# Patient Record
Sex: FEMALE | Race: WHITE | NOT HISPANIC OR LATINO | Employment: STUDENT | URBAN - METROPOLITAN AREA
[De-identification: names, ages, dates, MRNs, and addresses within clinical notes are randomized per-mention and may not be internally consistent; named-entity substitution may affect disease eponyms.]

---

## 2017-01-28 ENCOUNTER — OFFICE VISIT (OUTPATIENT)
Dept: URGENT CARE | Facility: CLINIC | Age: 32
End: 2017-01-28
Payer: COMMERCIAL

## 2017-01-28 VITALS
OXYGEN SATURATION: 99 % | RESPIRATION RATE: 18 BRPM | SYSTOLIC BLOOD PRESSURE: 100 MMHG | DIASTOLIC BLOOD PRESSURE: 68 MMHG | HEART RATE: 78 BPM | TEMPERATURE: 98.2 F

## 2017-01-28 DIAGNOSIS — R05.9 COUGH: ICD-10-CM

## 2017-01-28 DIAGNOSIS — J40 BRONCHITIS: ICD-10-CM

## 2017-01-28 PROCEDURE — 99214 OFFICE O/P EST MOD 30 MIN: CPT | Performed by: NURSE PRACTITIONER

## 2017-01-28 RX ORDER — ALBUTEROL SULFATE 90 UG/1
2 AEROSOL, METERED RESPIRATORY (INHALATION) EVERY 6 HOURS PRN
Qty: 8.5 G | Refills: 1 | Status: SHIPPED | OUTPATIENT
Start: 2017-01-28 | End: 2020-06-22 | Stop reason: SDUPTHER

## 2017-01-28 RX ORDER — AZITHROMYCIN 250 MG/1
TABLET, FILM COATED ORAL
Qty: 6 TAB | Refills: 0 | Status: SHIPPED | OUTPATIENT
Start: 2017-01-28 | End: 2018-11-27

## 2017-01-28 ASSESSMENT — ENCOUNTER SYMPTOMS
SORE THROAT: 1
GASTROINTESTINAL NEGATIVE: 1
SPUTUM PRODUCTION: 1
MYALGIAS: 1
COUGH: 1
WHEEZING: 1
CHILLS: 1
SHORTNESS OF BREATH: 1

## 2017-01-28 NOTE — MR AVS SNAPSHOT
Juanita Ramires   2017 10:45 AM   Office Visit   MRN: [id number]    Department:  [facility]   Dept Phone:  [phone]    Description:  Female : 1985   Provider:  Cathey J Hamman, A.P.N.           Reason for Visit     Cough Over a week dry cough , hoarseness and sorethroat      Allergies as of 2017     No Known Allergies      You were diagnosed with     Bronchitis   [122661]       Cough   [786.2.ICD-9-CM]         Vital Signs     Blood Pressure Pulse Temperature Respirations Oxygen Saturation Smoking Status    100/68 mmHg 78 36.8 °C (98.2 °F) 18 99% Never Smoker       Basic Information     Date Of Birth Sex Race Ethnicity Preferred Language    1985 Female White Non- English      Problem List              ICD-10-CM Priority Class Noted - Resolved    Encounter to establish care with new doctor Z71.89   2016 - Present    Encounter for initial prescription of contraceptive pills Z30.011   2016 - Present    Attention deficit hyperactivity disorder F90.9   2016 - Present    Acquired hypothyroidism E03.9   2016 - Present      Health Maintenance        Date Due Completion Dates    PAP SMEAR 2004 ---    IMM INFLUENZA (1) 2016 ---    IMM DTaP/Tdap/Td Vaccine (2 - Td) 2024            Current Immunizations     Tdap Vaccine 2014      Below and/or attached are the medications your provider expects you to take. Review all of your home medications and newly ordered medications with your provider and/or pharmacist. Follow medication instructions as directed by your provider and/or pharmacist. Please keep your medication list with you and share with your provider. Update the information when medications are discontinued, doses are changed, or new medications (including over-the-counter products) are added; and carry medication information at all times in the event of emergency situations     Allergies:  No Known Allergies          Medications   Valid as of: January 28, 2017 - 11:15 AM    Generic Name Brand Name Tablet Size Instructions for use    Albuterol Sulfate (Aero Soln) albuterol 108 (90 BASE) MCG/ACT Inhale 2 Puffs by mouth every 6 hours as needed for Shortness of Breath.        Albuterol Sulfate (Aero Soln) albuterol 108 (90 BASE) MCG/ACT Inhale 2 Puffs by mouth every 6 hours as needed for Shortness of Breath.        Azithromycin (Tab) ZITHROMAX 250 MG Take 2 pills by mouth on day one, take 1 pill by mouth on days 2-5        Desogestrel-Ethinyl Estradiol (Tab) KARIVA 0.15-0.02/0.01 MG (21/5) Take 1 Tab by mouth every day.        Levothyroxine Sodium (Tab) SYNTHROID 25 MCG Take 1 Tab by mouth every day.        Liothyronine Sodium (Tab) CYTOMEL 5 MCG Take 5 mcg by mouth every day.        Lisdexamfetamine Dimesylate (Cap) VYVANSE 30 MG Take 1 Cap by mouth every morning.        Lisdexamfetamine Dimesylate (Cap) VYVANSE 30 MG Take 1 Cap by mouth every morning.        Montelukast Sodium (Tab) SINGULAIR 10 MG Take 1 Tab by mouth every day.        ROPINIRole HCl (Tab) REQUIP 1 MG Take 1 Tab by mouth 3 times a day.        .                 Medicines prescribed today were sent to:     [facility][id number] [place], [place] - [address] [address]    Phone: [phone] Fax: [phone]    Open 24 Hours?: No      Medication refill instructions:       If your prescription bottle indicates you have medication refills left, it is not necessary to call your provider’s office. Please contact your pharmacy and they will refill your medication.    If your prescription bottle indicates you do not have any refills left, you may request refills at any time through one of the following ways: The online [employer] system (except Urgent Care), by calling your provider’s office, or by asking your pharmacy to contact your provider’s office with a refill request. Medication refills are processed only during regular business hours and  may not be available until the next business day. Your provider may request additional information or to have a follow-up visit with you prior to refilling your medication.   *Please Note: Medication refills are assigned a new Rx number when refilled electronically. Your pharmacy may indicate that no refills were authorized even though a new prescription for the same medication is available at the pharmacy. Please request the medicine by name with the pharmacy before contacting your provider for a refill.           CBLPath Access Code: [email or web address]  Expires: 1/29/2017  8:21 AM    CBLPath  A secure, online tool to manage your health information     [employer]’s CBLPath® is a secure, online tool that connects you to your personalized health information from the privacy of your home -- day or night - making it very easy for you to manage your healthcare. Once the activation process is completed, you can even access your medical information using the CBLPath richard, which is available for free in the Apple Richard store or Google Play store.     CBLPath provides the following levels of access (as shown below):   My Chart Features   [facility] Doctor [facility]  Specialists [facility]  Urgent  Care Non-[facility]  Primary Care  Doctor   Email your healthcare team securely and privately 24/7 X X X    Manage appointments: schedule your next appointment; view details of past/upcoming appointments X      Request prescription refills. X      View recent personal medical records, including lab and immunizations X X X X   View health record, including health history, allergies, medications X X X X   Read reports about your outpatient visits, procedures, consult and ER notes X X X X   See your discharge summary, which is a recap of your hospital and/or ER visit that includes your diagnosis, lab results, and care plan. X X       How to register for CBLPath:  1. Go to  [email or web address].  2. Click on the Sign Up Now box,  which takes you to the New Member Sign Up page. You will need to provide the following information:  a. Enter your [employer] Access Code exactly as it appears at the top of this page. (You will not need to use this code after you’ve completed the sign-up process. If you do not sign up before the expiration date, you must request a new code.)   b. Enter your date of birth.   c. Enter your home email address.   d. Click Submit, and follow the next screen’s instructions.  3. Create a [employer] ID. This will be your [employer] login ID and cannot be changed, so think of one that is secure and easy to remember.  4. Create a [employer] password. You can change your password at any time.  5. Enter your Password Reset Question and Answer. This can be used at a later time if you forget your password.   6. Enter your e-mail address. This allows you to receive e-mail notifications when new information is available in [employer].  7. Click Sign Up. You can now view your health information.    For assistance activating your [employer] account, call [phone]

## 2017-01-28 NOTE — PROGRESS NOTES
Subjective:      Juanita Ramires is a 31 y.o. female who presents with Cough    Past Medical History   Diagnosis Date   • Restless leg syndrome    • Hashimoto's disease    • Hyperthyroidism      Social History     Social History   • Marital Status:      Spouse Name: N/A   • Number of Children: N/A   • Years of Education: N/A     Occupational History   • Not on file.     Social History Main Topics   • Smoking status: Never Smoker    • Smokeless tobacco: Not on file   • Alcohol Use: 0.6 oz/week     1 Shots of liquor per week      Comment: 1 x per year   • Drug Use: No   • Sexual Activity: Yes     Other Topics Concern   • Not on file     Social History Narrative     No family history on file.          Cough  This is a new problem. The current episode started 1 to 4 weeks ago. The problem has been gradually worsening. The problem occurs every few minutes. The cough is productive of sputum. Associated symptoms include chills, ear congestion, ear pain, myalgias, nasal congestion, postnasal drip, a sore throat, shortness of breath and wheezing. Nothing aggravates the symptoms. She has tried OTC cough suppressant for the symptoms. The treatment provided mild relief.       Review of Systems   Constitutional: Positive for chills and malaise/fatigue.   HENT: Positive for congestion, ear pain, postnasal drip and sore throat.    Respiratory: Positive for cough, sputum production, shortness of breath and wheezing.    Gastrointestinal: Negative.    Genitourinary: Negative.    Musculoskeletal: Positive for myalgias.   Skin: Negative.        All other systems reviewed and are negative      Objective:     /68 mmHg  Pulse 78  Temp(Src) 36.8 °C (98.2 °F)  Resp 18  SpO2 99%     Physical Exam   Constitutional: She is oriented to person, place, and time. She appears well-developed and well-nourished.   HENT:   Right Ear: External ear normal.   Left Ear: External ear normal.   Mouth/Throat: No oropharyngeal exudate.   Eyes:  EOM are normal. Pupils are equal, round, and reactive to light.   Neck: Normal range of motion. Neck supple.   Cardiovascular: Normal rate and regular rhythm.    Pulmonary/Chest: She has wheezes.   Breath sounds harsh   Scattered    Musculoskeletal: Normal range of motion.   Neurological: She is alert and oriented to person, place, and time.   Skin: Skin is warm and dry.   Psychiatric: She has a normal mood and affect. Her behavior is normal.   Vitals reviewed.              Assessment/Plan:   Bronchitis  Cough   -zithromax   -albuterol   -declined RX for cough   -follow up if symtpoms persist or worsen   There are no diagnoses linked to this encounter.

## 2018-11-27 ENCOUNTER — HOSPITAL ENCOUNTER (OUTPATIENT)
Facility: MEDICAL CENTER | Age: 33
End: 2018-11-27
Attending: FAMILY MEDICINE
Payer: COMMERCIAL

## 2018-11-27 ENCOUNTER — OFFICE VISIT (OUTPATIENT)
Dept: URGENT CARE | Facility: CLINIC | Age: 33
End: 2018-11-27
Payer: COMMERCIAL

## 2018-11-27 VITALS
TEMPERATURE: 98.9 F | RESPIRATION RATE: 16 BRPM | DIASTOLIC BLOOD PRESSURE: 78 MMHG | WEIGHT: 160 LBS | BODY MASS INDEX: 22.9 KG/M2 | HEIGHT: 70 IN | SYSTOLIC BLOOD PRESSURE: 108 MMHG | HEART RATE: 60 BPM | OXYGEN SATURATION: 97 %

## 2018-11-27 DIAGNOSIS — R30.0 DYSURIA: ICD-10-CM

## 2018-11-27 LAB
APPEARANCE UR: CLEAR
BILIRUB UR STRIP-MCNC: ABNORMAL MG/DL
COLOR UR AUTO: ABNORMAL
GLUCOSE UR STRIP.AUTO-MCNC: ABNORMAL MG/DL
KETONES UR STRIP.AUTO-MCNC: ABNORMAL MG/DL
LEUKOCYTE ESTERASE UR QL STRIP.AUTO: ABNORMAL
NITRITE UR QL STRIP.AUTO: ABNORMAL
PH UR STRIP.AUTO: 7.5 [PH] (ref 5–8)
PROT UR QL STRIP: 30 MG/DL
RBC UR QL AUTO: ABNORMAL
SP GR UR STRIP.AUTO: 1.02
UROBILINOGEN UR STRIP-MCNC: 0.2 MG/DL

## 2018-11-27 PROCEDURE — 81002 URINALYSIS NONAUTO W/O SCOPE: CPT | Performed by: FAMILY MEDICINE

## 2018-11-27 PROCEDURE — 99000 SPECIMEN HANDLING OFFICE-LAB: CPT | Performed by: FAMILY MEDICINE

## 2018-11-27 PROCEDURE — 87086 URINE CULTURE/COLONY COUNT: CPT

## 2018-11-27 PROCEDURE — 87186 SC STD MICRODIL/AGAR DIL: CPT

## 2018-11-27 PROCEDURE — 87077 CULTURE AEROBIC IDENTIFY: CPT

## 2018-11-27 PROCEDURE — 99214 OFFICE O/P EST MOD 30 MIN: CPT | Performed by: FAMILY MEDICINE

## 2018-11-27 RX ORDER — PHENAZOPYRIDINE HYDROCHLORIDE 200 MG/1
200 TABLET, FILM COATED ORAL 3 TIMES DAILY
Qty: 6 TAB | Refills: 0 | Status: SHIPPED | OUTPATIENT
Start: 2018-11-27 | End: 2018-11-29

## 2018-11-27 RX ORDER — NITROFURANTOIN 25; 75 MG/1; MG/1
100 CAPSULE ORAL EVERY 12 HOURS
Qty: 10 CAP | Refills: 0 | Status: SHIPPED | OUTPATIENT
Start: 2018-11-27 | End: 2018-11-30

## 2018-11-27 ASSESSMENT — ENCOUNTER SYMPTOMS
DIZZINESS: 0
VOMITING: 0
NAUSEA: 0
CHILLS: 0
FOCAL WEAKNESS: 0
FEVER: 0

## 2018-11-28 NOTE — PROGRESS NOTES
Subjective:      Juanita Fong is a 33 y.o. female who presents with Dysuria      - This is a very pleasant, well and non-toxic appearing 33 y.o. female with complaints of urinary freq/burn and blood in urine x 3 days. No NVFC          ALLERGIES:  Patient has no known allergies.     PMH:  Past Medical History:   Diagnosis Date   • Hashimoto's disease    • Hyperthyroidism    • Restless leg syndrome         MEDS:    Current Outpatient Prescriptions:   •  nitrofurantoin monohydr macro (MACROBID) 100 MG Cap, Take 1 Cap by mouth every 12 hours for 5 days., Disp: 10 Cap, Rfl: 0  •  phenazopyridine (PYRIDIUM) 200 MG Tab, Take 1 Tab by mouth 3 times a day for 2 days., Disp: 6 Tab, Rfl: 0  •  albuterol 108 (90 BASE) MCG/ACT Aero Soln inhalation aerosol, Inhale 2 Puffs by mouth every 6 hours as needed for Shortness of Breath., Disp: 8.5 g, Rfl: 1  •  levothyroxine (SYNTHROID) 25 MCG Tab, Take 1 Tab by mouth every day., Disp: 90 Tab, Rfl: 3  •  lisdexamfetamine (VYVANSE) 30 MG capsule, Take 1 Cap by mouth every morning., Disp: 30 Cap, Rfl: 0  •  ropinirole (REQUIP) 1 MG Tab, Take 1 Tab by mouth 3 times a day., Disp: 90 Tab, Rfl: 3  •  liothyronine (CYTOMEL) 5 MCG Tab, Take 5 mcg by mouth every day., Disp: , Rfl:   •  desogestrel-ethinyl estradiol (KARIVA) 0.15-0.02/0.01 MG (21/5) per tablet, Take 1 Tab by mouth every day., Disp: 28 Tab, Rfl: 11  •  montelukast (SINGULAIR) 10 MG Tab, Take 1 Tab by mouth every day., Disp: 30 Tab, Rfl: 2    ** I have documented what I find to be significant in regards to past medical, social, family and surgical history  in my HPI or under PMH/PSH/FH review section, otherwise it is contributory **           HPI    Review of Systems   Constitutional: Negative for chills and fever.   Gastrointestinal: Negative for nausea and vomiting.   Genitourinary: Positive for dysuria, frequency and hematuria.   Neurological: Negative for dizziness and focal weakness.   All other systems reviewed and are  "negative.         Objective:     /78   Pulse 60   Temp 37.2 °C (98.9 °F) (Temporal)   Resp 16   Ht 1.778 m (5' 10\")   Wt 72.6 kg (160 lb)   SpO2 97%   BMI 22.96 kg/m²      Physical Exam   Constitutional: She appears well-developed. No distress.   HENT:   Head: Normocephalic and atraumatic.   Cardiovascular: Regular rhythm.    No murmur heard.  Pulmonary/Chest: Effort normal. No respiratory distress.   Abdominal: Soft. There is no tenderness.   Neurological: She is alert. She exhibits normal muscle tone.   Skin: Skin is warm and dry.   Psychiatric: She has a normal mood and affect. Judgment normal.   Nursing note and vitals reviewed.              Assessment/Plan:         1. Dysuria  POCT Urinalysis    Urine Culture    nitrofurantoin monohydr macro (MACROBID) 100 MG Cap    phenazopyridine (PYRIDIUM) 200 MG Tab             Dx & d/c instructions discussed w/ patient and/or family members.     ER precautions (worsening signs symptoms and when to go to ER) discussed.    Follow up w/ PCP in 2-3 days to make sure symptoms improving and no further intervention/treatment and/or work-up needed was advised, ER if feeling worse or not improving in 2 days.    Possible side effects (i.e. Rash, GI upset/constipation, sedation, elevation of BP or sugars) of any medications given discussed.     Patient left in stable condition            "

## 2018-11-30 ENCOUNTER — TELEPHONE (OUTPATIENT)
Dept: URGENT CARE | Facility: PHYSICIAN GROUP | Age: 33
End: 2018-11-30

## 2018-11-30 LAB
BACTERIA UR CULT: ABNORMAL
BACTERIA UR CULT: ABNORMAL
SIGNIFICANT IND 70042: ABNORMAL
SITE SITE: ABNORMAL
SOURCE SOURCE: ABNORMAL

## 2018-11-30 RX ORDER — FLUCONAZOLE 150 MG/1
150 TABLET ORAL DAILY
Qty: 1 TAB | Refills: 2 | Status: SHIPPED | OUTPATIENT
Start: 2018-11-30 | End: 2019-12-05

## 2018-11-30 RX ORDER — SULFAMETHOXAZOLE AND TRIMETHOPRIM 800; 160 MG/1; MG/1
1 TABLET ORAL EVERY 12 HOURS
Qty: 8 TAB | Refills: 0 | Status: SHIPPED | OUTPATIENT
Start: 2018-11-30 | End: 2018-12-04

## 2019-10-13 ENCOUNTER — HOSPITAL ENCOUNTER (OUTPATIENT)
Facility: MEDICAL CENTER | Age: 34
End: 2019-10-13
Attending: NURSE PRACTITIONER
Payer: COMMERCIAL

## 2019-10-13 ENCOUNTER — OFFICE VISIT (OUTPATIENT)
Dept: URGENT CARE | Facility: CLINIC | Age: 34
End: 2019-10-13
Payer: COMMERCIAL

## 2019-10-13 VITALS
RESPIRATION RATE: 16 BRPM | OXYGEN SATURATION: 99 % | HEART RATE: 58 BPM | HEIGHT: 70 IN | WEIGHT: 152 LBS | TEMPERATURE: 99 F | BODY MASS INDEX: 21.76 KG/M2 | DIASTOLIC BLOOD PRESSURE: 60 MMHG | SYSTOLIC BLOOD PRESSURE: 92 MMHG

## 2019-10-13 DIAGNOSIS — N30.90 CYSTITIS: ICD-10-CM

## 2019-10-13 LAB
APPEARANCE UR: ABNORMAL
BILIRUB UR STRIP-MCNC: ABNORMAL MG/DL
COLOR UR AUTO: ABNORMAL
GLUCOSE UR STRIP.AUTO-MCNC: ABNORMAL MG/DL
KETONES UR STRIP.AUTO-MCNC: ABNORMAL MG/DL
LEUKOCYTE ESTERASE UR QL STRIP.AUTO: ABNORMAL
NITRITE UR QL STRIP.AUTO: ABNORMAL
PH UR STRIP.AUTO: 8.5 [PH] (ref 5–8)
PROT UR QL STRIP: ABNORMAL MG/DL
RBC UR QL AUTO: ABNORMAL
SP GR UR STRIP.AUTO: 1.01
UROBILINOGEN UR STRIP-MCNC: 0.2 MG/DL

## 2019-10-13 PROCEDURE — 99000 SPECIMEN HANDLING OFFICE-LAB: CPT | Performed by: NURSE PRACTITIONER

## 2019-10-13 PROCEDURE — 99214 OFFICE O/P EST MOD 30 MIN: CPT | Performed by: NURSE PRACTITIONER

## 2019-10-13 PROCEDURE — 81002 URINALYSIS NONAUTO W/O SCOPE: CPT | Performed by: NURSE PRACTITIONER

## 2019-10-13 PROCEDURE — 87086 URINE CULTURE/COLONY COUNT: CPT

## 2019-10-13 PROCEDURE — 87077 CULTURE AEROBIC IDENTIFY: CPT

## 2019-10-13 PROCEDURE — 87186 SC STD MICRODIL/AGAR DIL: CPT

## 2019-10-13 RX ORDER — NITROFURANTOIN 25; 75 MG/1; MG/1
100 CAPSULE ORAL 2 TIMES DAILY
Qty: 10 CAP | Refills: 0 | Status: SHIPPED | OUTPATIENT
Start: 2019-10-13 | End: 2019-10-18

## 2019-10-13 RX ORDER — PHENAZOPYRIDINE HYDROCHLORIDE 200 MG/1
200 TABLET, FILM COATED ORAL 3 TIMES DAILY PRN
Qty: 10 TAB | Refills: 0 | Status: SHIPPED | OUTPATIENT
Start: 2019-10-13 | End: 2019-12-05

## 2019-10-13 RX ORDER — FLUCONAZOLE 150 MG/1
150 TABLET ORAL DAILY
Qty: 1 TAB | Refills: 0 | Status: SHIPPED | OUTPATIENT
Start: 2019-10-13 | End: 2019-10-14

## 2019-10-13 ASSESSMENT — ENCOUNTER SYMPTOMS
FLANK PAIN: 0
FEVER: 0
CHILLS: 0

## 2019-10-13 NOTE — PROGRESS NOTES
Subjective:      Juanita Fong is a 34 y.o. female who presents with Dysuria    Past Medical History:   Diagnosis Date   • Hashimoto's disease    • Hyperthyroidism    • Restless leg syndrome        Social History     Socioeconomic History   • Marital status:      Spouse name: Not on file   • Number of children: Not on file   • Years of education: Not on file   • Highest education level: Not on file   Occupational History   • Not on file   Social Needs   • Financial resource strain: Not on file   • Food insecurity:     Worry: Not on file     Inability: Not on file   • Transportation needs:     Medical: Not on file     Non-medical: Not on file   Tobacco Use   • Smoking status: Never Smoker   • Smokeless tobacco: Never Used   Substance and Sexual Activity   • Alcohol use: Yes     Alcohol/week: 0.6 oz     Types: 1 Shots of liquor per week     Comment: 1 x per year   • Drug use: No   • Sexual activity: Yes   Lifestyle   • Physical activity:     Days per week: Not on file     Minutes per session: Not on file   • Stress: Not on file   Relationships   • Social connections:     Talks on phone: Not on file     Gets together: Not on file     Attends Zoroastrianism service: Not on file     Active member of club or organization: Not on file     Attends meetings of clubs or organizations: Not on file     Relationship status: Not on file   • Intimate partner violence:     Fear of current or ex partner: Not on file     Emotionally abused: Not on file     Physically abused: Not on file     Forced sexual activity: Not on file   Other Topics Concern   • Not on file   Social History Narrative   • Not on file     History reviewed. No pertinent family history.    Allergies: Patient has no known allergies.    Patient is a 34-year-old female who presents today with complaint of dysuria, urgency, and frequency.  Symptoms started yesterday.  States today she started to note blood in her urine.        Dysuria    This is a new problem. The  "current episode started in the past 7 days. The problem occurs every urination. The problem has been unchanged. The quality of the pain is described as aching and burning. Associated symptoms include frequency, hematuria and urgency. Pertinent negatives include no chills or flank pain. She has tried nothing for the symptoms. The treatment provided no relief.       Review of Systems   Constitutional: Positive for malaise/fatigue. Negative for chills and fever.   Genitourinary: Positive for dysuria, frequency, hematuria and urgency. Negative for flank pain.   Skin: Negative.    All other systems reviewed and are negative.         Objective:     BP (!) 92/60   Pulse (!) 58   Temp 37.2 °C (99 °F) (Oral)   Resp 16   Ht 1.778 m (5' 10\")   Wt 68.9 kg (152 lb)   SpO2 99%   BMI 21.81 kg/m²      Physical Exam   Constitutional: She is oriented to person, place, and time. She appears well-developed and well-nourished.   Cardiovascular: Normal rate, regular rhythm and normal heart sounds.   Pulmonary/Chest: Effort normal and breath sounds normal.   Abdominal: Soft. She exhibits no distension. There is tenderness. There is no rebound and no guarding.   Positive suprapubic tenderness, no CVA tenderness   Musculoskeletal: Normal range of motion.   Neurological: She is alert and oriented to person, place, and time.   Skin: Skin is warm and dry.   Psychiatric: She has a normal mood and affect. Her behavior is normal. Judgment and thought content normal.   Vitals reviewed.              Assessment/Plan:     1. Cystitis    - nitrofurantoin monohyd macro (MACROBID) 100 MG Cap; Take 1 Cap by mouth 2 times a day for 5 days.  Dispense: 10 Cap; Refill: 0  - phenazopyridine (PYRIDIUM) 200 MG Tab; Take 1 Tab by mouth 3 times a day as needed.  Dispense: 10 Tab; Refill: 0  - fluconazole (DIFLUCAN) 150 MG tablet; Take 1 Tab by mouth every day for 1 day.  Dispense: 1 Tab; Refill: 0  - URINE CULTURE(NEW); Future  -push fluids  -ER " precautions for flank pain, fever >101, n/v, flu like symptoms.

## 2019-10-22 ENCOUNTER — OFFICE VISIT (OUTPATIENT)
Dept: URGENT CARE | Facility: CLINIC | Age: 34
End: 2019-10-22
Payer: COMMERCIAL

## 2019-10-22 ENCOUNTER — TELEPHONE (OUTPATIENT)
Dept: SCHEDULING | Facility: IMAGING CENTER | Age: 34
End: 2019-10-22

## 2019-10-22 ENCOUNTER — HOSPITAL ENCOUNTER (OUTPATIENT)
Facility: MEDICAL CENTER | Age: 34
End: 2019-10-22
Attending: PHYSICIAN ASSISTANT
Payer: COMMERCIAL

## 2019-10-22 VITALS
SYSTOLIC BLOOD PRESSURE: 98 MMHG | HEART RATE: 66 BPM | OXYGEN SATURATION: 100 % | WEIGHT: 152 LBS | BODY MASS INDEX: 21.81 KG/M2 | TEMPERATURE: 98.4 F | DIASTOLIC BLOOD PRESSURE: 62 MMHG | RESPIRATION RATE: 16 BRPM

## 2019-10-22 DIAGNOSIS — R30.0 DYSURIA: ICD-10-CM

## 2019-10-22 LAB
APPEARANCE UR: CLEAR
BILIRUB UR STRIP-MCNC: NORMAL MG/DL
CANDIDA DNA VAG QL PROBE+SIG AMP: NEGATIVE
COLOR UR AUTO: YELLOW
G VAGINALIS DNA VAG QL PROBE+SIG AMP: NEGATIVE
GLUCOSE UR STRIP.AUTO-MCNC: NORMAL MG/DL
INT CON NEG: NORMAL
INT CON POS: NORMAL
KETONES UR STRIP.AUTO-MCNC: NORMAL MG/DL
LEUKOCYTE ESTERASE UR QL STRIP.AUTO: NORMAL
NITRITE UR QL STRIP.AUTO: NORMAL
PH UR STRIP.AUTO: 7.5 [PH] (ref 5–8)
POC URINE PREGNANCY TEST: NORMAL
PROT UR QL STRIP: NORMAL MG/DL
RBC UR QL AUTO: NORMAL
SP GR UR STRIP.AUTO: 1.01
T VAGINALIS DNA VAG QL PROBE+SIG AMP: NEGATIVE
UROBILINOGEN UR STRIP-MCNC: 0.2 MG/DL

## 2019-10-22 PROCEDURE — 81025 URINE PREGNANCY TEST: CPT | Performed by: PHYSICIAN ASSISTANT

## 2019-10-22 PROCEDURE — 81002 URINALYSIS NONAUTO W/O SCOPE: CPT | Performed by: PHYSICIAN ASSISTANT

## 2019-10-22 PROCEDURE — 87660 TRICHOMONAS VAGIN DIR PROBE: CPT

## 2019-10-22 PROCEDURE — 87480 CANDIDA DNA DIR PROBE: CPT

## 2019-10-22 PROCEDURE — 87510 GARDNER VAG DNA DIR PROBE: CPT

## 2019-10-22 PROCEDURE — 99213 OFFICE O/P EST LOW 20 MIN: CPT | Performed by: PHYSICIAN ASSISTANT

## 2019-10-22 ASSESSMENT — ENCOUNTER SYMPTOMS
DIZZINESS: 0
SHORTNESS OF BREATH: 0
PALPITATIONS: 0
FEVER: 0
CHILLS: 0
ABDOMINAL PAIN: 1
FLANK PAIN: 0
BLURRED VISION: 0

## 2019-10-22 NOTE — PROGRESS NOTES
Subjective:      Juanita Montero is a 34 y.o. female who presents with Dysuria      HPI:  Patient was seen on 10/13/2019 for similar symptoms. At that time she was found to have a UTI and was placed on Macrobid, which the E. Coli was sensitive to according to the urine culture. She says she took the full course of abx but over the past few days symptoms have been returning again and it feels like it hurts to go to the bathroom. No fever/chills or N/V. She did note some mild white vaginal discharge earlier today.      Review of Systems   Constitutional: Negative for chills, fever and malaise/fatigue.   Eyes: Negative for blurred vision.   Respiratory: Negative for shortness of breath.    Cardiovascular: Negative for chest pain and palpitations.   Gastrointestinal: Positive for abdominal pain.   Genitourinary: Positive for dysuria, frequency and urgency. Negative for flank pain and hematuria.   Neurological: Negative for dizziness.          PMH:  has a past medical history of Hashimoto's disease, Hyperthyroidism, and Restless leg syndrome.  MEDS:   Current Outpatient Medications:   •  phenazopyridine (PYRIDIUM) 200 MG Tab, Take 1 Tab by mouth 3 times a day as needed., Disp: 10 Tab, Rfl: 0  •  fluconazole (DIFLUCAN) 150 MG tablet, Take 1 Tab by mouth every day. (Patient not taking: Reported on 10/13/2019), Disp: 1 Tab, Rfl: 2  •  albuterol 108 (90 BASE) MCG/ACT Aero Soln inhalation aerosol, Inhale 2 Puffs by mouth every 6 hours as needed for Shortness of Breath., Disp: 8.5 g, Rfl: 1  •  levothyroxine (SYNTHROID) 25 MCG Tab, Take 1 Tab by mouth every day., Disp: 90 Tab, Rfl: 3  •  lisdexamfetamine (VYVANSE) 30 MG capsule, Take 1 Cap by mouth every morning., Disp: 30 Cap, Rfl: 0  •  ropinirole (REQUIP) 1 MG Tab, Take 1 Tab by mouth 3 times a day. (Patient not taking: Reported on 10/13/2019), Disp: 90 Tab, Rfl: 3  •  liothyronine (CYTOMEL) 5 MCG Tab, Take 5 mcg by mouth every day., Disp: , Rfl:   •   desogestrel-ethinyl estradiol (KARIVA) 0.15-0.02/0.01 MG (21/5) per tablet, Take 1 Tab by mouth every day. (Patient not taking: Reported on 10/13/2019), Disp: 28 Tab, Rfl: 11  •  montelukast (SINGULAIR) 10 MG Tab, Take 1 Tab by mouth every day. (Patient not taking: Reported on 10/13/2019), Disp: 30 Tab, Rfl: 2  ALLERGIES: No Known Allergies  SURGHX: History reviewed. No pertinent surgical history.  SOCHX:  reports that she has never smoked. She has never used smokeless tobacco. She reports that she drinks about 0.6 oz of alcohol per week. She reports that she does not use drugs.  FH: Family history was reviewed, no pertinent findings to report         Objective:     BP (!) 98/62   Pulse 66   Temp 36.9 °C (98.4 °F) (Temporal)   Resp 16   Wt 68.9 kg (152 lb)   LMP  (LMP Unknown) Comment: IUD  SpO2 100%   BMI 21.81 kg/m²      Physical Exam   Constitutional: She is oriented to person, place, and time. She appears well-developed and well-nourished.   HENT:   Head: Normocephalic and atraumatic.   Right Ear: External ear normal.   Left Ear: External ear normal.   Eyes: Pupils are equal, round, and reactive to light. Conjunctivae are normal.   Cardiovascular: Normal rate and regular rhythm.   No murmur heard.  Pulmonary/Chest: Effort normal and breath sounds normal.   Abdominal: Soft. Normal appearance. There is no tenderness. There is no CVA tenderness.   Neurological: She is alert and oriented to person, place, and time.   Skin: Skin is warm and dry. Capillary refill takes less than 2 seconds.   Psychiatric: She has a normal mood and affect. Her behavior is normal. Judgment normal.         POCT Urinalysis - Negative for infection    POCT Pregnancy - Negative     Assessment/Plan:     1. Dysuria  - VAGINAL PATHOGENS DNA PANEL; Future  - POCT Urinalysis  - POCT Pregnancy  *UA is negative for infection. Will rule out other causes of her symptoms (yeast, BV) and if those are negative we can consider a short course of  Bactrim based on previous culture results          Differential Diagnosis, natural history, and supportive care discussed. Return to the Urgent Care or follow up with your PCP if symptoms fail to resolve, or for any new or worsening symptoms. Emergency room precautions discussed. Patient and/or family appears understanding of information.

## 2019-12-05 ENCOUNTER — OFFICE VISIT (OUTPATIENT)
Dept: MEDICAL GROUP | Facility: LAB | Age: 34
End: 2019-12-05
Payer: COMMERCIAL

## 2019-12-05 VITALS
RESPIRATION RATE: 16 BRPM | BODY MASS INDEX: 22.65 KG/M2 | HEIGHT: 70 IN | DIASTOLIC BLOOD PRESSURE: 58 MMHG | TEMPERATURE: 98.7 F | WEIGHT: 158.2 LBS | HEART RATE: 60 BPM | OXYGEN SATURATION: 95 % | SYSTOLIC BLOOD PRESSURE: 98 MMHG

## 2019-12-05 DIAGNOSIS — F33.9 RECURRENT MAJOR DEPRESSIVE DISORDER, REMISSION STATUS UNSPECIFIED (HCC): ICD-10-CM

## 2019-12-05 DIAGNOSIS — F41.9 ANXIETY: ICD-10-CM

## 2019-12-05 DIAGNOSIS — Z00.00 WELL ADULT EXAM: ICD-10-CM

## 2019-12-05 DIAGNOSIS — E03.9 ACQUIRED HYPOTHYROIDISM: ICD-10-CM

## 2019-12-05 PROCEDURE — 99214 OFFICE O/P EST MOD 30 MIN: CPT | Performed by: NURSE PRACTITIONER

## 2019-12-05 RX ORDER — LEVOTHYROXINE SODIUM 0.03 MG/1
25 TABLET ORAL
COMMUNITY
End: 2020-02-10 | Stop reason: SDUPTHER

## 2019-12-05 RX ORDER — LIOTHYRONINE SODIUM 5 UG/1
5 TABLET ORAL DAILY
Qty: 90 TAB | Refills: 3 | Status: SHIPPED | OUTPATIENT
Start: 2019-12-05 | End: 2020-02-12 | Stop reason: SDUPTHER

## 2019-12-05 RX ORDER — LEVOTHYROXINE SODIUM 0.2 MG/1
200 TABLET ORAL
COMMUNITY
End: 2020-02-12 | Stop reason: SDUPTHER

## 2019-12-05 RX ORDER — SERTRALINE HYDROCHLORIDE 25 MG/1
25 TABLET, FILM COATED ORAL DAILY
Qty: 30 TAB | Refills: 11 | Status: SHIPPED
Start: 2019-12-05 | End: 2020-06-22

## 2019-12-05 ASSESSMENT — PATIENT HEALTH QUESTIONNAIRE - PHQ9: CLINICAL INTERPRETATION OF PHQ2 SCORE: 0

## 2019-12-05 NOTE — PROGRESS NOTES
CC:Diagnoses of Well adult exam, Acquired hypothyroidism, Recurrent major depressive disorder, remission status unspecified (HCC), and Anxiety were pertinent to this visit.    HISTORY OF PRESENT ILLNESS: Juanita Montero is a 34 y.o. female  patient who is here to establish care.  She is a previous patient of [place] and is here today and to discuss the following problems:    Depression  Anxiety  New to me, chronic problem for which patient has been on and off Zoloft since she was a teenager.  She does believe that she mostly suffers from depression but has some anxiety mixed in.  She was recently started on 50 mg of Zoloft however she has been alternating between 25 and 50 and feels better at 25. Current symptoms include none. Patient denies hopelessness, impaired memory, recurrent thoughts of death, suicidal thoughts without plan, suicidal thoughts with specific plan and suicidal attempt. She denies current suicidal and homicidal plan or intent. Family history significant for nothing,. Possible organic causes contributing are: endocrine/metabolic. Risk factors: previous episode of depression Previous treatment includes individual therapy and cognitive therapy. She complains of the following side effects from the treatment: On higher doses of Zoloft she reports that she had increased suicidal thoughts however on lower doses she denies any symptoms.  She does feel her symptoms tend to be worse seasonally and situationally.    Depression Screening    Little interest or pleasure in doing things?  0 - not at all  Feeling down, depressed , or hopeless? 0 - not at all  Patient Health Questionnaire Score: 0    If depressive symptoms identified deferred to follow up visit unless specifically addressed in assesment and plan.      Interpretation of PHQ-9 Total Score   Score Severity   1-4 Minimal Depression   5-9 Mild Depression   10-14 Moderate Depression   15-19 Moderately Severe Depression   20-27 Severe  Depression        Hypothyroidism  New to me, chronic.  Patient has been on 225 mcg Synthroid daily as well as 5 g of Cytomel.  She has been followed by endocrinology in the past however had no records today and we will request those records.  She does state that she has Hashimoto's and has had elevated TPO antibodies.  Reports that she feels well and takes her medication on an empty stomach every morning.  ROS is NEGATIVE for: cold or heat intolerance, anxiety/depression, chest pain/pressure, palpitations, hair thickening/coarsening/falling out/thinning, skin changes, diarrhea/constipation, unexpected weight change.    Health Maintenance: Completed    Patient Active Problem List    Diagnosis Date Noted   • Anxiety 12/05/2019   • Acquired hypothyroidism 08/29/2016   • Encounter for initial prescription of contraceptive pills 07/13/2016   • Attention deficit hyperactivity disorder 07/13/2016        Allergies:Patient has no known allergies.    Current Outpatient Medications   Medication Sig Dispense Refill   • levothyroxine (SYNTHROID) 200 MCG Tab Take 200 mcg by mouth Every morning on an empty stomach.     • levothyroxine (SYNTHROID) 25 MCG Tab Take 25 mcg by mouth Every morning on an empty stomach.     • sertraline (ZOLOFT) 25 MG tablet Take 1 Tab by mouth every day. 30 Tab 11   • liothyronine (CYTOMEL) 5 MCG Tab Take 1 Tab by mouth every day. 90 Tab 3   • albuterol 108 (90 BASE) MCG/ACT Aero Soln inhalation aerosol Inhale 2 Puffs by mouth every 6 hours as needed for Shortness of Breath. 8.5 g 1     No current facility-administered medications for this visit.        Social History     Tobacco Use   • Smoking status: Never Smoker   • Smokeless tobacco: Never Used   Substance Use Topics   • Alcohol use: Yes     Alcohol/week: 0.6 oz     Types: 1 Shots of liquor per week     Comment: 1 x per year   • Drug use: No     Social History     Patient does not qualify to have social determinant information on file (likely too  "young).   Social History Narrative   • Not on file       Family History   Problem Relation Age of Onset   • Osteoporosis Mother    • No Known Problems Father        ROS:     Constitutional:  Negative for fever, chills, unexpected weight change, and fatigue/generalized weakness.  HEENT:  Negative for headaches, vision changes, hearing changes, ear pain, ear discharge, rhinorrhea, sinus congestion, sore throat, and neck pain.    Respiratory: Negative for cough, sputum production, chest congestion, dyspnea, wheezing, and crackles.    Cardiovascular:Negative for chest pain, palpitations, orthopnea, and bilateral lower extremity edema.   Gastrointestinal:Negative for heartburn, nausea, vomiting, abdominal pain, hematochezia, melena, diarrhea, constipation, and greasy/foul-smelling stools.   Genitourinary: Negative for dysuria, polyuria, hematuria, pyuria, urinary urgency, and urinary incontinence.   Musculoskeletal:Negative for myalgias, back pain, and joint pain.   Skin: Negative for rash, itching, cyanotic skin color change.   Neurological: Negative for dizziness, tingling, tremors, focal sensory deficit, focal weakness and headaches.   Endo/Heme/Allergies: Does not bruise/bleed easily.   Psychiatric/Behavioral: Negative for depression, suicidal/homicidal ideation and memory loss.        EXAM:   BP (!) 98/58 (BP Location: Left arm, Patient Position: Sitting, BP Cuff Size: Adult)   Pulse 60   Temp 37.1 °C (98.7 °F) (Temporal)   Resp 16   Ht 1.575 m (5' 2\")   Wt 71.8 kg (158 lb 3.2 oz)   SpO2 95%  Body mass index is 28.94 kg/m².    Constitutional: Well-developed and well-nourished. Not diaphoretic. No distress.   Skin: Skin is warm and dry. No rash noted.  Head: Atraumatic without lesions.  Eyes: Conjunctivae and extraocular motions are normal. Pupils are equal, round, and reactive to light. No scleral icterus.   Ears:  External ears unremarkable. Tympanic membranes clear and intact.  Nose: Nares patent. Mucosa " without edema or erythema. No discharge. No facial tenderness.  Mouth/Throat: Tongue normal. Oropharynx is clear and moist. Posterior pharynx without erythema or exudates.  Neck: Supple, trachea midline. No thyromegaly present. No cervical or supraclavicular lymphadenopathy.  Cardiovascular: Regular rate and rhythm.   Chest: Effort normal. Clear to auscultation throughout. No adventitious sounds.   Abdomen: Soft, non tender, and without distention. Active bowel sounds in all four quadrants. No rebound, guarding, masses or hepatosplenomegaly.  Extremities: No cyanosis, clubbing, erythema, nor edema.   Neurological: Alert and oriented x 3. Sensation intact.   Psychiatric:  Behavior, mood, and affect are appropriate.       ASSESSMENT/PLAN:    1. Well adult exam  Due for Pap. Had positive HPV on last one year ago.   - CBC WITH DIFFERENTIAL; Future  - Comp Metabolic Panel; Future  - VITAMIN D,25 HYDROXY; Future  - Lipid Profile; Future    2. Acquired hypothyroidism  New to me, chronic. Stable, well-controlled, taking medication as directed.     - TSH; Future  - FREE THYROXINE; Future  - liothyronine (CYTOMEL) 5 MCG Tab; Take 1 Tab by mouth every day.  Dispense: 90 Tab; Refill: 3    3. Recurrent major depressive disorder, remission status unspecified (HCC)  4. Anxiety  New to me, chronic patient is feeling well on Zoloft 25 mg.  Will continue. Denies any suicidal or homicidal ideation. Emphasized importance of healthy diet and exercise. Discussed that should the patient have any symptoms they should call suicide prevention hotline or report to the emergency room immediately.  - sertraline (ZOLOFT) 25 MG tablet; Take 1 Tab by mouth every day.  Dispense: 30 Tab; Refill: 11        Return in about 4 weeks (around 1/2/2020) for Pap.       Please note that this dictation was created using voice recognition software. I have made every reasonable attempt to correct obvious errors, but I expect that there are errors of grammar and  possibly content that I did not discover before finalizing the note.

## 2020-01-01 ENCOUNTER — HOSPITAL ENCOUNTER (OUTPATIENT)
Facility: MEDICAL CENTER | Age: 35
End: 2020-01-01
Attending: NURSE PRACTITIONER
Payer: COMMERCIAL

## 2020-01-01 ENCOUNTER — OFFICE VISIT (OUTPATIENT)
Dept: URGENT CARE | Facility: MEDICAL CENTER | Age: 35
End: 2020-01-01
Payer: COMMERCIAL

## 2020-01-01 VITALS
BODY MASS INDEX: 23.24 KG/M2 | OXYGEN SATURATION: 98 % | TEMPERATURE: 98.8 F | WEIGHT: 162 LBS | DIASTOLIC BLOOD PRESSURE: 68 MMHG | HEART RATE: 67 BPM | RESPIRATION RATE: 20 BRPM | SYSTOLIC BLOOD PRESSURE: 116 MMHG

## 2020-01-01 DIAGNOSIS — N76.0 ACUTE VAGINITIS: ICD-10-CM

## 2020-01-01 LAB
CANDIDA DNA VAG QL PROBE+SIG AMP: NEGATIVE
G VAGINALIS DNA VAG QL PROBE+SIG AMP: NEGATIVE
T VAGINALIS DNA VAG QL PROBE+SIG AMP: NEGATIVE

## 2020-01-01 PROCEDURE — 87510 GARDNER VAG DNA DIR PROBE: CPT

## 2020-01-01 PROCEDURE — 87480 CANDIDA DNA DIR PROBE: CPT

## 2020-01-01 PROCEDURE — 87591 N.GONORRHOEAE DNA AMP PROB: CPT

## 2020-01-01 PROCEDURE — 99214 OFFICE O/P EST MOD 30 MIN: CPT | Performed by: NURSE PRACTITIONER

## 2020-01-01 PROCEDURE — 87491 CHLMYD TRACH DNA AMP PROBE: CPT

## 2020-01-01 PROCEDURE — 87660 TRICHOMONAS VAGIN DIR PROBE: CPT

## 2020-01-01 ASSESSMENT — ENCOUNTER SYMPTOMS
FEVER: 0
VAGINITIS: 1
CHILLS: 0
GASTROINTESTINAL NEGATIVE: 1
RESPIRATORY NEGATIVE: 1
VOMITING: 0
ABDOMINAL PAIN: 0
NAUSEA: 0
CONSTITUTIONAL NEGATIVE: 1

## 2020-01-01 NOTE — PROGRESS NOTES
Subjective:     Juanita Montero is a 34 y.o. female who presents for UTI (bacterial infection x2 week)       Vaginitis   This is a new problem. Episode onset: 2 weeks ago. The problem has been waxing and waning. Pertinent negatives include no abdominal pain, chills, dysuria, fever, frequency, nausea, urgency or vomiting.     Patient reports vaginal odor and discharge.  Reports history of yeast infections and took a Diflucan while she was traveling.  Symptoms not improve so she went to a pharmacy and had an OTC swab which came back positive for bacterial vaginosis.  She reports that the pharmacy been gave her 5 metronidazole tablets which she was told to take all at once.  Symptoms did not go away.  Denies urinary symptoms.  Currently on her period.    PMH:  has a past medical history of Hashimoto's disease, Hyperthyroidism, and Restless leg syndrome.    MEDS:   Current Outpatient Medications:   •  levothyroxine (SYNTHROID) 200 MCG Tab, Take 200 mcg by mouth Every morning on an empty stomach., Disp: , Rfl:   •  levothyroxine (SYNTHROID) 25 MCG Tab, Take 25 mcg by mouth Every morning on an empty stomach., Disp: , Rfl:   •  sertraline (ZOLOFT) 25 MG tablet, Take 1 Tab by mouth every day., Disp: 30 Tab, Rfl: 11  •  liothyronine (CYTOMEL) 5 MCG Tab, Take 1 Tab by mouth every day., Disp: 90 Tab, Rfl: 3  •  albuterol 108 (90 BASE) MCG/ACT Aero Soln inhalation aerosol, Inhale 2 Puffs by mouth every 6 hours as needed for Shortness of Breath., Disp: 8.5 g, Rfl: 1    ALLERGIES:   Allergies   Allergen Reactions   • Eggs Anaphylaxis   • Shellfish Allergy Anaphylaxis   • Wheat Bran Anaphylaxis     SURGHX: History reviewed. No pertinent surgical history.    SOCHX:  reports that she has never smoked. She has never used smokeless tobacco. She reports current alcohol use of about 0.6 oz of alcohol per week. She reports that she does not use drugs.     FH: Reviewed with patient, not pertinent to this visit.    Review of  Systems   Constitutional: Negative.  Negative for chills, fever and malaise/fatigue.   Respiratory: Negative.    Gastrointestinal: Negative.  Negative for abdominal pain, nausea and vomiting.   Genitourinary: Negative for dysuria, frequency and urgency.        Vaginal odor, discharge   All other systems reviewed and are negative.    Objective:     /68   Pulse 67   Temp 37.1 °C (98.8 °F) (Temporal)   Resp 20   Wt 73.5 kg (162 lb)   LMP 12/02/2019 (Approximate)   SpO2 98%   BMI 23.24 kg/m²     Physical Exam  Vitals signs reviewed.   Constitutional:       General: She is not in acute distress.     Appearance: She is well-developed. She is not toxic-appearing or diaphoretic.   HENT:      Head: Normocephalic.      Right Ear: External ear normal.      Left Ear: External ear normal.      Nose: Nose normal.   Eyes:      Extraocular Movements: Extraocular movements intact.      Conjunctiva/sclera: Conjunctivae normal.   Neck:      Musculoskeletal: Normal range of motion.   Cardiovascular:      Rate and Rhythm: Normal rate.   Pulmonary:      Effort: Pulmonary effort is normal. No respiratory distress.   Abdominal:      General: There is no distension.      Palpations: Abdomen is soft.      Tenderness: There is no tenderness.   Musculoskeletal: Normal range of motion.         General: No deformity.   Skin:     General: Skin is warm and dry.      Coloration: Skin is not pale.   Neurological:      Mental Status: She is alert and oriented to person, place, and time.      Sensory: No sensory deficit.      Coordination: Coordination normal.   Psychiatric:         Behavior: Behavior normal. Behavior is cooperative.          Assessment/Plan:     1. Acute vaginitis  - VAGINAL PATHOGENS DNA PANEL; Future  - Chlamydia/GC PCR Urine Or Swab; Future    Vaginal pathogens and chlamydia/gonorrhea self swabs pending.    Discussed close monitoring and supportive measures including increasing fluids and rest as well as OTC symptom  management per 's instructions.    Vital signs stable, afebrile, asymptomatic, no acute distress.    Warning signs reviewed. Strict return/ER precautions advised.    Patient advised to: Return for 1) Symptoms don't improve or worsen, or go to ER, 2) Follow up with primary care in 7-10 days.    Differential diagnosis, natural history, supportive care, and indications for immediate follow-up discussed. All questions answered. Patient agrees with the plan of care.

## 2020-01-02 LAB
C TRACH DNA SPEC QL NAA+PROBE: NEGATIVE
N GONORRHOEA DNA SPEC QL NAA+PROBE: NEGATIVE
SPECIMEN SOURCE: NORMAL

## 2020-01-04 ENCOUNTER — PATIENT MESSAGE (OUTPATIENT)
Dept: URGENT CARE | Facility: PHYSICIAN GROUP | Age: 35
End: 2020-01-04

## 2020-01-04 DIAGNOSIS — Z86.19 HISTORY OF CANDIDAL VULVOVAGINITIS: ICD-10-CM

## 2020-01-04 DIAGNOSIS — N76.0 ACUTE VAGINITIS: ICD-10-CM

## 2020-01-04 RX ORDER — METRONIDAZOLE 500 MG/1
500 TABLET ORAL 2 TIMES DAILY
Qty: 14 TAB | Refills: 0 | Status: SHIPPED
Start: 2020-01-04 | End: 2020-01-09

## 2020-01-04 RX ORDER — FLUCONAZOLE 150 MG/1
150 TABLET ORAL
Qty: 2 TAB | Refills: 0 | Status: SHIPPED | OUTPATIENT
Start: 2020-01-04 | End: 2020-01-24 | Stop reason: SDUPTHER

## 2020-01-09 ENCOUNTER — OFFICE VISIT (OUTPATIENT)
Dept: MEDICAL GROUP | Facility: LAB | Age: 35
End: 2020-01-09
Payer: COMMERCIAL

## 2020-01-09 VITALS
HEIGHT: 70 IN | HEART RATE: 60 BPM | SYSTOLIC BLOOD PRESSURE: 100 MMHG | OXYGEN SATURATION: 96 % | TEMPERATURE: 98.8 F | BODY MASS INDEX: 23.19 KG/M2 | DIASTOLIC BLOOD PRESSURE: 64 MMHG | WEIGHT: 162 LBS

## 2020-01-09 DIAGNOSIS — N89.8 VAGINAL ODOR: ICD-10-CM

## 2020-01-09 DIAGNOSIS — T19.2XXA RETAINED TAMPON, INITIAL ENCOUNTER: ICD-10-CM
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
## 2020-01-09 PROCEDURE — 99214 OFFICE O/P EST MOD 30 MIN: CPT | Performed by: NURSE PRACTITIONER

## 2020-01-09 RX ORDER — CLINDAMYCIN HYDROCHLORIDE 300 MG/1
300 CAPSULE ORAL 3 TIMES DAILY
Qty: 21 CAP | Refills: 0 | Status: SHIPPED | OUTPATIENT
Start: 2020-01-09 | End: 2020-01-16

## 2020-01-09 RX ORDER — AMPICILLIN 500 MG/1
500 CAPSULE ORAL 4 TIMES DAILY
Qty: 40 CAP | Refills: 0 | Status: SHIPPED
Start: 2020-01-09 | End: 2020-06-22

## 2020-01-09 ASSESSMENT — PATIENT HEALTH QUESTIONNAIRE - PHQ9: CLINICAL INTERPRETATION OF PHQ2 SCORE: 0

## 2020-01-09 NOTE — PROGRESS NOTES
"CC:Diagnoses of Retained tampon, initial encounter and Vaginal odor were pertinent to this visit.    HISTORY OF PRESENT ILLNESS: Juanita Montero is a 34 y.o. female established patient who presents today to discuss the following problems:    Vaginal odor  This is a new problem.  Patient was seen in urgent care on 1/1/2019 where she self swabbed and did a full STD panel for new onset of vaginal odor and suspected BV versus candidiasis.  The swab did come back negative and her STD panel was negative but patient was started on Flagyl due to clinical symptoms.  She presented here today for her preventative Pap exam and upon insertion of the speculum it was noted that she had a retained tampon in place.  Patient believes this tampon has been in there for over 6 weeks.  She does report that she has not been feeling well but thought this was related to the Flagyl she was started on.  She had had a long trip to [place] recently over the holidays which is when symptoms originally started.  She did have very \"fishy\" smell originally but then she stated that the smell turned more towards an odor of \"bad breath\".  She stated that her  even noticed it.  It had not occurred to her that she may have forgotten to remove a tampon.      Health Maintenance: Completed    Patient Active Problem List    Diagnosis Date Noted   • Anxiety 12/05/2019   • Acquired hypothyroidism 08/29/2016   • Encounter for initial prescription of contraceptive pills 07/13/2016   • Attention deficit hyperactivity disorder 07/13/2016        Allergies:Eggs; Shellfish allergy; and Wheat bran    Current Outpatient Medications   Medication Sig Dispense Refill   • ampicillin (PRINCIPEN) 500 MG Cap Take 1 Cap by mouth 4 times a day. 40 Cap 0   • clindamycin (CLEOCIN) 300 MG Cap Take 1 Cap by mouth 3 times a day for 7 days. 21 Cap 0   • fluconazole (DIFLUCAN) 150 MG tablet Take 1 Tab by mouth every 72 hours. 2 Tab 0   • levothyroxine (SYNTHROID) 200 " "MCG Tab Take 200 mcg by mouth Every morning on an empty stomach.     • levothyroxine (SYNTHROID) 25 MCG Tab Take 25 mcg by mouth Every morning on an empty stomach.     • sertraline (ZOLOFT) 25 MG tablet Take 1 Tab by mouth every day. 30 Tab 11   • liothyronine (CYTOMEL) 5 MCG Tab Take 1 Tab by mouth every day. 90 Tab 3   • albuterol 108 (90 BASE) MCG/ACT Aero Soln inhalation aerosol Inhale 2 Puffs by mouth every 6 hours as needed for Shortness of Breath. 8.5 g 1     No current facility-administered medications for this visit.        Social History     Tobacco Use   • Smoking status: Never Smoker   • Smokeless tobacco: Never Used   Substance Use Topics   • Alcohol use: Yes     Alcohol/week: 0.6 oz     Types: 1 Shots of liquor per week     Comment: 1 x per year   • Drug use: No     Social History     Patient does not qualify to have social determinant information on file (likely too young).   Social History Narrative   • Not on file       Family History   Problem Relation Age of Onset   • Osteoporosis Mother    • No Known Problems Father        ROS:     No fevers or weight loss  No headaches or sore throat  No chest pain or shortness of breath  No bowel changes  No lower extremity edema  No suicidal ideation or panic attack        EXAM:   /64 (BP Location: Left arm, Patient Position: Sitting, BP Cuff Size: Adult)   Pulse 60   Temp 37.1 °C (98.8 °F) (Temporal)   Ht 1.778 m (5' 10\")   Wt 73.5 kg (162 lb)   SpO2 96%  Body mass index is 23.24 kg/m².    Constitutional: Well-developed and well-nourished. Not diaphoretic. No distress.   Skin: Skin is warm and dry. No rash noted.  Head: Atraumatic without lesions.  Neck: Supple, trachea midline. No thyromegaly present. No cervical or supraclavicular lymphadenopathy.  Cardiovascular: Regular rate and rhythm.   Chest: Effort normal. Clear to auscultation throughout. No adventitious sounds.   Abdomen: Soft, non tender, and without distention. Active bowel sounds in " all four quadrants. No rebound, guarding, masses or hepatosplenomegaly.  Pelvic Exam -  Normal external genitalia with no lesions.  Retained tampon in place.  IUD strings visualized.  Tampon removed with ring forceps.    Neurological: Alert and oriented x 3. Sensation intact.   Psychiatric:  Behavior, mood, and affect are appropriate.       ASSESSMENT/PLAN:    1. Retained tampon, initial encounter  2. Vaginal odor  New problem.  Tampon removed with ring forceps without problem.  Start on empiric abx.  Return for pap in 4 weeks.   - ampicillin (PRINCIPEN) 500 MG Cap; Take 1 Cap by mouth 4 times a day.  Dispense: 40 Cap; Refill: 0  - clindamycin (CLEOCIN) 300 MG Cap; Take 1 Cap by mouth 3 times a day for 7 days.  Dispense: 21 Cap; Refill: 0    Return in about 4 weeks (around 2/6/2020) for Pap.       Please note that this dictation was created using voice recognition software. I have made every reasonable attempt to correct obvious errors, but I expect that there are errors of grammar and possibly content that I did not discover before finalizing the note.

## 2020-01-24 DIAGNOSIS — Z86.19 HISTORY OF CANDIDAL VULVOVAGINITIS: ICD-10-CM

## 2020-01-24 RX ORDER — FLUCONAZOLE 150 MG/1
150 TABLET ORAL
Qty: 2 TAB | Refills: 0 | Status: SHIPPED | OUTPATIENT
Start: 2020-01-24 | End: 2020-03-21

## 2020-02-07 ENCOUNTER — HOSPITAL ENCOUNTER (OUTPATIENT)
Dept: LAB | Facility: MEDICAL CENTER | Age: 35
End: 2020-02-07
Attending: ALLERGY & IMMUNOLOGY
Payer: COMMERCIAL

## 2020-02-07 ENCOUNTER — HOSPITAL ENCOUNTER (OUTPATIENT)
Dept: LAB | Facility: MEDICAL CENTER | Age: 35
End: 2020-02-07
Attending: NURSE PRACTITIONER
Payer: COMMERCIAL

## 2020-02-07 DIAGNOSIS — E03.9 ACQUIRED HYPOTHYROIDISM: ICD-10-CM

## 2020-02-07 DIAGNOSIS — Z00.00 WELL ADULT EXAM: ICD-10-CM

## 2020-02-07 LAB
25(OH)D3 SERPL-MCNC: 37 NG/ML (ref 30–100)
ALBUMIN SERPL BCP-MCNC: 4 G/DL (ref 3.2–4.9)
ALBUMIN/GLOB SERPL: 1.5 G/DL
ALP SERPL-CCNC: 53 U/L (ref 30–99)
ALT SERPL-CCNC: 34 U/L (ref 2–50)
ANION GAP SERPL CALC-SCNC: 5 MMOL/L (ref 0–11.9)
AST SERPL-CCNC: 30 U/L (ref 12–45)
BASOPHILS # BLD AUTO: 0.6 % (ref 0–1.8)
BASOPHILS # BLD: 0.03 K/UL (ref 0–0.12)
BILIRUB SERPL-MCNC: 0.4 MG/DL (ref 0.1–1.5)
BUN SERPL-MCNC: 25 MG/DL (ref 8–22)
CALCIUM SERPL-MCNC: 9.1 MG/DL (ref 8.5–10.5)
CHLORIDE SERPL-SCNC: 104 MMOL/L (ref 96–112)
CHOLEST SERPL-MCNC: 149 MG/DL (ref 100–199)
CO2 SERPL-SCNC: 29 MMOL/L (ref 20–33)
CREAT SERPL-MCNC: 0.84 MG/DL (ref 0.5–1.4)
EOSINOPHIL # BLD AUTO: 0.09 K/UL (ref 0–0.51)
EOSINOPHIL NFR BLD: 1.9 % (ref 0–6.9)
ERYTHROCYTE [DISTWIDTH] IN BLOOD BY AUTOMATED COUNT: 44.1 FL (ref 35.9–50)
FASTING STATUS PATIENT QL REPORTED: NORMAL
GLOBULIN SER CALC-MCNC: 2.7 G/DL (ref 1.9–3.5)
GLUCOSE SERPL-MCNC: 82 MG/DL (ref 65–99)
HCT VFR BLD AUTO: 42.8 % (ref 37–47)
HDLC SERPL-MCNC: 61 MG/DL
HGB BLD-MCNC: 14 G/DL (ref 12–16)
IMM GRANULOCYTES # BLD AUTO: 0.01 K/UL (ref 0–0.11)
IMM GRANULOCYTES NFR BLD AUTO: 0.2 % (ref 0–0.9)
LDLC SERPL CALC-MCNC: 75 MG/DL
LYMPHOCYTES # BLD AUTO: 1.96 K/UL (ref 1–4.8)
LYMPHOCYTES NFR BLD: 40.9 % (ref 22–41)
MCH RBC QN AUTO: 31.4 PG (ref 27–33)
MCHC RBC AUTO-ENTMCNC: 32.7 G/DL (ref 33.6–35)
MCV RBC AUTO: 96 FL (ref 81.4–97.8)
MONOCYTES # BLD AUTO: 0.29 K/UL (ref 0–0.85)
MONOCYTES NFR BLD AUTO: 6.1 % (ref 0–13.4)
NEUTROPHILS # BLD AUTO: 2.41 K/UL (ref 2–7.15)
NEUTROPHILS NFR BLD: 50.3 % (ref 44–72)
NRBC # BLD AUTO: 0 K/UL
NRBC BLD-RTO: 0 /100 WBC
PLATELET # BLD AUTO: 278 K/UL (ref 164–446)
PMV BLD AUTO: 10.7 FL (ref 9–12.9)
POTASSIUM SERPL-SCNC: 4 MMOL/L (ref 3.6–5.5)
PROT SERPL-MCNC: 6.7 G/DL (ref 6–8.2)
RBC # BLD AUTO: 4.46 M/UL (ref 4.2–5.4)
SODIUM SERPL-SCNC: 138 MMOL/L (ref 135–145)
T4 FREE SERPL-MCNC: 1.14 NG/DL (ref 0.53–1.43)
TRIGL SERPL-MCNC: 64 MG/DL (ref 0–149)
TSH SERPL DL<=0.005 MIU/L-ACNC: 2.61 UIU/ML (ref 0.38–5.33)
WBC # BLD AUTO: 4.8 K/UL (ref 4.8–10.8)

## 2020-02-07 PROCEDURE — 82306 VITAMIN D 25 HYDROXY: CPT

## 2020-02-07 PROCEDURE — 86001 ALLERGEN SPECIFIC IGG: CPT

## 2020-02-07 PROCEDURE — 86003 ALLG SPEC IGE CRUDE XTRC EA: CPT | Mod: 91

## 2020-02-07 PROCEDURE — 80061 LIPID PANEL: CPT

## 2020-02-07 PROCEDURE — 80053 COMPREHEN METABOLIC PANEL: CPT

## 2020-02-07 PROCEDURE — 36415 COLL VENOUS BLD VENIPUNCTURE: CPT

## 2020-02-07 PROCEDURE — 84443 ASSAY THYROID STIM HORMONE: CPT

## 2020-02-07 PROCEDURE — 84439 ASSAY OF FREE THYROXINE: CPT

## 2020-02-07 PROCEDURE — 85025 COMPLETE CBC W/AUTO DIFF WBC: CPT

## 2020-02-07 PROCEDURE — 86008 ALLG SPEC IGE RECOMB EA: CPT

## 2020-02-09 LAB — BARLEY IGG-MCNC: 9.62 MCG/ML

## 2020-02-10 ENCOUNTER — TELEPHONE (OUTPATIENT)
Dept: MEDICAL GROUP | Facility: LAB | Age: 35
End: 2020-02-10

## 2020-02-10 LAB
ALMOND IGE QN: <0.1 KU/L
CHOCOLATE IGE QN: <0.1 KU/L
CINNAMON IGE QN: <0.1 KU/L
COCONUT IGE QN: <0.1 KU/L
COFFEE IGE QN: <0.1 KU/L
CRAB IGE QN: <0.1 KU/L
DEPRECATED MISC ALLERGEN IGE RAST QL: NORMAL
EGG WHITE IGE QN: <0.1 KU/L
OAT IGE QN: <0.1 KU/L
OYSTER IGE QN: <0.1 KU/L
SHRIMP IGE QN: <0.1 KU/L
STRAWBERRY IGE QN: <0.1 KU/L
SUNFLOWER SEED IGE QN: <0.1 KU/L
TEST NAME 95000: NORMAL
TUNA IGE QN: <0.1 KU/L
WHEAT IGE QN: 0.18 KU/L

## 2020-02-11 RX ORDER — LEVOTHYROXINE SODIUM 0.03 MG/1
25 TABLET ORAL
Qty: 90 TAB | Refills: 3 | Status: SHIPPED | OUTPATIENT
Start: 2020-02-11 | End: 2020-02-12 | Stop reason: SDUPTHER

## 2020-02-11 NOTE — TELEPHONE ENCOUNTER
Received request via: Patient    Was the patient seen in the last year in this department? Yes LOV 1/09/20    Does the patient have an active prescription (recently filled or refills available) for medication(s) requested? yes

## 2020-02-11 NOTE — TELEPHONE ENCOUNTER
1. Caller Name:Juanita                      Call Back Number: [phone] (home)       How would the patient prefer to be contacted with a response: Phone call OK to leave a detailed message    Pt called me asking to get her Synthroid filled since she had run out of it this morning. 2/10/20

## 2020-02-12 DIAGNOSIS — E03.9 ACQUIRED HYPOTHYROIDISM: ICD-10-CM

## 2020-02-12 RX ORDER — LIOTHYRONINE SODIUM 5 UG/1
5 TABLET ORAL DAILY
Qty: 90 TAB | Refills: 3 | Status: SHIPPED | OUTPATIENT
Start: 2020-02-12 | End: 2021-03-12 | Stop reason: SDUPTHER

## 2020-02-12 RX ORDER — LEVOTHYROXINE SODIUM 0.2 MG/1
200 TABLET ORAL
Qty: 90 TAB | Refills: 3 | Status: SHIPPED | OUTPATIENT
Start: 2020-02-12 | End: 2021-01-25

## 2020-02-12 RX ORDER — LEVOTHYROXINE SODIUM 0.03 MG/1
25 TABLET ORAL
Qty: 90 TAB | Refills: 3 | Status: SHIPPED | OUTPATIENT
Start: 2020-02-12 | End: 2021-03-01 | Stop reason: SDUPTHER

## 2020-03-21 ENCOUNTER — HOSPITAL ENCOUNTER (OUTPATIENT)
Facility: MEDICAL CENTER | Age: 35
End: 2020-03-21
Attending: PHYSICIAN ASSISTANT
Payer: COMMERCIAL

## 2020-03-21 ENCOUNTER — OFFICE VISIT (OUTPATIENT)
Dept: URGENT CARE | Facility: CLINIC | Age: 35
End: 2020-03-21
Payer: COMMERCIAL

## 2020-03-21 VITALS
BODY MASS INDEX: 22.96 KG/M2 | OXYGEN SATURATION: 97 % | RESPIRATION RATE: 16 BRPM | WEIGHT: 155 LBS | SYSTOLIC BLOOD PRESSURE: 100 MMHG | TEMPERATURE: 99 F | HEART RATE: 67 BPM | HEIGHT: 69 IN | DIASTOLIC BLOOD PRESSURE: 60 MMHG

## 2020-03-21 DIAGNOSIS — B96.89 BACTERIAL VAGINOSIS: ICD-10-CM

## 2020-03-21 DIAGNOSIS — N89.8 VAGINAL DISCHARGE: ICD-10-CM

## 2020-03-21 DIAGNOSIS — N76.0 BACTERIAL VAGINOSIS: ICD-10-CM

## 2020-03-21 DIAGNOSIS — T36.95XA ANTIBIOTIC-INDUCED YEAST INFECTION: ICD-10-CM

## 2020-03-21 DIAGNOSIS — B37.9 ANTIBIOTIC-INDUCED YEAST INFECTION: ICD-10-CM

## 2020-03-21 LAB
APPEARANCE UR: CLEAR
BILIRUB UR STRIP-MCNC: NORMAL MG/DL
COLOR UR AUTO: YELLOW
GLUCOSE UR STRIP.AUTO-MCNC: NORMAL MG/DL
INT CON NEG: NORMAL
INT CON POS: NORMAL
KETONES UR STRIP.AUTO-MCNC: NORMAL MG/DL
LEUKOCYTE ESTERASE UR QL STRIP.AUTO: NORMAL
NITRITE UR QL STRIP.AUTO: NORMAL
PH UR STRIP.AUTO: 6 [PH] (ref 5–8)
POC URINE PREGNANCY TEST: NORMAL
PROT UR QL STRIP: 30 MG/DL
RBC UR QL AUTO: NORMAL
SP GR UR STRIP.AUTO: 1.02
UROBILINOGEN UR STRIP-MCNC: 0.2 MG/DL

## 2020-03-21 PROCEDURE — 99214 OFFICE O/P EST MOD 30 MIN: CPT | Performed by: PHYSICIAN ASSISTANT

## 2020-03-21 PROCEDURE — 87510 GARDNER VAG DNA DIR PROBE: CPT

## 2020-03-21 PROCEDURE — 81002 URINALYSIS NONAUTO W/O SCOPE: CPT | Performed by: PHYSICIAN ASSISTANT

## 2020-03-21 PROCEDURE — 87480 CANDIDA DNA DIR PROBE: CPT

## 2020-03-21 PROCEDURE — 81025 URINE PREGNANCY TEST: CPT | Performed by: PHYSICIAN ASSISTANT

## 2020-03-21 PROCEDURE — 87660 TRICHOMONAS VAGIN DIR PROBE: CPT

## 2020-03-21 RX ORDER — FLUCONAZOLE 150 MG/1
150 TABLET ORAL DAILY
Qty: 1 TAB | Refills: 0 | Status: SHIPPED | OUTPATIENT
Start: 2020-03-21 | End: 2020-03-22

## 2020-03-21 RX ORDER — METRONIDAZOLE 500 MG/1
500 TABLET ORAL 2 TIMES DAILY
Qty: 14 TAB | Refills: 0 | Status: SHIPPED | OUTPATIENT
Start: 2020-03-21 | End: 2020-03-28

## 2020-03-21 ASSESSMENT — FIBROSIS 4 INDEX: FIB4 SCORE: 0.67

## 2020-03-21 ASSESSMENT — ENCOUNTER SYMPTOMS
COUGH: 0
EYE DISCHARGE: 0
VOMITING: 0
SHORTNESS OF BREATH: 0
NAUSEA: 0
EYE REDNESS: 0
SORE THROAT: 0
VAGINITIS: 1
HEADACHES: 0
FEVER: 0
ABDOMINAL PAIN: 0

## 2020-03-21 NOTE — PROGRESS NOTES
Subjective:      Juanita Montero is a 34 y.o. female who presents with Vaginal Discharge (pain with sex, oder )        Vaginitis   The patient's primary symptoms include a genital odor and vaginal discharge. This is a new problem. Episode onset: x 1 week ago. The problem occurs constantly. The problem has been unchanged. Associated symptoms include painful intercourse. Pertinent negatives include no abdominal pain, dysuria, fever, headaches, hematuria, joint pain, nausea, rash, sore throat or vomiting. The vaginal discharge was malodorous. There has been no bleeding. She has tried nothing for the symptoms.     The patient presents to clinic c/o vaginal discharge x 1 week. The patient describes her vaginal discharge clear and foul smelling. The patient also reports associated vaginal irritation and painful intercourse. The patient denies abnormal vaginal bleeding. The patient states she was recently treated for bacterial vaginosis on 2 separate occasions because she was having an abnormal discharge with a foul odor. The patient states her symptoms were actually due to a retained tampon. The patient states she saw her gynecologist who removed the tampon. The patient does not believe her symptoms are due to a retained tampon at this time. The patient hasn't taken anything for her current symptoms.     PMH:  has a past medical history of Hashimoto's disease, Hyperthyroidism, and Restless leg syndrome.  MEDS:   Current Outpatient Medications:   •  liothyronine (CYTOMEL) 5 MCG Tab, Take 1 Tab by mouth every day., Disp: 90 Tab, Rfl: 3  •  levothyroxine (SYNTHROID) 25 MCG Tab, Take 1 Tab by mouth Every morning on an empty stomach., Disp: 90 Tab, Rfl: 3  •  levothyroxine (SYNTHROID) 200 MCG Tab, Take 1 Tab by mouth Every morning on an empty stomach., Disp: 90 Tab, Rfl: 3  •  sertraline (ZOLOFT) 25 MG tablet, Take 1 Tab by mouth every day., Disp: 30 Tab, Rfl: 11  •  albuterol 108 (90 BASE) MCG/ACT Aero Soln  "inhalation aerosol, Inhale 2 Puffs by mouth every 6 hours as needed for Shortness of Breath., Disp: 8.5 g, Rfl: 1  •  fluconazole (DIFLUCAN) 150 MG tablet, Take 1 Tab by mouth every 72 hours. (Patient not taking: Reported on 3/21/2020), Disp: 2 Tab, Rfl: 0  •  ampicillin (PRINCIPEN) 500 MG Cap, Take 1 Cap by mouth 4 times a day. (Patient not taking: Reported on 3/21/2020), Disp: 40 Cap, Rfl: 0  ALLERGIES:   Allergies   Allergen Reactions   • Eggs Anaphylaxis   • Shellfish Allergy Anaphylaxis   • Wheat Bran Anaphylaxis     SURGHX: No past surgical history on file.  SOCHX:  reports that she has never smoked. She has never used smokeless tobacco. She reports current alcohol use of about 0.6 oz of alcohol per week. She reports that she does not use drugs.  FH: Family history was reviewed, no pertinent findings to report    Review of Systems   Constitutional: Negative for fever.   HENT: Negative for congestion, ear pain and sore throat.    Eyes: Negative for discharge and redness.   Respiratory: Negative for cough and shortness of breath.    Cardiovascular: Negative for chest pain and leg swelling.   Gastrointestinal: Negative for abdominal pain, nausea and vomiting.   Genitourinary: Positive for vaginal discharge. Negative for dysuria and hematuria.   Musculoskeletal: Negative for joint pain.   Skin: Negative for rash.   Neurological: Negative for headaches.          Objective:     /60   Pulse 67   Temp 37.2 °C (99 °F) (Temporal)   Resp 16   Ht 1.753 m (5' 9\")   Wt 70.3 kg (155 lb)   LMP 03/07/2020 (Exact Date)   SpO2 97%   BMI 22.89 kg/m²      Physical Exam  Constitutional:       General: She is not in acute distress.     Appearance: Normal appearance. She is well-developed. She is not ill-appearing.   HENT:      Head: Normocephalic and atraumatic.      Right Ear: External ear normal.      Left Ear: External ear normal.      Nose: Nose normal.   Eyes:      Conjunctiva/sclera: Conjunctivae normal.   Neck: "      Musculoskeletal: Normal range of motion and neck supple.   Cardiovascular:      Rate and Rhythm: Normal rate and regular rhythm.      Heart sounds: Normal heart sounds.   Pulmonary:      Effort: Pulmonary effort is normal.      Breath sounds: Normal breath sounds.   Abdominal:      Palpations: Abdomen is soft.      Tenderness: There is no abdominal tenderness.   Genitourinary:     Exam position: Supine.      Labia:         Right: No tenderness.         Left: No tenderness.       Vagina: No foreign body. Vaginal discharge present.      Cervix: Normal.   Musculoskeletal: Normal range of motion.   Skin:     General: Skin is warm and dry.   Neurological:      Mental Status: She is alert and oriented to person, place, and time.            Progress:  Results for orders placed or performed in visit on 03/21/20   POCT Urinalysis   Result Value Ref Range    POC Color YELLOW Negative    POC Appearance CLEAR Negative    POC Leukocyte Esterase NEG Negative    POC Nitrites NEG Negative    POC Urobiligen 0.2 Negative (0.2) mg/dL    POC Protein 30 Negative mg/dL    POC Urine PH 6.0 5.0 - 8.0    POC Blood NEG Negative    POC Specific Gravity 1.025 <1.005 - >1.030    POC Ketones NEG Negative mg/dL    POC Bilirubin NEG Negative mg/dL    POC Glucose NEG Negative mg/dL   POCT Pregnancy   Result Value Ref Range    POC Urine Pregnancy Test NEG Negative    Internal Control Positive Valid     Internal Control Negative Valid        Vaginal Pathogens - pending      Assessment/Plan:     1. Vaginal discharge  - POCT Urinalysis  - POCT Pregnancy  - VAGINAL PATHOGENS DNA PANEL; Future    2. Bacterial vaginosis  - metroNIDAZOLE (FLAGYL) 500 MG Tab; Take 1 Tab by mouth 2 Times a Day for 7 days.  Dispense: 14 Tab; Refill: 0    3. Antibiotic-induced yeast infection  - fluconazole (DIFLUCAN) 150 MG tablet; Take 1 Tab by mouth every day for 1 dose.  Dispense: 1 Tab; Refill: 0    The patient's presenting symptoms and physical exam are consistent  with vaginal discharge.  Based on the patient's presenting symptoms and physical exam findings, it is most likely that the patient's symptoms are due to bacterial vaginosis. The patient reports a history of same.  Will test the patient for the vaginal pathogens to further evaluate her current symptoms.  Will also prescribe the patient metronidazole for a presumed bacterial vaginosis infection.  The patient is currently not concerned about sexually transmitted diseases and states she recently had a full STD panel done, which was negative.  The patient is requesting a single dose of Diflucan at this time, as she is prone to antibiotic-induced yeast infections.  Will also prescribe the patient Diflucan.  Recommend the patient follow-up with her OB/GYN.  Discussed return precautions with the patient, and she verbalized understanding.    Differential diagnoses, supportive care, and indications for immediate follow-up discussed with patient.   Instructed to return to clinic or nearest emergency department for any change in condition, further concerns, or worsening of symptoms.    OTC Tylenol or Motrin for fever/discomfort.  Drink plenty of fluids  Follow-up with PCP  Return to clinic or go to the ED if symptoms worsen or fail to improve, or if the patient should develop worsening/increasing vaginal discharge, abnormal vaginal bleeding, pelvic pain, urinary symptoms, hematuria, flank pain, abdominal pain, nausea/vomiting, fever/chills, and/or any concerning symptoms.    Discussed plan with the patient, and she agrees to the above.     Please note that this dictation was created using voice recognition software. I have made every reasonable attempt to correct obvious errors, but I expect that there may be errors of grammar and possibly content that I did not discover before finalizing the note.

## 2020-03-22 DIAGNOSIS — N89.8 VAGINAL DISCHARGE: ICD-10-CM

## 2020-03-22 LAB
AMBIGUOUS DTTM AMBI4: NORMAL
SIGNIFICANT IND 70042: NORMAL
SITE SITE: NORMAL
SOURCE SOURCE: NORMAL

## 2020-03-24 LAB — TEST NAME 95000: ABNORMAL

## 2020-03-25 ENCOUNTER — TELEPHONE (OUTPATIENT)
Dept: URGENT CARE | Facility: CLINIC | Age: 35
End: 2020-03-25

## 2020-03-25 DIAGNOSIS — B37.31 VAGINAL YEAST INFECTION: ICD-10-CM

## 2020-03-25 RX ORDER — FLUCONAZOLE 150 MG/1
TABLET ORAL
Qty: 2 TAB | Refills: 0 | Status: SHIPPED | OUTPATIENT
Start: 2020-03-25 | End: 2020-04-01

## 2020-03-25 NOTE — TELEPHONE ENCOUNTER
Communicated with the patient via [employer].  Informed the patient her vaginal pathogen swab was positive for yeast.  Will prescribe the patient Diflucan for her current yeast infection.  Advised the patient she may want to continue the prescribed metronidazole given her recent history.  Recommend the patient return to clinic for any worsening or concerning symptoms.

## 2020-03-31 ENCOUNTER — HOSPITAL ENCOUNTER (EMERGENCY)
Facility: MEDICAL CENTER | Age: 35
End: 2020-04-01
Attending: EMERGENCY MEDICINE
Payer: COMMERCIAL

## 2020-03-31 DIAGNOSIS — N30.00 CYSTITIS, ACUTE: ICD-10-CM

## 2020-03-31 PROCEDURE — 99284 EMERGENCY DEPT VISIT MOD MDM: CPT

## 2020-03-31 PROCEDURE — 81001 URINALYSIS AUTO W/SCOPE: CPT

## 2020-03-31 PROCEDURE — 81025 URINE PREGNANCY TEST: CPT

## 2020-03-31 ASSESSMENT — FIBROSIS 4 INDEX: FIB4 SCORE: 0.67

## 2020-04-01 VITALS
SYSTOLIC BLOOD PRESSURE: 114 MMHG | BODY MASS INDEX: 24.24 KG/M2 | OXYGEN SATURATION: 94 % | HEIGHT: 70 IN | TEMPERATURE: 98.5 F | DIASTOLIC BLOOD PRESSURE: 73 MMHG | RESPIRATION RATE: 18 BRPM | WEIGHT: 169.31 LBS | HEART RATE: 50 BPM

## 2020-04-01 LAB
APPEARANCE UR: ABNORMAL
BACTERIA #/AREA URNS HPF: ABNORMAL /HPF
BILIRUB UR QL STRIP.AUTO: NEGATIVE
COLOR UR: ABNORMAL
EPI CELLS #/AREA URNS HPF: ABNORMAL /HPF
GLUCOSE UR STRIP.AUTO-MCNC: NEGATIVE MG/DL
HCG UR QL: NEGATIVE
KETONES UR STRIP.AUTO-MCNC: NEGATIVE MG/DL
LEUKOCYTE ESTERASE UR QL STRIP.AUTO: ABNORMAL
MICRO URNS: ABNORMAL
NITRITE UR QL STRIP.AUTO: NEGATIVE
PH UR STRIP.AUTO: 7.5 [PH] (ref 5–8)
PROT UR QL STRIP: 30 MG/DL
RBC # URNS HPF: ABNORMAL /HPF
RBC UR QL AUTO: ABNORMAL
SP GR UR STRIP.AUTO: 1.01
WBC #/AREA URNS HPF: ABNORMAL /HPF

## 2020-04-01 PROCEDURE — A9270 NON-COVERED ITEM OR SERVICE: HCPCS | Performed by: EMERGENCY MEDICINE

## 2020-04-01 PROCEDURE — 700102 HCHG RX REV CODE 250 W/ 637 OVERRIDE(OP): Performed by: EMERGENCY MEDICINE

## 2020-04-01 PROCEDURE — 99284 EMERGENCY DEPT VISIT MOD MDM: CPT

## 2020-04-01 RX ORDER — CEPHALEXIN 500 MG/1
500 CAPSULE ORAL 3 TIMES DAILY
Qty: 15 CAP | Refills: 0 | Status: SHIPPED | OUTPATIENT
Start: 2020-04-01 | End: 2020-04-06

## 2020-04-01 RX ORDER — PHENAZOPYRIDINE HYDROCHLORIDE 200 MG/1
200 TABLET, FILM COATED ORAL 3 TIMES DAILY PRN
Qty: 6 TAB | Refills: 0 | Status: SHIPPED | OUTPATIENT
Start: 2020-04-01 | End: 2020-06-22

## 2020-04-01 RX ORDER — FLUCONAZOLE 150 MG/1
150 TABLET ORAL
Qty: 2 TAB | Refills: 0 | Status: SHIPPED | OUTPATIENT
Start: 2020-04-01 | End: 2020-06-22

## 2020-04-01 RX ORDER — CEPHALEXIN 250 MG/1
500 CAPSULE ORAL ONCE
Status: COMPLETED | OUTPATIENT
Start: 2020-04-01 | End: 2020-04-01

## 2020-04-01 RX ORDER — PHENAZOPYRIDINE HYDROCHLORIDE 200 MG/1
100 TABLET, FILM COATED ORAL ONCE
Status: DISCONTINUED | OUTPATIENT
Start: 2020-04-01 | End: 2020-04-01 | Stop reason: CLARIF

## 2020-04-01 RX ORDER — PHENAZOPYRIDINE HYDROCHLORIDE 200 MG/1
200 TABLET, FILM COATED ORAL ONCE
Status: COMPLETED | OUTPATIENT
Start: 2020-04-01 | End: 2020-04-01

## 2020-04-01 RX ADMIN — PHENAZOPYRIDINE 200 MG: 200 TABLET ORAL at 00:38

## 2020-04-01 RX ADMIN — CEPHALEXIN 500 MG: 250 CAPSULE ORAL at 00:33

## 2020-04-01 NOTE — ED PROVIDER NOTES
"ED Provider Note    ED Provider Note    Scribed for Delfina Lozano MD by Delfina Lozano M.D.. 3/31/2020, 11:51 PM.    Primary care provider: Pcp Pt States None  Means of arrival: Private  History obtained from: Patient  History limited by: None    CHIEF COMPLAINT  Dysuria    HPI  Juanita Montero is a 34 y.o. female who presents to the Emergency Department for evaluation of urinary urgency and dysuria.  This started today at about 8 PM.  She also noted some associated hematuria.  Symptoms are very similar to when she has had a \"bladder infection\" in the past.  She does note some pain radiating from the suprapubic region of the abdomen toward the right flank but no left flank pain.  No fever, no nausea, no vomiting but she does endorse some chills.  No trauma, no personal history of kidney stones, she is not anticoagulated.  Patient does not feel she is pregnant noting she uses an IUD, she has no vaginal discharge, she took a negative pregnancy test about 1 week ago.  She notes an allergy to ibuprofen in general stays away from all over-the-counter analgesics, she has tried some cranberry tablets at home this evening with no improvement as of yet.  Pain is worse when she is attempting to urinate, no clear alleviating factors.    REVIEW OF SYSTEMS  Pertinent positives include dysuria, hematuria, urinary urgency and hesitancy. Pertinent negatives include no fever, no vomiting.      PAST MEDICAL HISTORY   has a past medical history of Hashimoto's disease, Hyperthyroidism, and Restless leg syndrome.    SURGICAL HISTORY  patient denies any surgical history    SOCIAL HISTORY  Social History     Tobacco Use   • Smoking status: Never Smoker   • Smokeless tobacco: Never Used   Substance Use Topics   • Alcohol use: Yes     Alcohol/week: 0.6 oz     Types: 1 Shots of liquor per week     Comment: 1 x per year   • Drug use: No      Social History     Substance and Sexual Activity   Drug Use No       FAMILY " "HISTORY  Family History   Problem Relation Age of Onset   • Osteoporosis Mother    • No Known Problems Father    History of kidney stones and sister    CURRENT MEDICATIONS  Home Medications    **Home medications have not yet been reviewed for this encounter**         ALLERGIES  Allergies   Allergen Reactions   • Eggs Anaphylaxis   • Shellfish Allergy Anaphylaxis   • Wheat Bran Anaphylaxis   • Ibuprofen Anaphylaxis       PHYSICAL EXAM  VITAL SIGNS: /73   Pulse (!) 50   Temp 36.9 °C (98.5 °F) (Temporal)   Resp 18   Ht 1.778 m (5' 10\")   Wt 76.8 kg (169 lb 5 oz)   LMP 03/07/2020 (Exact Date)   SpO2 94%   BMI 24.29 kg/m²     General: Alert, no acute distress  Skin: Warm, dry, normal for ethnicity  Head: Normocephalic, atraumatic  Neck: Trachea midline, no tenderness  Eye: PERRL, normal conjunctiva  ENMT: Oral mucosa moist, no pharyngeal erythema or exudate  Cardiovascular: Normal peripheral perfusion  Respiratory: Respirations are non-labored, breath sounds are equal  Gastrointestinal: Soft, mild suprapubic discomfort with palpation but no significant tenderness throughout the abdomen, non distended.  No CVA tenderness.  Musculoskeletal: No swelling, no deformity  Neurological: Alert and oriented to person, place, time, and situation  Lymphatics: No lymphadenopathy  Psychiatric: Cooperative, appropriate mood & affect      DIAGNOSTIC STUDIES/PROCEDURES    LABS  Results for orders placed or performed during the hospital encounter of 03/31/20   URINALYSIS,CULTURE IF INDICATED   Result Value Ref Range    Color Pink     Character Hazy (A)     Specific Gravity 1.010 <1.035    Ph 7.5 5.0 - 8.0    Glucose Negative Negative mg/dL    Ketones Negative Negative mg/dL    Protein 30 (A) Negative mg/dL    Bilirubin Negative Negative    Nitrite Negative Negative    Leukocyte Esterase Small (A) Negative    Occult Blood Large (A) Negative    Micro Urine Req Microscopic    BETA-HCG QUALITATIVE URINE   Result Value Ref Range " "   Beta-Hcg Urine Negative Negative   URINE MICROSCOPIC (W/UA)   Result Value Ref Range    WBC 10-20 (A) /hpf    RBC 20-50 (A) /hpf    Bacteria Rare (A) None /hpf    Epithelial Cells Rare Few /hpf     All labs reviewed by me.      COURSE & MEDICAL DECISION MAKING  Pertinent Labs & Imaging studies reviewed. (See chart for details)    11:51 PM - Patient seen and examined at bedside. Patient will be treated with Pyridium, cephalexin. Ordered urinalysis, urine hCG and urine culture to evaluate her symptoms. The differential diagnoses include but are not limited to: Cystitis    0038: Re-assessed, remains in NAD.    Patient Vitals for the past 24 hrs:   BP Temp Temp src Pulse Resp SpO2 Height Weight   04/01/20 0039 114/73 36.9 °C (98.5 °F) Temporal (!) 50 18 94 % -- --   03/31/20 2330 118/83 37.1 °C (98.8 °F) Temporal (!) 50 18 97 % -- --   03/31/20 2329 -- -- -- -- -- -- 1.778 m (5' 10\") 76.8 kg (169 lb 5 oz)         Decision Making:  This is a 34 y.o. year old female who presents with suprapubic pain with dysuria and urinary urgency and hesitancy and hematuria.  She has no CVA tenderness and no fever as such I doubt obstructive uropathy or pyelonephritis.  Urine is obviously infected, culture is sent and pending.  Antibiotics will be initiated.  No evidence of septicemia and no indication for inpatient management.    The patient will return for new or worsening symptoms and is stable at the time of discharge.      DISPOSITION:  Patient will be discharged home in stable condition.    FOLLOW UP:  Sinai Jolley M.D.  [address]  [address]  [facility] [id number] [phone]    Schedule an appointment as soon as possible for a visit         OUTPATIENT MEDICATIONS:  Discharge Medication List as of 4/1/2020 12:47 AM      START taking these medications    Details   cephALEXin (KEFLEX) 500 MG Cap Take 1 Cap by mouth 3 times a day for 5 days., Disp-15 Cap, R-0, Print Rx Paper      phenazopyridine (PYRIDIUM) 200 MG Tab Take 1 " Tab by mouth 3 times a day as needed for Mild Pain., Disp-6 Tab, R-0, Print Rx Paper               FINAL IMPRESSION  1. Cystitis, acute          Delfina NAQVI M.D. (Scribe), am scribing for, and in the presence of, Delfina Lozano MD.    Electronically signed by: Delfina Lozano M.D. (Scribe), 3/31/2020    IDelfina MD personally performed the services described in this documentation, as scribed by Delfina Lozano M.D. in my presence, and it is both accurate and complete    The note accurately reflects work and decisions made by me.  Delfina Lozano M.D.  4/1/2020  2:32 AM

## 2020-06-11 ENCOUNTER — HOSPITAL ENCOUNTER (OUTPATIENT)
Facility: MEDICAL CENTER | Age: 35
End: 2020-06-11
Attending: PHYSICIAN ASSISTANT
Payer: COMMERCIAL

## 2020-06-11 ENCOUNTER — OFFICE VISIT (OUTPATIENT)
Dept: URGENT CARE | Facility: CLINIC | Age: 35
End: 2020-06-11
Payer: COMMERCIAL

## 2020-06-11 VITALS
HEART RATE: 64 BPM | BODY MASS INDEX: 22.19 KG/M2 | HEIGHT: 70 IN | OXYGEN SATURATION: 97 % | SYSTOLIC BLOOD PRESSURE: 120 MMHG | DIASTOLIC BLOOD PRESSURE: 74 MMHG | WEIGHT: 155 LBS | RESPIRATION RATE: 16 BRPM | TEMPERATURE: 98.6 F

## 2020-06-11 DIAGNOSIS — N89.8 VAGINAL DISCHARGE: ICD-10-CM

## 2020-06-11 PROCEDURE — 87480 CANDIDA DNA DIR PROBE: CPT

## 2020-06-11 PROCEDURE — 87510 GARDNER VAG DNA DIR PROBE: CPT

## 2020-06-11 PROCEDURE — 87660 TRICHOMONAS VAGIN DIR PROBE: CPT

## 2020-06-11 PROCEDURE — 99214 OFFICE O/P EST MOD 30 MIN: CPT | Performed by: PHYSICIAN ASSISTANT

## 2020-06-11 RX ORDER — FLUCONAZOLE 150 MG/1
150 TABLET ORAL ONCE
Qty: 1 TAB | Refills: 1 | Status: SHIPPED | OUTPATIENT
Start: 2020-06-11 | End: 2020-06-11

## 2020-06-11 ASSESSMENT — ENCOUNTER SYMPTOMS
DIZZINESS: 0
COUGH: 0
VOMITING: 0
CHILLS: 0
FLANK PAIN: 0
FEVER: 0
SORE THROAT: 0
NAUSEA: 0
BACK PAIN: 0
ABDOMINAL PAIN: 0
VAGINITIS: 1
HEADACHES: 0

## 2020-06-11 ASSESSMENT — FIBROSIS 4 INDEX: FIB4 SCORE: 0.68

## 2020-06-11 NOTE — PROGRESS NOTES
Subjective:   Juanita Montero is a 35 y.o. female who presents for Vaginitis (white discharge x2 days)      Vaginitis   The patient's primary symptoms include pelvic pain (Discomfort with intercourse.) and vaginal discharge. The patient's pertinent negatives include no genital itching, genital lesions, genital odor, genital rash, missed menses or vaginal bleeding. This is a new problem. The current episode started yesterday. The problem has been unchanged. The pain is mild. She is not pregnant. Associated symptoms include painful intercourse. Pertinent negatives include no abdominal pain, back pain, chills, discolored urine, dysuria, fever, flank pain, frequency, headaches, hematuria, nausea, rash, sore throat, urgency or vomiting. The vaginal discharge was milky. There has been no bleeding. She has not been passing clots. She has not been passing tissue. The symptoms are aggravated by intercourse. She has tried nothing for the symptoms. She is sexually active (Monogamous relationship.). No, her partner does not have an STD. Her menstrual history has been regular. Her past medical history is significant for vaginosis (Recurrent episodes of bacterial vaginosis and candidiasis.).       Review of Systems   Constitutional: Negative for chills and fever.   HENT: Negative for sore throat.    Respiratory: Negative for cough.    Cardiovascular: Negative for chest pain.   Gastrointestinal: Negative for abdominal pain, nausea and vomiting.   Genitourinary: Positive for pelvic pain (Discomfort with intercourse.) and vaginal discharge. Negative for dysuria, flank pain, frequency, hematuria, missed menses and urgency.   Musculoskeletal: Negative for back pain.   Skin: Negative for rash.   Neurological: Negative for dizziness and headaches.       Medications:    • albuterol Aers  • ampicillin Caps  • fluconazole  • levothyroxine Tabs  • liothyronine Tabs  • phenazopyridine Tabs  • sertraline    Allergies: Eggs;  "Shellfish allergy; Wheat bran; and Ibuprofen    Problem List: Juanita Montero has Encounter for initial prescription of contraceptive pills; Attention deficit hyperactivity disorder; Acquired hypothyroidism; and Anxiety on their problem list.    Surgical History:  No past surgical history on file.    Past Social Hx: Juanita Montero  reports that she has never smoked. She has never used smokeless tobacco. She reports current alcohol use of about 0.6 oz of alcohol per week. She reports that she does not use drugs.     Past Family Hx:  Juanita Montero family history includes No Known Problems in her father; Osteoporosis in her mother.     Problem list, medications, and allergies reviewed by myself today in Epic.     Objective:     /74   Pulse 64   Temp 37 °C (98.6 °F)   Resp 16   Ht 1.778 m (5' 10\")   Wt 70.3 kg (155 lb)   SpO2 97%   BMI 22.24 kg/m²     Physical Exam  Constitutional:       General: She is not in acute distress.     Appearance: Normal appearance. She is not ill-appearing, toxic-appearing or diaphoretic.   HENT:      Head: Normocephalic and atraumatic.      Nose: Nose normal. No congestion or rhinorrhea.   Eyes:      Conjunctiva/sclera: Conjunctivae normal.   Neck:      Musculoskeletal: Normal range of motion. No muscular tenderness.   Cardiovascular:      Rate and Rhythm: Normal rate and regular rhythm.      Pulses: Normal pulses.      Heart sounds: Normal heart sounds.   Pulmonary:      Effort: Pulmonary effort is normal.      Breath sounds: Normal breath sounds. No wheezing.   Abdominal:      General: There is no distension.      Palpations: Abdomen is soft.      Tenderness: There is no abdominal tenderness. There is no right CVA tenderness, left CVA tenderness or guarding.   Lymphadenopathy:      Cervical: No cervical adenopathy.   Skin:     General: Skin is warm and dry.      Capillary Refill: Capillary refill takes less than 2 seconds.   Neurological:      " Mental Status: She is alert.   Psychiatric:         Mood and Affect: Mood normal.         Thought Content: Thought content normal.       Vaginal pathogens: Pending    Assessment/Plan:     Diagnosis and associated orders:     1. Vaginal discharge  VAGINAL PATHOGENS DNA PANEL    fluconazole (DIFLUCAN) 150 MG tablet      Comments/MDM:       Differential diagnoses and treatment options discussed with the patient at length today.  The patient's past medical history significant for recurrent bouts of bacterial vaginosis and vaginal yeast infections.  She states this feels very similar to her last vaginal yeast infection.  At this time she would like to do a self vaginal swab to test for vaginal yeast and bacterial vaginosis.  I will call the patient in 2 days with results and adjust medications as needed.  Empiric prescription for vaginal yeast infection was provided today.  The patient may take this if symptoms persist.  The patient denied having a GC chlamydia swab done today.  I will call the patient and change medications if necessary once culture results are available.  List of red flag symptoms provided for the patient and need for immediate follow-up.  The patient agreed to this plan of care.             Differential diagnosis, natural history, supportive care, and indications for immediate follow-up discussed.    Advised the patient to follow-up with the primary care physician for recheck, reevaluation, and consideration of further management.    Please note that this dictation was created using voice recognition software. I have made reasonable attempt to correct obvious errors, but I expect that there are errors of grammar and possibly content that I did not discover before finalizing the note.    This note was electronically signed by SAMANTHA Dobson PA-C

## 2020-06-11 NOTE — PATIENT INSTRUCTIONS
Bacterial Vaginosis  Bacterial vaginosis is a vaginal infection that occurs when the normal balance of bacteria in the vagina is disrupted. It results from an overgrowth of certain bacteria. This is the most common vaginal infection among women ages 15-44.  Because bacterial vaginosis increases your risk for STIs (sexually transmitted infections), getting treated can help reduce your risk for chlamydia, gonorrhea, herpes, and HIV (human immunodeficiency virus). Treatment is also important for preventing complications in pregnant women, because this condition can cause an early (premature) delivery.  What are the causes?  This condition is caused by an increase in harmful bacteria that are normally present in small amounts in the vagina. However, the reason that the condition develops is not fully understood.  What increases the risk?  The following factors may make you more likely to develop this condition:  · Having a new sexual partner or multiple sexual partners.  · Having unprotected sex.  · Douching.  · Having an intrauterine device (IUD).  · Smoking.  · Drug and alcohol abuse.  · Taking certain antibiotic medicines.  · Being pregnant.  You cannot get bacterial vaginosis from toilet seats, bedding, swimming pools, or contact with objects around you.  What are the signs or symptoms?  Symptoms of this condition include:  · Grey or white vaginal discharge. The discharge can also be watery or foamy.  · A fish-like odor with discharge, especially after sexual intercourse or during menstruation.  · Itching in and around the vagina.  · Burning or pain with urination.  Some women with bacterial vaginosis have no signs or symptoms.  How is this diagnosed?  This condition is diagnosed based on:  · Your medical history.  · A physical exam of the vagina.  · Testing a sample of vaginal fluid under a microscope to look for a large amount of bad bacteria or abnormal cells. Your health care provider may use a cotton swab or a  small wooden spatula to collect the sample.  How is this treated?  This condition is treated with antibiotics. These may be given as a pill, a vaginal cream, or a medicine that is put into the vagina (suppository). If the condition comes back after treatment, a second round of antibiotics may be needed.  Follow these instructions at home:  Medicines  · Take over-the-counter and prescription medicines only as told by your health care provider.  · Take or use your antibiotic as told by your health care provider. Do not stop taking or using the antibiotic even if you start to feel better.  General instructions  · If you have a female sexual partner, tell her that you have a vaginal infection. She should see her health care provider and be treated if she has symptoms. If you have a male sexual partner, he does not need treatment.  · During treatment:  ¨ Avoid sexual activity until you finish treatment.  ¨ Do not douche.  ¨ Avoid alcohol as directed by your health care provider.  ¨ Avoid breastfeeding as directed by your health care provider.  · Drink enough water and fluids to keep your urine clear or pale yellow.  · Keep the area around your vagina and rectum clean.  ¨ Wash the area daily with warm water.  ¨ Wipe yourself from front to back after using the toilet.  · Keep all follow-up visits as told by your health care provider. This is important.  How is this prevented?  · Do not douche.  · Wash the outside of your vagina with warm water only.  · Use protection when having sex. This includes latex condoms and dental dams.  · Limit how many sexual partners you have. To help prevent bacterial vaginosis, it is best to have sex with just one partner (monogamous).  · Make sure you and your sexual partner are tested for STIs.  · Wear cotton or cotton-lined underwear.  · Avoid wearing tight pants and pantyhose, especially during summer.  · Limit the amount of alcohol that you drink.  · Do not use any products that contain  nicotine or tobacco, such as cigarettes and e-cigarettes. If you need help quitting, ask your health care provider.  · Do not use illegal drugs.  Where to find more information:  · Centers for Disease Control and Prevention: www.cdc.gov/std  · American Sexual Health Association (MEHRDAD): www.ashastd.org  · U.S. Department of Health and Human Services, Office on Women's Health: www.womenshealth.gov/ or https://www.womenshealth.gov/a-z-topics/bacterial-vaginosis  Contact a health care provider if:  · Your symptoms do not improve, even after treatment.  · You have more discharge or pain when urinating.  · You have a fever.  · You have pain in your abdomen.  · You have pain during sex.  · You have vaginal bleeding between periods.  Summary  · Bacterial vaginosis is a vaginal infection that occurs when the normal balance of bacteria in the vagina is disrupted.  · Because bacterial vaginosis increases your risk for STIs (sexually transmitted infections), getting treated can help reduce your risk for chlamydia, gonorrhea, herpes, and HIV (human immunodeficiency virus). Treatment is also important for preventing complications in pregnant women, because the condition can cause an early (premature) delivery.  · This condition is treated with antibiotic medicines. These may be given as a pill, a vaginal cream, or a medicine that is put into the vagina (suppository).  This information is not intended to replace advice given to you by your health care provider. Make sure you discuss any questions you have with your health care provider.  Document Released: 12/18/2006 Document Revised: 09/02/2017 Document Reviewed: 09/02/2017  [employer] Interactive Patient Education © 2017 [employer]  Vaginal Yeast infection, Adult  Vaginal yeast infection is a condition that causes soreness, swelling, and redness (inflammation) of the vagina. It also causes vaginal discharge. This is a common condition. Some women get this infection  frequently.  What are the causes?  This condition is caused by a change in the normal balance of the yeast (candida) and bacteria that live in the vagina. This change causes an overgrowth of yeast, which causes the inflammation.  What increases the risk?  This condition is more likely to develop in:  · Women who take antibiotic medicines.  · Women who have diabetes.  · Women who take birth control pills.  · Women who are pregnant.  · Women who douche often.  · Women who have a weak defense (immune) system.  · Women who have been taking steroid medicines for a long time.  · Women who frequently wear tight clothing.  What are the signs or symptoms?  Symptoms of this condition include:  · White, thick vaginal discharge.  · Swelling, itching, redness, and irritation of the vagina. The lips of the vagina (vulva) may be affected as well.  · Pain or a burning feeling while urinating.  · Pain during sex.  How is this diagnosed?  This condition is diagnosed with a medical history and physical exam. This will include a pelvic exam. Your health care provider will examine a sample of your vaginal discharge under a microscope. Your health care provider may send this sample for testing to confirm the diagnosis.  How is this treated?  This condition is treated with medicine. Medicines may be over-the-counter or prescription. You may be told to use one or more of the following:  · Medicine that is taken orally.  · Medicine that is applied as a cream.  · Medicine that is inserted directly into the vagina (suppository).  Follow these instructions at home:  · Take or apply over-the-counter and prescription medicines only as told by your health care provider.  · Do not have sex until your health care provider has approved. Tell your sex partner that you have a yeast infection. That person should go to his or her health care provider if he or she develops symptoms.  · Do not wear tight clothes, such as pantyhose or tight pants.  · Avoid  using tampons until your health care provider approves.  · Eat more yogurt. This may help to keep your yeast infection from returning.  · Try taking a sitz bath to help with discomfort. This is a warm water bath that is taken while you are sitting down. The water should only come up to your hips and should cover your buttocks. Do this 3-4 times per day or as told by your health care provider.  · Do not douche.  · Wear breathable, cotton underwear.  · If you have diabetes, keep your blood sugar levels under control.  Contact a health care provider if:  · You have a fever.  · Your symptoms go away and then return.  · Your symptoms do not get better with treatment.  · Your symptoms get worse.  · You have new symptoms.  · You develop blisters in or around your vagina.  · You have blood coming from your vagina and it is not your menstrual period.  · You develop pain in your abdomen.  This information is not intended to replace advice given to you by your health care provider. Make sure you discuss any questions you have with your health care provider.  Document Released: 09/27/2006 Document Revised: 05/31/2017 Document Reviewed: 06/20/2016  [employer] Interactive Patient Education © 2017 [employer]

## 2020-06-12 ENCOUNTER — TELEPHONE (OUTPATIENT)
Dept: URGENT CARE | Facility: CLINIC | Age: 35
End: 2020-06-12

## 2020-06-12 LAB
CANDIDA DNA VAG QL PROBE+SIG AMP: POSITIVE
G VAGINALIS DNA VAG QL PROBE+SIG AMP: NEGATIVE
T VAGINALIS DNA VAG QL PROBE+SIG AMP: NEGATIVE

## 2020-06-12 NOTE — TELEPHONE ENCOUNTER
I called the patient today and left a message on her voicemail.  I informed her of her test results.  Prescription was previously sent to the pharmacy.  She may begin to take this prescription please call with any questions or concerns.     Thank you,   AJ

## 2020-06-15 DIAGNOSIS — B37.31 CANDIDA VAGINITIS: ICD-10-CM

## 2020-06-15 RX ORDER — FLUCONAZOLE 150 MG/1
150 TABLET ORAL DAILY
Qty: 2 TAB | Refills: 0 | Status: SHIPPED | OUTPATIENT
Start: 2020-06-15 | End: 2020-06-22

## 2020-06-15 NOTE — PROGRESS NOTES
Rx for Diflucan sent to pharmacy. Left msg on pts voicemail. Please call with any questions or concerns.     Thank you,  AJ

## 2020-06-22 ENCOUNTER — TELEMEDICINE (OUTPATIENT)
Dept: MEDICAL GROUP | Facility: IMAGING CENTER | Age: 35
End: 2020-06-22
Payer: COMMERCIAL

## 2020-06-22 VITALS — HEART RATE: 84 BPM | HEIGHT: 70 IN | WEIGHT: 155 LBS | BODY MASS INDEX: 22.19 KG/M2

## 2020-06-22 DIAGNOSIS — J45.20 MILD INTERMITTENT ASTHMA WITHOUT COMPLICATION: ICD-10-CM

## 2020-06-22 DIAGNOSIS — J40 BRONCHITIS: ICD-10-CM

## 2020-06-22 DIAGNOSIS — E03.9 ACQUIRED HYPOTHYROIDISM: ICD-10-CM

## 2020-06-22 DIAGNOSIS — F90.9 ATTENTION DEFICIT HYPERACTIVITY DISORDER (ADHD), UNSPECIFIED ADHD TYPE: ICD-10-CM

## 2020-06-22 DIAGNOSIS — R05.9 COUGH: ICD-10-CM

## 2020-06-22 PROCEDURE — 99213 OFFICE O/P EST LOW 20 MIN: CPT | Performed by: FAMILY MEDICINE

## 2020-06-22 RX ORDER — ALBUTEROL SULFATE 90 UG/1
2 AEROSOL, METERED RESPIRATORY (INHALATION) EVERY 6 HOURS PRN
Qty: 8.5 G | Refills: 1 | Status: SHIPPED | OUTPATIENT
Start: 2020-06-22 | End: 2022-08-17 | Stop reason: SDUPTHER

## 2020-06-22 RX ORDER — LISDEXAMFETAMINE DIMESYLATE 30 MG/1
40 CAPSULE ORAL EVERY MORNING
COMMUNITY
Start: 2020-06-17 | End: 2021-05-27

## 2020-06-22 SDOH — HEALTH STABILITY: MENTAL HEALTH: HOW OFTEN DO YOU HAVE A DRINK CONTAINING ALCOHOL?: MONTHLY OR LESS

## 2020-06-22 ASSESSMENT — FIBROSIS 4 INDEX: FIB4 SCORE: 0.68

## 2020-06-22 ASSESSMENT — PAIN SCALES - GENERAL: PAINLEVEL: NO PAIN

## 2020-06-22 NOTE — ASSESSMENT & PLAN NOTE
On vyvance. She can continue with psychiatry. Or provide our office with the official adhd testing records then I can prescribe.

## 2020-06-22 NOTE — PROGRESS NOTES
Telemedicine Visit: New Patient     This encounter was conducted via Zoom .   Verbal consent was obtained. Patient's identity was verified. Patient aware this will be   billed the same as an in person evaluation.    Subjective:     Chief Complaint   Patient presents with   • Establish Care     Juanita Montero is a 35 y.o. female with history of Hashimoto's thyroiditis, ADHD, anxiety, seasonal depression, recurrent UTIs, mild intermtitent asthma on virtual visit to discuss establish care.     RLS in chart and clarifying at this visit: 2013 with leg pain went to the er once. It persistented for some time then resolved. She does not take anything for it. She noticed it was associated with certain foods she was eating and does not have issues with it anymore. She did have nerve pain aslo associated with her divorse feels that might be a stress response.     Aug 2019 did have annual labs though no annual exam. Tested positive for hpv 2018. Has not had repeat pap since.     Mild asthma since 14 yo. Rarely uses inhaler. Usually expires before she finishes. Usually allergy and exercise induced or bronchitis she gets sob, though no wheeze with cough.     She just started vyvance. Has a more recent evaluation by psychiatry. Did not like the non stimulate treatment as she experienced side effects from the medicaiton. She just started vyvance had trailed off for a few years.     Mother with osteoporosis. Sister with ra and osteoporosis at 40yo. patient does not drink heavily, never smoked, weight lifts nearly daily. Eats a very healthy diet. She has taken oral steroids once in her life she experienced severe allergic reaction and was hospitalized. That was about one week of treatment.     ROS  See HPI  Constitutional: Negative for fever, chills and malaise/fatigue.   HENT: Negative for congestion, itchy watery eyes  Eyes: Negative for pain or sudden changes in vision  Respiratory: Negative for cough and shortness of  breath.    Cardiovascular: Negative for leg swelling or chest pain  Gastrointestinal: Negative for nausea, vomiting, abdominal pain and diarrhea.   Genitourinary: Negative for dysuria and hematuria.   Skin: Negative for rash or concerning moles   Neurological: Negative for dizziness, focal weakness   Psychiatric/Behavioral: Negative for depression.  The patient is not nervous/anxious.    Musculoskeletal: no weakness or joint stiffness    Allergies   Allergen Reactions   • Eggs Anaphylaxis   • Shellfish Allergy Anaphylaxis   • Wheat Bran Anaphylaxis   • Ibuprofen Anaphylaxis   • Tree Nuts Food Allergy Shortness of Breath     Current medicines (including changes today)  Current Outpatient Medications   Medication Sig Dispense Refill   • VYVANSE 30 MG capsule      • MAGNESIUM PO Take  by mouth.     • DIGESTIVE ENZYMES PO Take  by mouth.     • Probiotic Product (PROBIOTIC PO) Take  by mouth.     • liothyronine (CYTOMEL) 5 MCG Tab Take 1 Tab by mouth every day. 90 Tab 3   • levothyroxine (SYNTHROID) 25 MCG Tab Take 1 Tab by mouth Every morning on an empty stomach. 90 Tab 3   • levothyroxine (SYNTHROID) 200 MCG Tab Take 1 Tab by mouth Every morning on an empty stomach. 90 Tab 3   • albuterol 108 (90 BASE) MCG/ACT Aero Soln inhalation aerosol Inhale 2 Puffs by mouth every 6 hours as needed for Shortness of Breath. 8.5 g 1     No current facility-administered medications for this visit.      She  has a past medical history of ADHD, Allergy, Anxiety, Depression, Hashimoto's disease, Hyperthyroidism, and Restless leg syndrome.  She  has a past surgical history that includes mammoplasty augmentation (2011).    Family History   Problem Relation Age of Onset   • Osteoporosis Mother    • No Known Problems Father      Family Status   Relation Name Status   • Mo  Alive   • Fa  Alive     Patient Active Problem List    Diagnosis Date Noted   • Anxiety 12/05/2019   • Acquired hypothyroidism 08/29/2016   • Encounter for initial  "prescription of contraceptive pills 07/13/2016   • Attention deficit hyperactivity disorder 07/13/2016        Objective:   Vitals obtained by patient:  Pulse 84   Ht 1.778 m (5' 10\")   Wt 70.3 kg (155 lb)   LMP 04/04/2020 (Exact Date)   BMI 22.24 kg/m²     Physical Exam:  Constitutional: Alert, no distress, well-groomed.  Skin: No rashes in visible areas.  Eye: Round. Conjunctiva clear, lids normal. No icterus.   ENMT: Lips pink without lesions, good dentition, moist mucous membranes. Phonation normal.  Neck: No masses, no thyromegaly. Moves freely without pain.  CV: Pulse as reported by patient  Respiratory: Unlabored respiratory effort, no cough or audible wheeze  Psych: Alert and oriented x3, normal affect and mood.   Neuro: symmetric face. Alert and oriented. Follows commands. No aphasia or dysarthria.    Labs:  Hospital Outpatient Visit on 06/11/2020   Component Date Value Ref Range Status   • Candida species DNA Probe 06/11/2020 POSITIVE* Negative Final   • Trichamonas vaginalis DNA Probe 06/11/2020 Negative  Negative Final   • Gardnerella vaginalis DNA Probe 06/11/2020 Negative  Negative Final    Comment: Note:  Effective 02-11-14 the Direct Probe Assay for Bacterial Vaginal Pathogens  will be performed at [facility].  A positive result for Candida, Gardnerella and/or Trichomonas means nucleic  acid for Candida species (C. albicans, C. glabrata, C. kefyr, C. krusei,  C. parapsilosis, C. tropicalis), G. vaginalis and/or T. vaginalis,  respectively, is present in the sample and indicates the patient has  candidiasis, bacterial vaginosis, and/or trichomoniasis when consistent with  clinical signs and symptoms. Simultaneous infections by more than one  organism are common.         Imaging:   No results found.    Assessment and Plan:   The following treatment plan was discussed:   Problem List Items Addressed This Visit     Attention deficit hyperactivity disorder     On vyvance. She can " continue with psychiatry. Or provide our office with the official adhd testing records then I can prescribe.          Acquired hypothyroidism     Stable with uptodate labs         Mild intermittent asthma without complication    Relevant Medications    albuterol 108 (90 Base) MCG/ACT Aero Soln inhalation aerosol      Other Visit Diagnoses     Bronchitis        Cough            Follow-up: Return for annual.        Portions of this note may be dictated using [employer] voice recognition software.  Variances in spelling and vocabulary are possible and unintentional.  Not all areas may be caught/corrected.  Please notify me if any discrepancies are noted or if the meaning of any statement is not correct/clear.

## 2020-07-17 ENCOUNTER — OFFICE VISIT (OUTPATIENT)
Dept: URGENT CARE | Facility: CLINIC | Age: 35
End: 2020-07-17
Payer: COMMERCIAL

## 2020-07-17 VITALS
SYSTOLIC BLOOD PRESSURE: 98 MMHG | RESPIRATION RATE: 16 BRPM | BODY MASS INDEX: 21.47 KG/M2 | DIASTOLIC BLOOD PRESSURE: 62 MMHG | TEMPERATURE: 98.4 F | OXYGEN SATURATION: 98 % | HEART RATE: 65 BPM | HEIGHT: 70 IN | WEIGHT: 150 LBS

## 2020-07-17 DIAGNOSIS — B37.31 VAGINAL CANDIDA: ICD-10-CM

## 2020-07-17 LAB
APPEARANCE UR: CLEAR
BILIRUB UR STRIP-MCNC: ABNORMAL MG/DL
COLOR UR AUTO: ABNORMAL
GLUCOSE UR STRIP.AUTO-MCNC: ABNORMAL MG/DL
KETONES UR STRIP.AUTO-MCNC: ABNORMAL MG/DL
LEUKOCYTE ESTERASE UR QL STRIP.AUTO: ABNORMAL
NITRITE UR QL STRIP.AUTO: ABNORMAL
PH UR STRIP.AUTO: 8.5 [PH] (ref 5–8)
PROT UR QL STRIP: ABNORMAL MG/DL
RBC UR QL AUTO: ABNORMAL
SP GR UR STRIP.AUTO: 1.02
UROBILINOGEN UR STRIP-MCNC: 0.2 MG/DL

## 2020-07-17 PROCEDURE — 81002 URINALYSIS NONAUTO W/O SCOPE: CPT | Performed by: NURSE PRACTITIONER

## 2020-07-17 PROCEDURE — 99214 OFFICE O/P EST MOD 30 MIN: CPT | Performed by: NURSE PRACTITIONER

## 2020-07-17 RX ORDER — FLUCONAZOLE 150 MG/1
150 TABLET ORAL DAILY
Qty: 2 TAB | Refills: 0 | Status: SHIPPED
Start: 2020-07-17 | End: 2020-07-24

## 2020-07-17 RX ORDER — ATOMOXETINE 25 MG/1
CAPSULE ORAL
COMMUNITY
Start: 2020-05-25 | End: 2020-08-19

## 2020-07-17 ASSESSMENT — FIBROSIS 4 INDEX: FIB4 SCORE: 0.68

## 2020-07-17 NOTE — PROGRESS NOTES
Chief Complaint   Patient presents with   • Vaginal Pain     x1 week, Monostat has helped, but symptoms have not resolved. Painful intercourse, irritiation.        HISTORY OF PRESENT ILLNESS: Patient is a 35 y.o. female who presents to urgent care today with complaints of one week of vaginal irritation. Denies abnormal discharge, urinary symptoms, or bleeding. She has a history of yeast infections, this feels exactly similar. She was tested and treated for one over one month ago, improved with two courses of Diflucan. Then the patient went camping for one week and believes this along with a poor diet resulted in a return of symptoms. She has tried OTC Monistat this week without improvement. She has an appointment with her OBGYN next week for follow up.     Patient Active Problem List    Diagnosis Date Noted   • Mild intermittent asthma without complication 06/22/2020   • Anxiety 12/05/2019   • Acquired hypothyroidism 08/29/2016   • Encounter for initial prescription of contraceptive pills 07/13/2016   • Attention deficit hyperactivity disorder 07/13/2016       Allergies:Eggs; Shellfish allergy; Wheat bran; Ibuprofen; and Tree nuts food allergy    Current Outpatient Medications Ordered in Epic   Medication Sig Dispense Refill   • fluconazole (DIFLUCAN) 150 MG tablet Take 1 Tab by mouth every day. May repeat dose 72 hours after first dose if still symptomatic. 2 Tab 0   • VYVANSE 30 MG capsule      • albuterol 108 (90 Base) MCG/ACT Aero Soln inhalation aerosol Inhale 2 Puffs by mouth every 6 hours as needed for Shortness of Breath. 8.5 g 1   • MAGNESIUM PO Take  by mouth.     • DIGESTIVE ENZYMES PO Take  by mouth.     • Probiotic Product (PROBIOTIC PO) Take  by mouth.     • liothyronine (CYTOMEL) 5 MCG Tab Take 1 Tab by mouth every day. 90 Tab 3   • levothyroxine (SYNTHROID) 25 MCG Tab Take 1 Tab by mouth Every morning on an empty stomach. 90 Tab 3   • levothyroxine (SYNTHROID) 200 MCG Tab Take 1 Tab by mouth Every  "morning on an empty stomach. 90 Tab 3   • atomoxetine (STRATTERA) 25 MG capsule        No current Epic-ordered facility-administered medications on file.        Past Medical History:   Diagnosis Date   • ADHD    • Allergy    • Anxiety    • Depression    • Hashimoto's disease    • Hyperthyroidism    • Restless leg syndrome        Social History     Tobacco Use   • Smoking status: Never Smoker   • Smokeless tobacco: Never Used   Substance Use Topics   • Alcohol use: Yes     Frequency: Monthly or less   • Drug use: Yes     Types: Marijuana, Oral     Comment:  a few times a month       Family Status   Relation Name Status   • Mo  Alive   • Fa  Alive     Family History   Problem Relation Age of Onset   • Osteoporosis Mother    • No Known Problems Father        ROS:  Review of Systems   Constitutional: Negative for fever, chills, weight loss, malaise, and fatigue.   HENT: Negative for ear pain, nosebleeds, congestion, sore throat and neck pain.    Eyes: Negative for vision changes.   Neuro: Negative for headache, sensory changes, weakness, seizure, LOC.   Cardiovascular: Negative for chest pain, palpitations, orthopnea and leg swelling.   Respiratory: Negative for cough, sputum production, shortness of breath and wheezing.   Gastrointestinal: Negative for abdominal pain, nausea, vomiting or diarrhea.   Genitourinary: Negative for dysuria, urgency and frequency.  GYN: Positive for vaginal pain and irritation.   Musculoskeletal: Negative for falls, neck pain, back pain, joint pain, myalgias.   Skin: Negative for rash, diaphoresis.     Exam:  BP (!) 98/62   Pulse 65   Temp 36.9 °C (98.4 °F) (Temporal)   Resp 16   Ht 1.778 m (5' 10\")   Wt 68 kg (150 lb)   SpO2 98%   General: well-nourished, well-developed female in NAD  Head: normocephalic, atraumatic  Eyes: PERRLA, no conjunctival injection, acuity grossly intact, lids normal.  Ears: normal shape and symmetry, no tenderness, no discharge. External canals are without " any significant edema or erythema. Gross auditory acuity is intact.  Nose: symmetrical without tenderness, no discharge.  Mouth/Throat: reasonable hygiene, no erythema, exudates or tonsillar enlargement.  Neck: no masses, range of motion within normal limits, no tracheal deviation. No obvious thyroid enlargement.   Lymph: no cervical adenopathy. No supraclavicular adenopathy.   Neuro: alert and oriented. Cranial nerves 1-12 grossly intact. No sensory deficit.   Cardiovascular: regular rate and rhythm. No edema.  Pulmonary: no distress. Chest is symmetrical with respiration, no wheezes, crackles, or rhonchi.   Abdomen: soft, non-tender, no guarding, no hepatosplenomegaly.  GYN: exam deferred   Musculoskeletal: no clubbing, appropriate muscle tone, gait is stable.  Skin: warm, dry, intact, no clubbing, no cyanosis, no rashes.   Psych: appropriate mood, affect, judgement.         Assessment/Plan:  1. Vaginal candida  fluconazole (DIFLUCAN) 150 MG tablet       Previous clinic visit encounter reviewed and considered in medical decision making today, pathogens positive for candida. Will treat with addition course of Diflucan. Probiotic use encouraged, increase fluid intake. Follow up with OBGYN as planned.   Supportive care, differential diagnoses, and indications for immediate follow-up discussed with patient.   Pathogenesis of diagnosis discussed including typical length and natural progression.   Instructed to return to clinic or nearest emergency department for any change in condition, further concerns, or worsening of symptoms.  Patient states understanding of the plan of care and discharge instructions.          Please note that this dictation was created using voice recognition software. I have made every reasonable attempt to correct obvious errors, but I expect that there are errors of grammar and possibly content that I did not discover before finalizing the note.      NOE Jacinto.

## 2020-07-22 ENCOUNTER — HOSPITAL ENCOUNTER (OUTPATIENT)
Dept: LAB | Facility: MEDICAL CENTER | Age: 35
End: 2020-07-22
Attending: FAMILY MEDICINE
Payer: COMMERCIAL

## 2020-07-22 ENCOUNTER — OFFICE VISIT (OUTPATIENT)
Dept: MEDICAL GROUP | Facility: IMAGING CENTER | Age: 35
End: 2020-07-22
Payer: COMMERCIAL

## 2020-07-22 VITALS
HEIGHT: 70 IN | DIASTOLIC BLOOD PRESSURE: 56 MMHG | TEMPERATURE: 98.3 F | RESPIRATION RATE: 15 BRPM | OXYGEN SATURATION: 98 % | WEIGHT: 155.6 LBS | BODY MASS INDEX: 22.28 KG/M2 | SYSTOLIC BLOOD PRESSURE: 98 MMHG | HEART RATE: 74 BPM

## 2020-07-22 DIAGNOSIS — Z12.4 CERVICAL CANCER SCREENING: ICD-10-CM

## 2020-07-22 DIAGNOSIS — N89.8 VAGINAL DISCHARGE: ICD-10-CM

## 2020-07-22 PROCEDURE — 87510 GARDNER VAG DNA DIR PROBE: CPT

## 2020-07-22 PROCEDURE — 87624 HPV HI-RISK TYP POOLED RSLT: CPT

## 2020-07-22 PROCEDURE — 88175 CYTOPATH C/V AUTO FLUID REDO: CPT

## 2020-07-22 PROCEDURE — 87480 CANDIDA DNA DIR PROBE: CPT

## 2020-07-22 PROCEDURE — 99395 PREV VISIT EST AGE 18-39: CPT | Performed by: FAMILY MEDICINE

## 2020-07-22 PROCEDURE — 87660 TRICHOMONAS VAGIN DIR PROBE: CPT

## 2020-07-22 SDOH — ECONOMIC STABILITY: HOUSING INSECURITY: IN THE LAST 12 MONTHS, HOW MANY PLACES HAVE YOU LIVED?: 1

## 2020-07-22 SDOH — HEALTH STABILITY: PHYSICAL HEALTH: ON AVERAGE, HOW MANY DAYS PER WEEK DO YOU ENGAGE IN MODERATE TO STRENUOUS EXERCISE (LIKE A BRISK WALK)?: 7 DAYS

## 2020-07-22 SDOH — HEALTH STABILITY: MENTAL HEALTH: HOW OFTEN DO YOU HAVE 6 OR MORE DRINKS ON ONE OCCASION?: NEVER

## 2020-07-22 SDOH — ECONOMIC STABILITY: HOUSING INSECURITY
IN THE LAST 12 MONTHS, WAS THERE A TIME WHEN YOU DID NOT HAVE A STEADY PLACE TO SLEEP OR SLEPT IN A SHELTER (INCLUDING NOW)?: NO

## 2020-07-22 SDOH — HEALTH STABILITY: MENTAL HEALTH: HOW MANY STANDARD DRINKS CONTAINING ALCOHOL DO YOU HAVE ON A TYPICAL DAY?: 1 OR 2

## 2020-07-22 SDOH — ECONOMIC STABILITY: INCOME INSECURITY: IN THE LAST 12 MONTHS, WAS THERE A TIME WHEN YOU WERE NOT ABLE TO PAY THE MORTGAGE OR RENT ON TIME?: NO

## 2020-07-22 SDOH — HEALTH STABILITY: MENTAL HEALTH: HOW OFTEN DO YOU HAVE A DRINK CONTAINING ALCOHOL?: 2-4 TIMES A MONTH

## 2020-07-22 SDOH — ECONOMIC STABILITY: FOOD INSECURITY: WITHIN THE PAST 12 MONTHS, THE FOOD YOU BOUGHT JUST DIDN'T LAST AND YOU DIDN'T HAVE MONEY TO GET MORE.: NEVER TRUE

## 2020-07-22 SDOH — ECONOMIC STABILITY: INCOME INSECURITY: HOW HARD IS IT FOR YOU TO PAY FOR THE VERY BASICS LIKE FOOD, HOUSING, MEDICAL CARE, AND HEATING?: NOT HARD AT ALL

## 2020-07-22 SDOH — ECONOMIC STABILITY: FOOD INSECURITY: WITHIN THE PAST 12 MONTHS, YOU WORRIED THAT YOUR FOOD WOULD RUN OUT BEFORE YOU GOT MONEY TO BUY MORE.: NEVER TRUE

## 2020-07-22 SDOH — SOCIAL STABILITY: SOCIAL NETWORK: IN A TYPICAL WEEK, HOW MANY TIMES DO YOU TALK ON THE PHONE WITH FAMILY, FRIENDS, OR NEIGHBORS?: THREE TIMES A WEEK

## 2020-07-22 SDOH — SOCIAL STABILITY: SOCIAL NETWORK: ARE YOU MARRIED, WIDOWED, DIVORCED, SEPARATED, NEVER MARRIED, OR LIVING WITH A PARTNER?: MARRIED

## 2020-07-22 SDOH — HEALTH STABILITY: PHYSICAL HEALTH: ON AVERAGE, HOW MANY MINUTES DO YOU ENGAGE IN EXERCISE AT THIS LEVEL?: 50 MIN

## 2020-07-22 SDOH — HEALTH STABILITY: PHYSICAL HEALTH: ON AVERAGE, HOW MANY MINUTES DO YOU ENGAGE IN EXERCISE AT THIS LEVEL?: 50 MINUTES

## 2020-07-22 SDOH — HEALTH STABILITY: MENTAL HEALTH
STRESS IS WHEN SOMEONE FEELS TENSE, NERVOUS, ANXIOUS, OR CAN'T SLEEP AT NIGHT BECAUSE THEIR MIND IS TROUBLED. HOW STRESSED ARE YOU?: ONLY A LITTLE

## 2020-07-22 SDOH — SOCIAL STABILITY: SOCIAL NETWORK
DO YOU BELONG TO ANY CLUBS OR ORGANIZATIONS SUCH AS CHURCH GROUPS UNIONS, FRATERNAL OR ATHLETIC GROUPS, OR SCHOOL GROUPS?: NO

## 2020-07-22 SDOH — ECONOMIC STABILITY: TRANSPORTATION INSECURITY
IN THE PAST 12 MONTHS, HAS THE LACK OF TRANSPORTATION KEPT YOU FROM MEDICAL APPOINTMENTS OR FROM GETTING MEDICATIONS?: NO

## 2020-07-22 SDOH — SOCIAL STABILITY: SOCIAL NETWORK: HOW OFTEN DO YOU GET TOGETHER WITH FRIENDS OR RELATIVES?: ONCE A WEEK

## 2020-07-22 SDOH — ECONOMIC STABILITY: TRANSPORTATION INSECURITY
IN THE PAST 12 MONTHS, HAS LACK OF TRANSPORTATION KEPT YOU FROM MEETINGS, WORK, OR FROM GETTING THINGS NEEDED FOR DAILY LIVING?: NO

## 2020-07-22 SDOH — SOCIAL STABILITY: SOCIAL NETWORK: HOW OFTEN DO YOU ATTENT MEETINGS OF THE CLUB OR ORGANIZATION YOU BELONG TO?: NEVER

## 2020-07-22 SDOH — SOCIAL STABILITY: SOCIAL NETWORK: HOW OFTEN DO YOU ATTEND CHURCH OR RELIGIOUS SERVICES?: NEVER

## 2020-07-22 SDOH — ECONOMIC STABILITY: TRANSPORTATION INSECURITY
IN THE PAST 12 MONTHS, HAS LACK OF RELIABLE TRANSPORTATION KEPT YOU FROM MEDICAL APPOINTMENTS, MEETINGS, WORK OR FROM GETTING THINGS NEEDED FOR DAILY LIVING?: NO

## 2020-07-22 ASSESSMENT — SOCIAL DETERMINANTS OF HEALTH (SDOH)
HOW OFTEN DO YOU ATTEND CHURCH OR RELIGIOUS SERVICES?: NEVER
HOW OFTEN DO YOU HAVE SIX OR MORE DRINKS ON ONE OCCASION: NEVER
HOW MANY DRINKS CONTAINING ALCOHOL DO YOU HAVE ON A TYPICAL DAY WHEN YOU ARE DRINKING: 1 OR 2
WITHIN THE PAST 12 MONTHS, YOU WORRIED THAT YOUR FOOD WOULD RUN OUT BEFORE YOU GOT THE MONEY TO BUY MORE: NEVER TRUE
IN A TYPICAL WEEK, HOW MANY TIMES DO YOU TALK ON THE PHONE WITH FAMILY, FRIENDS, OR NEIGHBORS?: THREE TIMES A WEEK
DO YOU BELONG TO ANY CLUBS OR ORGANIZATIONS SUCH AS CHURCH GROUPS UNIONS, FRATERNAL OR ATHLETIC GROUPS, OR SCHOOL GROUPS?: NO
HOW OFTEN DO YOU GET TOGETHER WITH FRIENDS OR RELATIVES?: ONCE A WEEK
HOW OFTEN DO YOU HAVE A DRINK CONTAINING ALCOHOL: 2-4 TIMES A MONTH
HOW HARD IS IT FOR YOU TO PAY FOR THE VERY BASICS LIKE FOOD, HOUSING, MEDICAL CARE, AND HEATING?: NOT HARD AT ALL
HOW OFTEN DO YOU ATTENT MEETINGS OF THE CLUB OR ORGANIZATION YOU BELONG TO?: NEVER
WITHIN THE PAST 12 MONTHS, THE FOOD YOU BOUGHT JUST DIDN'T LAST AND YOU DIDN'T HAVE MONEY TO GET MORE: NEVER TRUE

## 2020-07-22 ASSESSMENT — PAIN SCALES - GENERAL: PAINLEVEL: NO PAIN

## 2020-07-22 ASSESSMENT — FIBROSIS 4 INDEX: FIB4 SCORE: 0.68

## 2020-07-22 NOTE — PROGRESS NOTES
Subjective:     CC:   Chief Complaint   Patient presents with   • Gynecologic Exam     7/2018   • Vaginitis     2nd yeast infection in 2 months. on 2nd pill of diflucan       HPI:   Juanita Montero is a 35 y.o. female who presents for annual exam. She is feeling well and denies any complaints. June 11th vaginal pathogens candida.      Ob-Gyn/ History:    Patient has GYN provider: no  Last Pap Smear:  Patient reports she is due now  Gyn Surgery:  none.  Current Contraceptive Method: has iud last one mid hunter then had a spot a few days ago periods like this since iud placement  No vaginal discharge  Interested in STI screening less than 23yo or at risk behavior: no  Safe in relationship.     Health Maintenance  Diet: healthy  Exercise: active  Substance Abuse: none  Seat belts, bike helmet, gun safety discussed.  Sun protection used.    Cancer screening  Colorectal Cancer Screening: no family history  Lung Cancer Screening: never smoked  Cervical Cancer Screening: will do today  Breast Cancer Screening: two great aunts with breast cancer    Infectious disease screening/Immunizations  --STI Screening: None  --Practices safe sex.  --HIV Screening: Has been tested and negative in the past  --Immunizations:    Will need to discuss pneumococcal with patient separately.    She  has a past medical history of ADHD, Allergy, Anxiety, Depression, Hashimoto's disease, Hyperthyroidism, and Restless leg syndrome.  She  has a past surgical history that includes mammoplasty augmentation (2011).    Family History   Problem Relation Age of Onset   • Osteoporosis Mother    • No Known Problems Father        Social History     Socioeconomic History   • Marital status:      Spouse name: Not on file   • Number of children: Not on file   • Years of education: Not on file   • Highest education level: Some college, no degree   Occupational History   • Not on file   Social Needs   • Financial resource strain: Not hard at all    • Food insecurity     Worry: Never true     Inability: Never true   • Transportation needs     Medical: No     Non-medical: No   Tobacco Use   • Smoking status: Never Smoker   • Smokeless tobacco: Never Used   Substance and Sexual Activity   • Alcohol use: Yes     Frequency: 2-4 times a month     Drinks per session: 1 or 2     Binge frequency: Never   • Drug use: Yes     Types: Marijuana, Oral     Comment:  a few times a month   • Sexual activity: Yes   Lifestyle   • Physical activity     Days per week: 7 days     Minutes per session: 50 min   • Stress: Only a little   Relationships   • Social connections     Talks on phone: Three times a week     Gets together: Once a week     Attends Buddhism service: Never     Active member of club or organization: No     Attends meetings of clubs or organizations: Never     Relationship status:    • Intimate partner violence     Fear of current or ex partner: Not on file     Emotionally abused: Not on file     Physically abused: Not on file     Forced sexual activity: Not on file   Other Topics Concern   • Not on file   Social History Narrative   • Not on file       Patient Active Problem List    Diagnosis Date Noted   • Mild intermittent asthma without complication 06/22/2020   • Anxiety 12/05/2019   • Acquired hypothyroidism 08/29/2016   • Encounter for initial prescription of contraceptive pills 07/13/2016   • Attention deficit hyperactivity disorder 07/13/2016         Current Outpatient Medications   Medication Sig Dispense Refill   • fluconazole (DIFLUCAN) 150 MG tablet Take 1 Tab by mouth every day. May repeat dose 72 hours after first dose if still symptomatic. 2 Tab 0   • VYVANSE 30 MG capsule      • albuterol 108 (90 Base) MCG/ACT Aero Soln inhalation aerosol Inhale 2 Puffs by mouth every 6 hours as needed for Shortness of Breath. 8.5 g 1   • MAGNESIUM PO Take  by mouth.     • DIGESTIVE ENZYMES PO Take  by mouth.     • Probiotic Product (PROBIOTIC PO) Take  by  "mouth.     • liothyronine (CYTOMEL) 5 MCG Tab Take 1 Tab by mouth every day. 90 Tab 3   • levothyroxine (SYNTHROID) 25 MCG Tab Take 1 Tab by mouth Every morning on an empty stomach. 90 Tab 3   • levothyroxine (SYNTHROID) 200 MCG Tab Take 1 Tab by mouth Every morning on an empty stomach. 90 Tab 3   • atomoxetine (STRATTERA) 25 MG capsule        No current facility-administered medications for this visit.      Allergies   Allergen Reactions   • Eggs Anaphylaxis   • Shellfish Allergy Anaphylaxis   • Wheat Bran Anaphylaxis   • Ibuprofen Anaphylaxis   • Tree Nuts Food Allergy Shortness of Breath       Review of Systems  Constitutional: Negative for fever, chills, unexplained weight loss, night sweats  HENT: Negative for congestion.    Eyes: Negative for pain or sudden vision changes   Respiratory: Negative for cough and shortness of breath.    Cardiovascular: Negative for leg swelling or chest pain  Gastrointestinal: Negative for nausea, vomiting, abdominal pain and diarrhea.   Genitourinary: Negative for dysuria and hematuria.   Skin: Negative for rash or concerning moles   Neurological: Negative for dizziness, focal weakness and headaches.   Psychiatric/Behavioral: Negative for depression.  The patient is not nervous/anxious.      Objective:     BP (!) 98/56 (BP Location: Right arm, Patient Position: Sitting, BP Cuff Size: Adult)   Pulse 74   Temp 36.8 °C (98.3 °F) (Temporal)   Resp 15   Ht 1.778 m (5' 10\")   Wt 70.6 kg (155 lb 9.6 oz)   LMP 06/15/2020   SpO2 98%   BMI 22.33 kg/m²   Body mass index is 22.33 kg/m².  Wt Readings from Last 4 Encounters:   07/22/20 70.6 kg (155 lb 9.6 oz)   07/17/20 68 kg (150 lb)   06/22/20 70.3 kg (155 lb)   06/11/20 70.3 kg (155 lb)       Physical Exam:  Constitutional: Well-developed and well-nourished. Not diaphoretic. No distress.   Skin: Skin is warm and dry. No rash noted.  Head: Atraumatic without lesions.  Eyes: Conjunctivae and extraocular motions are normal. Pupils are " equal, round, and reactive to light and accomodation. No scleral icterus.   Ears:  External ears unremarkable b/l. Tympanic membranes clear and intact b/l  Nose: Nares patent. Septum midline. Turbinates without erythema nor edema. No discharge.   Mouth/Throat: Dentition is good. Tongue normal. Oropharynx is clear with out lesions and moist. Posterior pharynx without erythema or exudates.  Neck: Supple, trachea midline. Normal range of motion. No thyromegaly present. No lymphadenopathy--cervical or supraclavicular.  Cardiovascular: Regular rate and rhythm, S1 and S2 without murmur, rubs, or gallops.  Lungs: Normal inspiratory effort, CTA bilaterally, no wheezes/rhonchi/rales  Breast: Patient politely defers  Abdomen: Soft, non tender, and without distention. Active normal bowel sounds. No rebound, guarding, masses or HSM.  :Perineum and external genitalia normal without rash. Vagina with white and odorless discharge. Cervix without visible lesions or discharge. pelvic exam without adnexal masses or cervical motion tenderness.  Extremities: No cyanosis, clubbing, erythema, nor edema. Distal pulses intact and symmetric.   Musculoskeletal: All major joints AROM full in all directions without pain.  Neurological: Alert and oriented x 3.  No cranial nerve deficit.   Psychiatric:  Behavior, mood, and affect are appropriate.    A chaperone was offered to the patient during today's exam. Chaperone name: Elva Conn was present.    Assessment and Plan:   Cbc and cmp normal 2/2020 tsh normal then too as well as vit d  No problems updated.  Problem List Items Addressed This Visit     None      Visit Diagnoses     Cervical cancer screening        Vaginal discharge          Her labs were already done with another specialist.  Reviewed and all look normal.    Labs per orders fasting no coffee or espresso   Immunizations per orders  Patient counseled about skin care, diet, supplements, prenatal vitamins, safe sex and  exercise.    Follow-up: PRN concerns and for annual screenings once per year    -Smoking cessation discussed if smoking and encouraged to come to office if quit and tempted or restarts smoking if pertinent to this patient. We discourage use of electronic smoking devises.   -Discussed healthy drinking habits if over 7 for females, 14 for males per week or more than 4 in one day.  -Discussed healthy eating habits, exercise, being physically active, healthy bmi below 25  -patient to provide ages of family members when they were diagnosed with cancer , especially breast and ovarian, pancreatic cancers.  -Reviewed pap history in female patients, if they have a gynecologist they are encouraged to follow up with that doctor for annual pelvic and breast exams. If they prefer to see me for women's health, I will perform pap smear with hpv dna testing, pelvic, and breast exam, these and male genital exams are always done in the presence of a medical assistant and with verbal permission from the patient.   -Colonoscopy history reviewed with those over 46yo or those with early family h/o colon cancer. If patient is due we provide them with various colon cancer screen modalities and relevant information to help the patient decide which is best for them.   -Mammograms recommended yearly for women over 39yo. Risks and benefits are reviewed and discussed with the patient and mammogram script provided.   -PSA discussed with males with family history of prostate cancer or those concerned. The decision to order this test is made with the patient.   -If patient prescribed medicines then told to review package insert for any warnings, side effects, contra-indications and medication vs medication reactions.   -STD testing added to lab work due to patients age per guideline recommendations  -Patients screened for anxiety and depression. If positive screening patients are offered behavioral health services, medications, and tools to  improve mood.   -to improve bone health take calcium and vitamin D, perform weight-bearing exercise, in addition we can discuss additional medications if needed including bisphosphonates, parathyroid hormone, and raloxifene.  Esophageal irritation can occur with bisphosphonate therapy this can be reduced by not laying down for 30 min after taking and taking with a full glass of water  -if wearing nail polish on toes or hands asked to rto if there are any dark brown or black areas under the nails  If lab tests ordered, then patient instructed to go to lab/location/plan  If imaging tests ordered, then patient instructed to go to radiology/location/plan  If medicines ordered, then patient instructed to go to pharmacy/location/plan  Health maintenance I reviewed both men and women's health maintenance  Leading causes of death are motor vehicle accidents, cardiovascular disease, malignant tumors, and HIV.   Breast and ovarian cancer mutation screening was suggested if there was an increased risk for the patient based on Tangirnaq scoring.   -A general visit to see the eye doctor every one to two years was thought appropriate. Dentist ever 6-12 months.  -Immunization suggestions: Tetanus shot every 10 years, Influenza immunization pneumococcal- anyone with chronic illnesses

## 2020-07-24 DIAGNOSIS — N76.0 ACUTE VAGINITIS: ICD-10-CM

## 2020-07-24 RX ORDER — FLUCONAZOLE 150 MG/1
150 TABLET ORAL ONCE
Qty: 2 TAB | Refills: 0 | Status: SHIPPED | OUTPATIENT
Start: 2020-07-24 | End: 2020-07-24

## 2020-07-26 LAB
CYTOLOGY REG CYTOL: ABNORMAL
HPV HR 12 DNA CVX QL NAA+PROBE: POSITIVE
HPV16 DNA SPEC QL NAA+PROBE: NEGATIVE
HPV18 DNA SPEC QL NAA+PROBE: NEGATIVE
SPECIMEN SOURCE: ABNORMAL

## 2020-08-19 ENCOUNTER — OFFICE VISIT (OUTPATIENT)
Dept: MEDICAL GROUP | Facility: IMAGING CENTER | Age: 35
End: 2020-08-19
Payer: COMMERCIAL

## 2020-08-19 ENCOUNTER — HOSPITAL ENCOUNTER (OUTPATIENT)
Facility: MEDICAL CENTER | Age: 35
End: 2020-08-19
Attending: FAMILY MEDICINE
Payer: COMMERCIAL

## 2020-08-19 VITALS
SYSTOLIC BLOOD PRESSURE: 104 MMHG | WEIGHT: 156.2 LBS | DIASTOLIC BLOOD PRESSURE: 70 MMHG | HEIGHT: 70 IN | OXYGEN SATURATION: 94 % | HEART RATE: 62 BPM | BODY MASS INDEX: 22.36 KG/M2 | RESPIRATION RATE: 14 BRPM | TEMPERATURE: 99 F

## 2020-08-19 DIAGNOSIS — B96.89 BV (BACTERIAL VAGINOSIS): ICD-10-CM

## 2020-08-19 DIAGNOSIS — N76.0 BV (BACTERIAL VAGINOSIS): ICD-10-CM

## 2020-08-19 PROCEDURE — 87510 GARDNER VAG DNA DIR PROBE: CPT

## 2020-08-19 PROCEDURE — 87660 TRICHOMONAS VAGIN DIR PROBE: CPT

## 2020-08-19 PROCEDURE — 87480 CANDIDA DNA DIR PROBE: CPT

## 2020-08-19 PROCEDURE — 99213 OFFICE O/P EST LOW 20 MIN: CPT | Performed by: FAMILY MEDICINE

## 2020-08-19 RX ORDER — METRONIDAZOLE 7.5 MG/G
1 GEL VAGINAL
Qty: 5 EACH | Refills: 0 | Status: SHIPPED | OUTPATIENT
Start: 2020-08-19 | End: 2020-08-24

## 2020-08-19 ASSESSMENT — ENCOUNTER SYMPTOMS
FEVER: 0
WHEEZING: 0
SHORTNESS OF BREATH: 0
PALPITATIONS: 0
NAUSEA: 0
MYALGIAS: 0
HEADACHES: 0
DIZZINESS: 0
DIARRHEA: 0
VOMITING: 0
EYE PAIN: 0
COUGH: 0
ABDOMINAL PAIN: 0
DOUBLE VISION: 0
CHILLS: 0

## 2020-08-19 ASSESSMENT — FIBROSIS 4 INDEX: FIB4 SCORE: 0.68

## 2020-08-19 NOTE — PROGRESS NOTES
Chief Complaint   Patient presents with   • Vaginal Discharge     smell, noticed last night      HPI:  35-year-old female with recent history of recurrent yeast infections that do respond to a few doses of Diflucan.  Also now having fishy odor especially after intercourse for 2 days now.  Patient reports that she has a history of having a tampon lodged near her cervix for 6 weeks or more for this reason she would like a pelvic exam today.  She feels this may be a cause of her recurrent infections versus her partner.  No itching or pelvic pain.  He also has an IUD and does wonder if this is the cause of her recurrent infections.   Allergies   Allergen Reactions   • Eggs Anaphylaxis   • Shellfish Allergy Anaphylaxis   • Wheat Bran Anaphylaxis   • Ibuprofen Anaphylaxis   • Tree Nuts Food Allergy Shortness of Breath     Current Outpatient Medications   Medication Sig Dispense Refill   • atomoxetine (STRATTERA) 25 MG capsule      • VYVANSE 30 MG capsule      • albuterol 108 (90 Base) MCG/ACT Aero Soln inhalation aerosol Inhale 2 Puffs by mouth every 6 hours as needed for Shortness of Breath. 8.5 g 1   • MAGNESIUM PO Take  by mouth.     • DIGESTIVE ENZYMES PO Take  by mouth.     • Probiotic Product (PROBIOTIC PO) Take  by mouth.     • liothyronine (CYTOMEL) 5 MCG Tab Take 1 Tab by mouth every day. 90 Tab 3   • levothyroxine (SYNTHROID) 25 MCG Tab Take 1 Tab by mouth Every morning on an empty stomach. 90 Tab 3   • levothyroxine (SYNTHROID) 200 MCG Tab Take 1 Tab by mouth Every morning on an empty stomach. 90 Tab 3     No current facility-administered medications for this visit.      Social History     Tobacco Use   • Smoking status: Never Smoker   • Smokeless tobacco: Never Used   Substance Use Topics   • Alcohol use: Yes     Frequency: 2-4 times a month     Drinks per session: 1 or 2     Binge frequency: Never   • Drug use: Yes     Types: Marijuana, Oral     Comment:  a few times a month     Family History   Problem  "Relation Age of Onset   • Osteoporosis Mother    • No Known Problems Father        Ht 1.778 m (5' 10\")   Wt 70.9 kg (156 lb 3.2 oz)  Body mass index is 22.41 kg/m².  Review of Systems   Constitutional: Negative for chills, fever and malaise/fatigue.   HENT: Negative for hearing loss and tinnitus.    Eyes: Negative for double vision and pain.   Respiratory: Negative for cough, shortness of breath and wheezing.    Cardiovascular: Negative for chest pain, palpitations and leg swelling.   Gastrointestinal: Negative for abdominal pain, diarrhea, nausea and vomiting.   Genitourinary: Negative for dysuria and frequency.   Musculoskeletal: Negative for joint pain and myalgias.   Skin: Negative for itching and rash.   Neurological: Negative for dizziness and headaches.     Physical Exam   Constitutional: She is well-developed, well-nourished, and in no distress. No distress.   HENT:   Head: Normocephalic and atraumatic.   Eyes: Pupils are equal, round, and reactive to light. Conjunctivae and EOM are normal. Right eye exhibits no discharge. Left eye exhibits no discharge. No scleral icterus.   Neck: No thyromegaly present.   Pulmonary/Chest: Effort normal. No respiratory distress.   Abdominal: She exhibits no distension.   Genitourinary:    Genitourinary Comments: External vagina and rectum within normal limits vaginal mucosa within normal limits cervix within normal limits no foreign objects very little discharge given that she had a tampon in all day     Musculoskeletal:         General: No edema.   Neurological: She is alert.   Skin: Skin is warm and dry. She is not diaphoretic.   Psychiatric: Affect and judgment normal.       All labs (last 1 month):   Hospital Outpatient Visit on 07/22/2020   Component Date Value Ref Range Status   • Cytology Reg 07/22/2020 See Path Report   Final    Comment: A separate pathology report will follow after the Cytology  specimen has been received by the laboratory and the results  are " "signed out by a pathologist.  Please see \"Pathology GYN Specimen\" order for these results.     • Source 07/22/2020 Endo/Cervical   Final   • HPV Genotype 16 07/22/2020 Negative  Negative Final   • HPV Genotype 18 07/22/2020 Negative  Negative Final   • HPV Other High Risk Genotypes 07/22/2020 POSITIVE* Negative Final    Comment: This test detects the high-risk HPV types 16, 18, 31, 33,  35, 39, 45, 51, 52, 56, 58, 59, 66, and 68. It is intended  for use in women 21 years and older with ASC-US cervical  cytology results and in women 30 years and older with  positive high-risk HPV results. Sensitivity may be affected  by specimen collection methods, stage of infection, and the  presence of interfering substances. Results should be  interpreted in conjunction with other available laboratory  and clinical data.     • Candida species DNA Probe 07/22/2020 Negative  Negative Final   • Trichamonas vaginalis DNA Probe 07/22/2020 Negative  Negative Final   • Gardnerella vaginalis DNA Probe 07/22/2020 Negative  Negative Final    Comment: Note:  Effective 02-11-14 the Direct Probe Assay for Bacterial Vaginal Pathogens  will be performed at [facility].  A positive result for Candida, Gardnerella and/or Trichomonas means nucleic  acid for Candida species (C. albicans, C. glabrata, C. kefyr, C. krusei,  C. parapsilosis, C. tropicalis), G. vaginalis and/or T. vaginalis,  respectively, is present in the sample and indicates the patient has  candidiasis, bacterial vaginosis, and/or trichomoniasis when consistent with  clinical signs and symptoms. Simultaneous infections by more than one  organism are common.         Lipids:   Lab Results   Component Value Date/Time    CHOLSTRLTOT 149 02/07/2020 06:12 AM    TRIGLYCERIDE 64 02/07/2020 06:12 AM    HDL 61 02/07/2020 06:12 AM    LDL 75 02/07/2020 06:12 AM       Imaging: No results found.    A/P  Discussed increased risk of candidal infection with IUD though no " determination whether or not IUD increases risk of BV  Return if symptoms worsen or fail to improve.    Problem List Items Addressed This Visit     None      Visit Diagnoses     BV (bacterial vaginosis)        Relevant Medications    metroNIDAZOLE (METROGEL-VAGINAL) 0.75 % Gel    Other Relevant Orders    VAGINAL PATHOGENS DNA PANEL (Completed)          Portions of this note may be dictated using Dragon NaturallySpeaking voice recognition software.  Variances in spelling and vocabulary are possible and unintentional.  Not all areas may be caught/corrected.  Please notify me if any discrepancies are noted or if the meaning of any statement is not correct/clear.

## 2020-08-25 DIAGNOSIS — Z30.431 IUD CHECK UP: ICD-10-CM

## 2020-11-10 ENCOUNTER — APPOINTMENT (OUTPATIENT)
Dept: RADIOLOGY | Facility: MEDICAL CENTER | Age: 35
End: 2020-11-10
Attending: PSYCHIATRY & NEUROLOGY
Payer: COMMERCIAL

## 2020-11-10 ENCOUNTER — HOSPITAL ENCOUNTER (EMERGENCY)
Facility: MEDICAL CENTER | Age: 35
End: 2020-11-10
Attending: EMERGENCY MEDICINE
Payer: COMMERCIAL

## 2020-11-10 VITALS
SYSTOLIC BLOOD PRESSURE: 108 MMHG | HEART RATE: 56 BPM | OXYGEN SATURATION: 97 % | RESPIRATION RATE: 15 BRPM | TEMPERATURE: 98 F | HEIGHT: 70 IN | DIASTOLIC BLOOD PRESSURE: 71 MMHG | BODY MASS INDEX: 22.31 KG/M2 | WEIGHT: 155.87 LBS

## 2020-11-10 DIAGNOSIS — Z86.59 HISTORY OF ANXIETY: ICD-10-CM

## 2020-11-10 DIAGNOSIS — R55 SYNCOPE AND COLLAPSE: ICD-10-CM

## 2020-11-10 DIAGNOSIS — E86.0 DEHYDRATION: ICD-10-CM

## 2020-11-10 DIAGNOSIS — E03.9 ACQUIRED HYPOTHYROIDISM: ICD-10-CM

## 2020-11-10 DIAGNOSIS — F90.9 ATTENTION DEFICIT HYPERACTIVITY DISORDER (ADHD), UNSPECIFIED ADHD TYPE: ICD-10-CM

## 2020-11-10 DIAGNOSIS — S06.0X1A CONCUSSION WITH LOSS OF CONSCIOUSNESS OF 30 MINUTES OR LESS, INITIAL ENCOUNTER: ICD-10-CM

## 2020-11-10 LAB
ALBUMIN SERPL BCP-MCNC: 3.8 G/DL (ref 3.2–4.9)
ALBUMIN/GLOB SERPL: 1.3 G/DL
ALP SERPL-CCNC: 77 U/L (ref 30–99)
ALT SERPL-CCNC: 25 U/L (ref 2–50)
ANION GAP SERPL CALC-SCNC: 10 MMOL/L (ref 7–16)
AST SERPL-CCNC: 32 U/L (ref 12–45)
BASOPHILS # BLD AUTO: 0.7 % (ref 0–1.8)
BASOPHILS # BLD: 0.06 K/UL (ref 0–0.12)
BILIRUB SERPL-MCNC: 0.3 MG/DL (ref 0.1–1.5)
BUN SERPL-MCNC: 25 MG/DL (ref 8–22)
CALCIUM SERPL-MCNC: 8.9 MG/DL (ref 8.4–10.2)
CHLORIDE SERPL-SCNC: 105 MMOL/L (ref 96–112)
CO2 SERPL-SCNC: 25 MMOL/L (ref 20–33)
CREAT SERPL-MCNC: 1.04 MG/DL (ref 0.5–1.4)
EOSINOPHIL # BLD AUTO: 0.11 K/UL (ref 0–0.51)
EOSINOPHIL NFR BLD: 1.2 % (ref 0–6.9)
ERYTHROCYTE [DISTWIDTH] IN BLOOD BY AUTOMATED COUNT: 40.4 FL (ref 35.9–50)
GLOBULIN SER CALC-MCNC: 3 G/DL (ref 1.9–3.5)
GLUCOSE SERPL-MCNC: 76 MG/DL (ref 65–99)
HCG SERPL QL: NEGATIVE
HCT VFR BLD AUTO: 43.4 % (ref 37–47)
HGB BLD-MCNC: 14.7 G/DL (ref 12–16)
IMM GRANULOCYTES # BLD AUTO: 0.01 K/UL (ref 0–0.11)
IMM GRANULOCYTES NFR BLD AUTO: 0.1 % (ref 0–0.9)
LYMPHOCYTES # BLD AUTO: 2.61 K/UL (ref 1–4.8)
LYMPHOCYTES NFR BLD: 29.1 % (ref 22–41)
MAGNESIUM SERPL-MCNC: 2.2 MG/DL (ref 1.5–2.5)
MCH RBC QN AUTO: 30.8 PG (ref 27–33)
MCHC RBC AUTO-ENTMCNC: 33.9 G/DL (ref 33.6–35)
MCV RBC AUTO: 91 FL (ref 81.4–97.8)
MONOCYTES # BLD AUTO: 0.49 K/UL (ref 0–0.85)
MONOCYTES NFR BLD AUTO: 5.5 % (ref 0–13.4)
NEUTROPHILS # BLD AUTO: 5.69 K/UL (ref 2–7.15)
NEUTROPHILS NFR BLD: 63.4 % (ref 44–72)
NRBC # BLD AUTO: 0 K/UL
NRBC BLD-RTO: 0 /100 WBC
PLATELET # BLD AUTO: 273 K/UL (ref 164–446)
PMV BLD AUTO: 10 FL (ref 9–12.9)
POTASSIUM SERPL-SCNC: 4.2 MMOL/L (ref 3.6–5.5)
PROT SERPL-MCNC: 6.8 G/DL (ref 6–8.2)
RBC # BLD AUTO: 4.77 M/UL (ref 4.2–5.4)
SODIUM SERPL-SCNC: 140 MMOL/L (ref 135–145)
TSH SERPL DL<=0.005 MIU/L-ACNC: 3.17 UIU/ML (ref 0.38–5.33)
VIT B12 SERPL-MCNC: 601 PG/ML (ref 211–911)
WBC # BLD AUTO: 9 K/UL (ref 4.8–10.8)

## 2020-11-10 PROCEDURE — 70450 CT HEAD/BRAIN W/O DYE: CPT

## 2020-11-10 PROCEDURE — 36415 COLL VENOUS BLD VENIPUNCTURE: CPT

## 2020-11-10 PROCEDURE — 99284 EMERGENCY DEPT VISIT MOD MDM: CPT | Performed by: PSYCHIATRY & NEUROLOGY

## 2020-11-10 PROCEDURE — 84425 ASSAY OF VITAMIN B-1: CPT

## 2020-11-10 PROCEDURE — 99285 EMERGENCY DEPT VISIT HI MDM: CPT

## 2020-11-10 PROCEDURE — 700105 HCHG RX REV CODE 258: Performed by: EMERGENCY MEDICINE

## 2020-11-10 PROCEDURE — 82607 VITAMIN B-12: CPT

## 2020-11-10 PROCEDURE — 93005 ELECTROCARDIOGRAM TRACING: CPT | Performed by: PSYCHIATRY & NEUROLOGY

## 2020-11-10 PROCEDURE — 84443 ASSAY THYROID STIM HORMONE: CPT

## 2020-11-10 PROCEDURE — 70486 CT MAXILLOFACIAL W/O DYE: CPT

## 2020-11-10 PROCEDURE — 96365 THER/PROPH/DIAG IV INF INIT: CPT

## 2020-11-10 PROCEDURE — 85025 COMPLETE CBC W/AUTO DIFF WBC: CPT

## 2020-11-10 PROCEDURE — 84703 CHORIONIC GONADOTROPIN ASSAY: CPT

## 2020-11-10 PROCEDURE — 72125 CT NECK SPINE W/O DYE: CPT

## 2020-11-10 PROCEDURE — 80053 COMPREHEN METABOLIC PANEL: CPT

## 2020-11-10 PROCEDURE — 700111 HCHG RX REV CODE 636 W/ 250 OVERRIDE (IP): Performed by: PSYCHIATRY & NEUROLOGY

## 2020-11-10 PROCEDURE — 83735 ASSAY OF MAGNESIUM: CPT

## 2020-11-10 RX ORDER — MAGNESIUM SULFATE HEPTAHYDRATE 40 MG/ML
2 INJECTION, SOLUTION INTRAVENOUS ONCE
Status: DISCONTINUED | OUTPATIENT
Start: 2020-11-10 | End: 2020-11-10

## 2020-11-10 RX ORDER — SODIUM CHLORIDE, SODIUM LACTATE, POTASSIUM CHLORIDE, CALCIUM CHLORIDE 600; 310; 30; 20 MG/100ML; MG/100ML; MG/100ML; MG/100ML
1000 INJECTION, SOLUTION INTRAVENOUS ONCE
Status: COMPLETED | OUTPATIENT
Start: 2020-11-10 | End: 2020-11-10

## 2020-11-10 RX ORDER — LANCETS 30 GAUGE
EACH MISCELLANEOUS
Qty: 100 EACH | Refills: 0 | Status: SHIPPED | OUTPATIENT
Start: 2020-11-10

## 2020-11-10 RX ORDER — METOCLOPRAMIDE HYDROCHLORIDE 5 MG/ML
10 INJECTION INTRAMUSCULAR; INTRAVENOUS ONCE
Status: DISCONTINUED | OUTPATIENT
Start: 2020-11-10 | End: 2020-11-10

## 2020-11-10 RX ORDER — DIPHENHYDRAMINE HYDROCHLORIDE 50 MG/ML
25 INJECTION INTRAMUSCULAR; INTRAVENOUS ONCE
Status: DISCONTINUED | OUTPATIENT
Start: 2020-11-10 | End: 2020-11-10

## 2020-11-10 RX ORDER — GLUCOSAMINE HCL/CHONDROITIN SU 500-400 MG
CAPSULE ORAL
Qty: 100 EACH | Refills: 0 | Status: SHIPPED | OUTPATIENT
Start: 2020-11-10

## 2020-11-10 RX ORDER — MAGNESIUM SULFATE HEPTAHYDRATE 40 MG/ML
2 INJECTION, SOLUTION INTRAVENOUS ONCE
Status: COMPLETED | OUTPATIENT
Start: 2020-11-10 | End: 2020-11-10

## 2020-11-10 RX ADMIN — SODIUM CHLORIDE, POTASSIUM CHLORIDE, SODIUM LACTATE AND CALCIUM CHLORIDE 1000 ML: 600; 310; 30; 20 INJECTION, SOLUTION INTRAVENOUS at 16:18

## 2020-11-10 RX ADMIN — MAGNESIUM SULFATE 2 G: 2 INJECTION INTRAVENOUS at 17:19

## 2020-11-10 ASSESSMENT — FIBROSIS 4 INDEX: FIB4 SCORE: 0.68

## 2020-11-10 NOTE — CONSULTS
"Neurology Initial Consult H&P  Neurohospitalist Service, [facility]    Referring Physician: Darinel Faye M.D.    Chief Complaint   Patient presents with   • Syncope     \"stood up and passed out\" around 1200, felt BS was low, stood up to go get food from kitchen, had syncopal event       HPI: Juanita Montero is a 35 y.o. woman w/ a hx of Hasimoto's thyroiditis, well controlled on medication, presenting for whom neurology has been consulted for syncope and subsequent head trauma.  Earlier this AM, patient was at home, when she felt dehydrated, \"sweaty,\" followed by a feeling of \"lightheadedness.\"  This was followed by an episode of LOC.  The patient, upon regaining consciousness, was back to baseline within seconds to minutes; without post ictal state of confusion, agitation, or disorientation.  The patient notes that over the past several months she has experienced similar symptoms of lightheadedness noted with either changes in position, and/or changes in positioning; today' episode was provoked by a change from lying on the floor to standing.  Patient adds that she had had recurrent episodes of feeling lightheaded, without LOC, for the past 6 weeks, with increase in frequency over the past month, this being the third event in that interval.  BP in ED is soft to  systolic.  Secondary to the fall, the patient did incur head trauma, with pain localized to the point of impact: right frontal forehead.  Patient notes that since the event (earlier this AM), over the course of hours the pain has progressed in terms of quality, described as throbbing with an associated sensation of tightness that wraps around the head in a band-like fashion.  The patient adds a \"twitching like\" sensation affecting her right eye over the past two hours.  The patient adds her jaw is painful, sharp pain, while eating since onset from the event.  Denies changes in vision, no changes in speech, no " vertigo, no weakness, nor numbness.  No loss of incontinence, nor tongue biting.    Review of systems: In addition to what is detailed in the HPI above, all other systems reviewed and are negative.    Past Medical History:    has a past medical history of ADHD, Allergy, Anxiety, Depression, Hashimoto's disease, Hyperthyroidism, and Restless leg syndrome.    FHx:  family history includes No Known Problems in her father; Osteoporosis in her mother.    SHx:   reports that she has never smoked. She has never used smokeless tobacco. She reports current alcohol use. She reports current drug use. Drugs: Marijuana and Oral.    Allergies:  Allergies   Allergen Reactions   • Eggs Anaphylaxis   • Shellfish Allergy Anaphylaxis   • Wheat Bran Anaphylaxis   • Ibuprofen Anaphylaxis   • Tree Nuts Food Allergy Shortness of Breath       Medications:    Current Facility-Administered Medications:   •  diphenhydrAMINE (BENADRYL) injection 25 mg, 25 mg, Intravenous, Once, Can Senior M.D.  •  metoclopramide (REGLAN) injection 10 mg, 10 mg, Intravenous, Once, Can Senior M.D.    Current Outpatient Medications:   •  VYVANSE 30 MG capsule, , Disp: , Rfl:   •  albuterol 108 (90 Base) MCG/ACT Aero Soln inhalation aerosol, Inhale 2 Puffs by mouth every 6 hours as needed for Shortness of Breath., Disp: 8.5 g, Rfl: 1  •  MAGNESIUM PO, Take  by mouth., Disp: , Rfl:   •  DIGESTIVE ENZYMES PO, Take  by mouth., Disp: , Rfl:   •  Probiotic Product (PROBIOTIC PO), Take  by mouth., Disp: , Rfl:   •  liothyronine (CYTOMEL) 5 MCG Tab, Take 1 Tab by mouth every day., Disp: 90 Tab, Rfl: 3  •  levothyroxine (SYNTHROID) 25 MCG Tab, Take 1 Tab by mouth Every morning on an empty stomach., Disp: 90 Tab, Rfl: 3  •  levothyroxine (SYNTHROID) 200 MCG Tab, Take 1 Tab by mouth Every morning on an empty stomach., Disp: 90 Tab, Rfl: 3    Physical Examination:     Vitals:    11/10/20 1534 11/10/20 1535 11/10/20 1536   BP:   125/76   Pulse:   60   Resp:   15  "  Temp:  37.3 °C (99.2 °F) 36.7 °C (98 °F)   TempSrc:  Temporal Temporal   SpO2:   99%   Weight: 70.7 kg (155 lb 13.8 oz)     Height: 1.778 m (5' 10\")         General: Patient is awake and in no acute distress  Eyes: examination of optic disks not indicated at this time given acuity of consult  CV: RRR    NEUROLOGICAL EXAM:     Mental status: Awake, alert and fully oriented, follows commands  Speech and language: speech is not dysarthric. The patient is able to name and repeat.  Cranial nerve exam: Pupils are equal, round and reactive to light bilaterally. Visual fields are full. Extraocular muscles are intact. Sensation in the face is intact to light touch. Face is symmetric. Hearing to finger rub equal. Palate elevates symmetrically. Shoulder shrug is full. Tongue is midline.  Motor exam: Strength is 5/5 in all extremities both distally and proximally. Tone is normal. No abnormal movements were seen on exam.  Sensory exam: No sensory deficits identified   Deep tendon reflexes:  1+ and symmetric. Toes down-going bilaterally.  Coordination: no ataxia   Gait: normal    Objective Data:    Labs:  No results found for: PROTHROMBTM, INR   Lab Results   Component Value Date/Time    WBC 4.8 02/07/2020 06:12 AM    RBC 4.46 02/07/2020 06:12 AM    HEMOGLOBIN 14.0 02/07/2020 06:12 AM    HEMATOCRIT 42.8 02/07/2020 06:12 AM    MCV 96.0 02/07/2020 06:12 AM    MCH 31.4 02/07/2020 06:12 AM    MCHC 32.7 (L) 02/07/2020 06:12 AM    MPV 10.7 02/07/2020 06:12 AM    NEUTSPOLYS 50.30 02/07/2020 06:12 AM    LYMPHOCYTES 40.90 02/07/2020 06:12 AM    MONOCYTES 6.10 02/07/2020 06:12 AM    EOSINOPHILS 1.90 02/07/2020 06:12 AM    BASOPHILS 0.60 02/07/2020 06:12 AM      Lab Results   Component Value Date/Time    SODIUM 138 02/07/2020 06:12 AM    POTASSIUM 4.0 02/07/2020 06:12 AM    CHLORIDE 104 02/07/2020 06:12 AM    CO2 29 02/07/2020 06:12 AM    GLUCOSE 82 02/07/2020 06:12 AM    BUN 25 (H) 02/07/2020 06:12 AM    CREATININE 0.84 02/07/2020 06:12 " AM      Lab Results   Component Value Date/Time    CHOLSTRLTOT 149 02/07/2020 06:12 AM    LDL 75 02/07/2020 06:12 AM    HDL 61 02/07/2020 06:12 AM    TRIGLYCERIDE 64 02/07/2020 06:12 AM       Lab Results   Component Value Date/Time    ALKPHOSPHAT 53 02/07/2020 06:12 AM    ASTSGOT 30 02/07/2020 06:12 AM    ALTSGPT 34 02/07/2020 06:12 AM    TBILIRUBIN 0.4 02/07/2020 06:12 AM        Imaging/Testing:    I interpreted and/or reviewed the patient's neuroimaging    CT-HEAD W/O    (Results Pending)   CT-CSPINE WITHOUT PLUS RECONS    (Results Pending)   CT-MAXILLOFACIAL W/O    (Results Pending)       Assessment and Plan:    Juanita Montero is a 35 y.o. woman presenting for whom neurology has been consulted for an episode of LOC followed by headache secondary to right frontal head trauma.    1.  Syncope - most likely etiology is orthostatic given change in positioning and associated soft BP.  Possible contributing factor includes diet as is pertains to hydration and concordant glycemic status.  Other differentials includes vasovagal vs. Cardiac (albeit less likely).  Given hx of Hashimotos, must consider thyroid function as well.    - EKG  - CMP with Magnesium  - TSH, B12, thiamine  - orthostatic BP    2. Head trauma with associated headache.  The progression of symptomatology, with change in character, and quality of headache, and associated eye paresthesia vs. Numbness warrants neuroimaging.    Plan:    - CT head, CT s-spine, CT maxilofacial STAT  - 2g Magnesium, and IVF    The evaluation of the patient, and recommended management, was discussed with Dr. Faye at bedside.    Can Senior MD  Neurohospitalist, Acute Care Services   of Neurology

## 2020-11-10 NOTE — ED PROVIDER NOTES
"ED Provider Note    CHIEF COMPLAINT  Chief Complaint   Patient presents with   • Syncope     \"stood up and passed out\" around 1200, felt BS was low, stood up to go get food from kitchen, had syncopal event       HPI    Primary care provider: Beth Senior M.D.  Means of arrival: POV  History obtained from: Patient  History limited by: Nothing    Juanita Montero is a 35 y.o. female who presents with concerns for an episode of syncope and collapse.  The patient has had numerous episodes of near syncope throughout life, usually attributed to low blood sugars.  Recently she has had a glucometer but has not had one for years.  Over the last month she has had 3 presyncopal events, but today she stood up from sitting felt lightheaded and then passed out, she fell forward and did strike her head.  She struck her right head, forehead pain but also neck pain.  She awoke after several minutes of loss of consciousness, no incontinence or tongue bite.  No intraoral injury or bleeding.  No history of seizure.  No chest pain.  No other recent illness.  She thinks may she not enough today she usually eats frequent small meals.  She also may not amount of water to drink she usually does a good job to self hydrate.  Denies any fevers or dyspnea or cough or known Covid contact.  Developed a throbbing sensation around her right eye.  Head is described as mild to moderate and very constant since this fall and injury, dull and achy feeling from her right forehead to her posterior head and upper neck.  No history of serious head injury.    REVIEW OF SYSTEMS  Constitutional: Negative for fever or chills.   HENT: Negative for rhinorrhea or sore throat.  Positive facial pain.  Positive for headache posttraumatic.  Eyes: Negative for double or blurry or loss of vision.  Respiratory: Negative for cough or shortness of breath.    Cardiovascular: Negative for chest pain or palpitations, but positive for syncope and collapse. " "  Gastrointestinal: Negative for nausea, vomiting, or abdominal pain.   Genitourinary: Negative for dysuria or flank pain.   Musculoskeletal: Negative for back pain or joint pain.   Skin: Negative for itching or rash.   Neurological: Negative for sensory or motor changes.   Psychiatric/Behavioral: Negative for depression or suicidal ideas.   See HPI for further details. All other systems are negative.     PAST MEDICAL HISTORY   has a past medical history of ADHD, Allergy, Anxiety, Depression, Hashimoto's disease, Hyperthyroidism, and Restless leg syndrome.    PAST FAMILY HISTORY  Family History   Problem Relation Age of Onset   • Osteoporosis Mother    • No Known Problems Father        SOCIAL HISTORY  Social History     Tobacco Use   • Smoking status: Never Smoker   • Smokeless tobacco: Never Used   Substance and Sexual Activity   • Alcohol use: Yes     Frequency: 2-4 times a month     Drinks per session: 1 or 2     Binge frequency: Never   • Drug use: Yes     Types: Marijuana, Oral     Comment:  a few times a month   • Sexual activity: Yes       SURGICAL HISTORY   has a past surgical history that includes mammoplasty augmentation (2011).    CURRENT MEDICATIONS  Home Medications    **Home medications have not yet been reviewed for this encounter**         ALLERGIES  Allergies   Allergen Reactions   • Eggs Anaphylaxis   • Shellfish Allergy Anaphylaxis   • Wheat Bran Anaphylaxis   • Ibuprofen Anaphylaxis   • Tree Nuts Food Allergy Shortness of Breath       PHYSICAL EXAM  VITAL SIGNS: /71   Pulse (!) 56   Temp 36.7 °C (98 °F) (Temporal)   Resp 15   Ht 1.778 m (5' 10\")   Wt 70.7 kg (155 lb 13.8 oz)   LMP 11/03/2020   SpO2 97%   BMI 22.36 kg/m²    Pulse ox interpretation: On room air, I interpret this pulse ox as normal.  Constitutional: Well-developed, well-nourished. Sitting up.   HEENT: Normocephalic, contusion to right forehead, no hemotympanum or lai signs. Posterior pharynx clear, mucous membranes " moist.  Eyes:  EOMI. Normal sclerae.  PERRLA 3-2.  Neck: Supple, tenderness to the upper C-spine no step-offs.  Chest/Pulmonary: Clear to ausculation bilaterally, no wheezes or rhonchi.  Cardiovascular: Regular rate and rhythm, no murmur.   Abdomen: Soft, nontender; no rebound, guarding, or masses.  Back: No CVA or midline tenderness.   Musculoskeletal: No deformity or edema or extremity tenderness.  Neuro: Clear speech, normal coordination, cranial nerves II-XII grossly intact, no focal asymmetry or sensory deficits.   Psych: Normal mood and affect.  Skin: No rashes, warm and dry.  Abrasion present to the right lower extremity.      DIAGNOSTIC STUDIES / PROCEDURES    LABS & EKG  Results for orders placed or performed during the hospital encounter of 11/10/20   HCG QUAL SERUM   Result Value Ref Range    Beta-Hcg Qualitative Serum Negative Negative   VITAMIN B12   Result Value Ref Range    Vitamin B12 -True Cobalamin 601 211 - 911 pg/mL   TSH WITH REFLEX TO FT4   Result Value Ref Range    TSH 3.170 0.380 - 5.330 uIU/mL   CBC WITH DIFFERENTIAL   Result Value Ref Range    WBC 9.0 4.8 - 10.8 K/uL    RBC 4.77 4.20 - 5.40 M/uL    Hemoglobin 14.7 12.0 - 16.0 g/dL    Hematocrit 43.4 37.0 - 47.0 %    MCV 91.0 81.4 - 97.8 fL    MCH 30.8 27.0 - 33.0 pg    MCHC 33.9 33.6 - 35.0 g/dL    RDW 40.4 35.9 - 50.0 fL    Platelet Count 273 164 - 446 K/uL    MPV 10.0 9.0 - 12.9 fL    Neutrophils-Polys 63.40 44.00 - 72.00 %    Lymphocytes 29.10 22.00 - 41.00 %    Monocytes 5.50 0.00 - 13.40 %    Eosinophils 1.20 0.00 - 6.90 %    Basophils 0.70 0.00 - 1.80 %    Immature Granulocytes 0.10 0.00 - 0.90 %    Nucleated RBC 0.00 /100 WBC    Neutrophils (Absolute) 5.69 2.00 - 7.15 K/uL    Lymphs (Absolute) 2.61 1.00 - 4.80 K/uL    Monos (Absolute) 0.49 0.00 - 0.85 K/uL    Eos (Absolute) 0.11 0.00 - 0.51 K/uL    Baso (Absolute) 0.06 0.00 - 0.12 K/uL    Immature Granulocytes (abs) 0.01 0.00 - 0.11 K/uL    NRBC (Absolute) 0.00 K/uL   MAGNESIUM    Result Value Ref Range    Magnesium 2.2 1.5 - 2.5 mg/dL   Comp Metabolic Panel   Result Value Ref Range    Sodium 140 135 - 145 mmol/L    Potassium 4.2 3.6 - 5.5 mmol/L    Chloride 105 96 - 112 mmol/L    Co2 25 20 - 33 mmol/L    Anion Gap 10.0 7.0 - 16.0    Glucose 76 65 - 99 mg/dL    Bun 25 (H) 8 - 22 mg/dL    Creatinine 1.04 0.50 - 1.40 mg/dL    Calcium 8.9 8.4 - 10.2 mg/dL    AST(SGOT) 32 12 - 45 U/L    ALT(SGPT) 25 2 - 50 U/L    Alkaline Phosphatase 77 30 - 99 U/L    Total Bilirubin 0.3 0.1 - 1.5 mg/dL    Albumin 3.8 3.2 - 4.9 g/dL    Total Protein 6.8 6.0 - 8.2 g/dL    Globulin 3.0 1.9 - 3.5 g/dL    A-G Ratio 1.3 g/dL   ESTIMATED GFR   Result Value Ref Range    GFR If African American >60 >60 mL/min/1.73 m 2    GFR If Non African American 59 (A) >60 mL/min/1.73 m 2   EKG   Result Value Ref Range    Report       [facility] Emergency Dept.    Test Date:  2020-11-10  Pt Name:    ROSALIND AGRZA              Department: [facility]  MRN:        [id number]                      Room:  Gender:     Female                       Technician: HRR  :        [id number]                   Requested By:STEPHANIE MORLEY  Order #:    [id number]                    Reading MD: Darinel Faye MD    Measurements  Intervals                                Axis  Rate:       56                           P:          55  DC:         135                          QRS:        81  QRSD:       91                           T:          60  QT:         421  QTc:        407    Interpretive Statements  Sinus bradycardia  Consider left ventricular hypertrophy  No previous ECG available for comparison  No STEMI  Electronically Signed On 2020 9:27:36 PST by Darinel Faye MD           RADIOLOGY  CT-CSPINE WITHOUT PLUS RECONS   Final Result      No CT evidence of acute cervical spine abnormality.      Mild spondylosis with facet arthropathy greater than degenerative disc disease and this results in trace degenerative C3/4  anterolisthesis      CT-MAXILLOFACIAL W/O PLUS RECONS   Final Result      No displaced fracture of the facial bones.      Right mandibular second premolar dental disease      CT-HEAD W/O   Final Result      No acute intracranial abnormality is identified.          COURSE & MEDICAL DECISION MAKING    This is a 35 y.o. female who presents with an episode of syncope and collapse with head and facial injury.    Differential Diagnosis includes but is not limited to:  Syncope, dysrhythmia, dehydration, glucose abnormality, electrolyte abnormality concussion, fracture, facial injury, neck injury    ED Course:  This is a pleasant 35-year-old female with several episodes of near syncope recently but now with an episode of complete loss of consciousness.  This is associated with head injury she has face and neck pain as well.  Head injury with possible loss of consciousness and imaging of her head face and neck, labs, EKG, she look slightly dehydrated so we will treat her with a crystalloid fluid bolus as she must be kept n.p.o. until a surgical process is ruled out.    Thankfully the patient's work-up today is very reassuring no severe injuries on CT scans, labs stable aside from mildly elevated uremia concordant with my suspicion for possible dehydration.    On recheck after fluids patient feeling better asymptomatic.  I think she is safe for discharge.  Concussion precautions given, and she should follow-up with her PCP.  She reports low blood sugars in the past she is on the low end of normal today, I will prescribe a glucometer and if she has any symptoms she should check her blood sugar to see and have further hypoglycemia work-up with her primary care provider she has an appointment next week.  I will also refer her to cardiology for consideration of long-term cardiac monitoring she reports having a Holter in the past.  However, if she has any new or worsening symptoms she will return to the ER immediately.  Patient  understands and agrees with plan of care.  Patient is friends with one of our neurologists who also evaluated the patient in the ER, we are all comfortable with discharge.    Medications   lactated ringers infusion (BOLUS) (0 mL Intravenous Stopped 11/10/20 1756)   magnesium sulfate IVPB premix 2 g (0 g Intravenous Stopped 11/10/20 1755)       FINAL IMPRESSION  1. Syncope and collapse    2. Concussion with loss of consciousness of 30 minutes or less, initial encounter    3. Dehydration    4. Acquired hypothyroidism    5. Attention deficit hyperactivity disorder (ADHD), unspecified ADHD type    6. History of anxiety        PRESCRIPTIONS  Discharge Medication List as of 11/10/2020  5:57 PM      START taking these medications    Details   Blood Glucose Meter Kit Test blood sugar as recommended by provider when near-syncopal for hypoglycemia. One Touch Verio blood glucose monitoring kit.Or per formulary preference. Intermittent hypoglycemia.Disp-1 Kit, R-0, Normal      Blood Glucose Test Strips Use one One Touch Verio Flex strip to test blood sugar once daily .Or per formulary preference. Intermittent hypoglycemiaDisp-30 Each, R-1, Normal      Lancets Use one One Touch Verio Flex lancet to test blood sugar once daily .Or per formulary preference. Intermittent hypoglycemiaDisp-100 Each, R-0, Normal      Alcohol Swabs Wipe site with prep pad prior to injection.Per formulary preference. Intermittent hypoglycemia.Disp-100 Each, R-0, Normal             FOLLOW UP  Beth Senior M.D.  [address] [place] [address] [address] [place] [address] [phone]    Schedule an appointment as soon as possible for a visit in 1 week  for recheck and routine care    [facility], Emergency Dept  [address]  [place] [id number] [phone]  Today  If you have ANY new or worse symptoms!    Dick Cruz M.D.  [address]  [address]  [place] [address] [phone]    Schedule an appointment as soon as  possible for a visit in 1 week          -DISCHARGE-       Results, exam findings, clinical impression, presumed diagnosis, treatment options, and strict return precautions were discussed with the patient, and they verbalized understanding, agreed with, and appreciated the plan of care.    Pertinent Labs & Imaging studies reviewed and verified by myself, as well as nursing notes and the patient's past medical, family, and social histories (See chart for details).    The patient is referred to a primary physician for blood pressure management, diabetic screening, and for all other preventative health concerns.     Portions of this record were made with voice recognition software.  Despite my review, spelling/grammar/context errors may still remain. Interpretation of this chart should be taken in this context.    Electronically signed by Darinel Faye M.D. on 11/11/2020 at 9:33 AM.

## 2020-11-10 NOTE — ED TRIAGE NOTES
"Chief Complaint   Patient presents with   • Syncope     \"stood up and passed out\" around 1200, felt BS was low, stood up to go get food from kitchen, had syncopal event     /76   Pulse 60   Temp 36.7 °C (98 °F) (Temporal)   Resp 15   Ht 1.778 m (5' 10\")   Wt 70.7 kg (155 lb 13.8 oz)   LMP 11/03/2020   SpO2 99%   BMI 22.36 kg/m²     Covid Screen Negative.  "

## 2020-11-11 LAB — EKG IMPRESSION: NORMAL

## 2020-11-11 NOTE — DISCHARGE INSTRUCTIONS
You were seen and evaluated in the Emergency Department at [facility] for:     Passing out and hitting her head.    You had the following tests and studies:    Thankfully, your work-up today is very reassuring.  Your brain neck and face scans do not show any dangerous injuries.  Your blood tests are very reassuring aside from some mild dehydration.    You received the following medications:    IV fluids.  IV magnesium.    You received the following prescriptions:    We will prescribe you a glucose monitor in case this is related to low blood sugar, check this anytime you feel symptomatic.  ----------------------------    Please make sure to follow up with:    Your primary care provider next week for recheck and to discuss further possible work-up of low blood sugar, including possible abdominal and adrenal imaging.     Dr. Cruz, cardiology, to consider long term heart rhythm monitoring.     However, if you have any new or worsening symptoms you need to return to the ER immediately.    Good luck, we hope you get better soon!  ----------------------------    We always encourage patients to return IMMEDIATELY if they have:  Increased or changing pain, passing out, fevers over 100.4 (taken in your mouth or rectally) for more than 2 days, redness or swelling of skin or tissues, feeling like your heart is beating fast, chest pain that is new or worsening, trouble breathing, feeling like your throat is closing up and can not breath, inability to walk, weakness of any part of your body, new dizziness, severe bleeding that won't stop from any part of your body, if you can't eat or drink, or if you have any other concerns.   If you feel worse, please know that you can always return with any questions, concerns, worse symptoms, or you are feeling unsafe. We certainly cannot say for sure that we have ruled out every illness or dangerous disease, but we feel that at this specific time, your exam, tests, and vital  signs like heart rate and blood pressure are safe for discharge.

## 2020-11-15 LAB — VIT B1 BLD-MCNC: 113 NMOL/L (ref 70–180)

## 2020-11-20 ENCOUNTER — TELEMEDICINE (OUTPATIENT)
Dept: MEDICAL GROUP | Facility: IMAGING CENTER | Age: 35
End: 2020-11-20
Payer: COMMERCIAL

## 2020-11-20 VITALS — BODY MASS INDEX: 21.62 KG/M2 | TEMPERATURE: 97.5 F | HEIGHT: 70 IN | HEART RATE: 60 BPM | WEIGHT: 151 LBS

## 2020-11-20 DIAGNOSIS — R94.31 ABNORMAL EKG: ICD-10-CM

## 2020-11-20 DIAGNOSIS — R55 SYNCOPE, UNSPECIFIED SYNCOPE TYPE: ICD-10-CM

## 2020-11-20 DIAGNOSIS — E86.0 DEHYDRATION: ICD-10-CM

## 2020-11-20 PROCEDURE — 99214 OFFICE O/P EST MOD 30 MIN: CPT | Mod: 95,CR | Performed by: FAMILY MEDICINE

## 2020-11-20 ASSESSMENT — FIBROSIS 4 INDEX: FIB4 SCORE: 0.87

## 2020-11-20 NOTE — PROGRESS NOTES
Telemedicine Visit: New Patient     This encounter was conducted via Zoom.   Verbal consent was obtained. Patient's identity was verified. Patient aware this will be   billed the same as an in person evaluation.  Patient in home, I am in office at [address] [place] [address]  Subjective:     Chief Complaint   Patient presents with   • Syncope     Nov 10th, stood up and felt dizzy and hit head on bed    • Dizziness       Juanita Montero is a 35 y.o. female with history of ADHD, hypothyroidism, anxiety, mild intermittent asthma, on virtual visit to discuss follow-up after ER visit for syncopal episode and mild concussion.  Patient was noted to be dehydrated per chart though patient reports self and mother have chronically elevated bun.  She reports significant meals and blood sugar being in 70s within 20 minutes. She has not had episodes of hypoglycemia nor a correlation with symptoms. glucose highest 127. She feels emotional, nauseated, shaky when she feels like her sugar is low. Ruled out orthostatic blood pressure at er. Does have several symptoms. Such as heart racing. Every time she uses a gummy just lastely she will get a racing heart rate.  She reports that she drinks 632 ounce bottles of water daily and when she works out she drinks 8 of them.    Date and time of injury: 11/10/20  Amnesia before or after or loss of consciousness: No  Seizure activity: denies    Still wearing sunglasses. And can not read for too long she gets symptoms otherwise she feels fine.     Risk factors: History of concussion history of headache developmental history psych history: some anxiety otherwise no    Red flags: Headache that has worsened, seizures, focal neurological signs, looks very drowsy, repeated vomiting, slurred speech, can't recognize people's faces or places, increasing confusion or irritability, weakness or numbness in the arms or legs, neck pain, unusual behavioral changes, change in state of  consciousness: none       ROS  See HPI  Constitutional: Negative for fever, chills and malaise/fatigue.   HENT: Negative for congestion, itchy watery eyes  Eyes: Negative for pain or sudden changes in vision  Respiratory: Negative for cough and shortness of breath.    Cardiovascular: Negative for leg swelling or chest pain  Gastrointestinal: Negative for nausea, vomiting, abdominal pain and diarrhea.   Genitourinary: Negative for dysuria and hematuria.   Skin: Negative for rash or concerning moles   Neurological: Negative for dizziness, focal weakness   Psychiatric/Behavioral: Negative for depression.  The patient is not nervous/anxious.    Musculoskeletal: no weakness or joint stiffness    Allergies   Allergen Reactions   • Eggs Anaphylaxis   • Shellfish Allergy Anaphylaxis   • Wheat Bran Anaphylaxis   • Ibuprofen Anaphylaxis   • Tree Nuts Food Allergy Shortness of Breath       Current medicines (including changes today)  Current Outpatient Medications   Medication Sig Dispense Refill   • Blood Glucose Meter Kit Test blood sugar as recommended by provider when near-syncopal for hypoglycemia. One Touch Verio blood glucose monitoring kit. 1 Kit 0   • Blood Glucose Test Strips Use one One Touch Verio Flex strip to test blood sugar once daily . 30 Each 1   • Lancets Use one One Touch Verio Flex lancet to test blood sugar once daily . 100 Each 0   • Alcohol Swabs Wipe site with prep pad prior to injection. 100 Each 0   • VYVANSE 30 MG capsule      • albuterol 108 (90 Base) MCG/ACT Aero Soln inhalation aerosol Inhale 2 Puffs by mouth every 6 hours as needed for Shortness of Breath. 8.5 g 1   • MAGNESIUM PO Take  by mouth.     • DIGESTIVE ENZYMES PO Take  by mouth.     • Probiotic Product (PROBIOTIC PO) Take  by mouth.     • liothyronine (CYTOMEL) 5 MCG Tab Take 1 Tab by mouth every day. 90 Tab 3   • levothyroxine (SYNTHROID) 25 MCG Tab Take 1 Tab by mouth Every morning on an empty stomach. 90 Tab 3   • levothyroxine  "(SYNTHROID) 200 MCG Tab Take 1 Tab by mouth Every morning on an empty stomach. 90 Tab 3     No current facility-administered medications for this visit.        She  has a past medical history of ADHD, Allergy, Anxiety, Depression, Hashimoto's disease, Hyperthyroidism, and Restless leg syndrome.  She  has a past surgical history that includes mammoplasty augmentation (2011).      Family History   Problem Relation Age of Onset   • Osteoporosis Mother    • No Known Problems Father      Family Status   Relation Name Status   • Mo  Alive   • Fa  Alive       Patient Active Problem List    Diagnosis Date Noted   • Mild intermittent asthma without complication 06/22/2020   • Anxiety 12/05/2019   • Acquired hypothyroidism 08/29/2016   • Encounter for initial prescription of contraceptive pills 07/13/2016   • Attention deficit hyperactivity disorder 07/13/2016          Objective:   Vitals obtained by patient:  Pulse 60   Temp 36.4 °C (97.5 °F)   Ht 1.778 m (5' 10\")   Wt 68.5 kg (151 lb)   LMP 11/03/2020   BMI 21.67 kg/m²     Physical Exam:  Constitutional: Alert, no distress, well-groomed.  Skin: No rashes in visible areas.  Eye: Round. Conjunctiva clear, lids normal. No icterus.   ENMT: Lips pink without lesions, good dentition, moist mucous membranes. Phonation normal.  Neck: No masses, no thyromegaly. Moves freely without pain.  CV: Pulse as reported by patient  Respiratory: Unlabored respiratory effort, no cough or audible wheeze  Psych: Alert and oriented x3, normal affect and mood.   Neuro: symmetric face. Alert and oriented. Follows commands. No aphasia or dysarthria.    Labs:  Admission on 11/10/2020, Discharged on 11/10/2020   Component Date Value Ref Range Status   • Report 11/10/2020    Final                    Value:[facility]    Test Date:  2020-11-10  Pt Name:    ROSALIND GARZA              Department: [facility]  MRN:        [id number]                      Room:  Gender:    "  Female                       Technician: HRR  :        [id number]                   Requested By:STEPHANIE MORLEY  Order #:    [id number]                    Reading MD: Darinel Faye MD    Measurements  Intervals                                Axis  Rate:       56                           P:          55  AR:         135                          QRS:        81  QRSD:       91                           T:          60  QT:         421  QTc:        407    Interpretive Statements  Sinus bradycardia  Consider left ventricular hypertrophy  No previous ECG available for comparison  No STEMI  Electronically Signed On 2020 9:27:36 PST by Darinel Faye MD     • Beta-Hcg Qualitative Serum 11/10/2020 Negative  Negative Final   • Vitamin B12 -True Cobalamin 11/10/2020 601  211 - 911 pg/mL Final   • TSH 11/10/2020 3.170  0.380 - 5.330 uIU/mL Final    Comment: Please note new reference ranges effective 2017 12:30 PM  Pregnant Females, 1st Trimester  0.050-3.700  Pregnant Females, 2nd Trimester  0.310-4.350  Pregnant Females, 3rd Trimester  0.410-5.180     • WBC 11/10/2020 9.0  4.8 - 10.8 K/uL Final   • RBC 11/10/2020 4.77  4.20 - 5.40 M/uL Final   • Hemoglobin 11/10/2020 14.7  12.0 - 16.0 g/dL Final   • Hematocrit 11/10/2020 43.4  37.0 - 47.0 % Final   • MCV 11/10/2020 91.0  81.4 - 97.8 fL Final   • MCH 11/10/2020 30.8  27.0 - 33.0 pg Final   • MCHC 11/10/2020 33.9  33.6 - 35.0 g/dL Final   • RDW 11/10/2020 40.4  35.9 - 50.0 fL Final   • Platelet Count 11/10/2020 273  164 - 446 K/uL Final   • MPV 11/10/2020 10.0  9.0 - 12.9 fL Final   • Neutrophils-Polys 11/10/2020 63.40  44.00 - 72.00 % Final   • Lymphocytes 11/10/2020 29.10  22.00 - 41.00 % Final   • Monocytes 11/10/2020 5.50  0.00 - 13.40 % Final   • Eosinophils 11/10/2020 1.20  0.00 - 6.90 % Final   • Basophils 11/10/2020 0.70  0.00 - 1.80 % Final   • Immature Granulocytes 11/10/2020 0.10  0.00 - 0.90 % Final   • Nucleated RBC 11/10/2020 0.00  /100 WBC  Final   • Neutrophils (Absolute) 11/10/2020 5.69  2.00 - 7.15 K/uL Final    Includes immature neutrophils, if present.   • Lymphs (Absolute) 11/10/2020 2.61  1.00 - 4.80 K/uL Final   • Monos (Absolute) 11/10/2020 0.49  0.00 - 0.85 K/uL Final   • Eos (Absolute) 11/10/2020 0.11  0.00 - 0.51 K/uL Final   • Baso (Absolute) 11/10/2020 0.06  0.00 - 0.12 K/uL Final   • Immature Granulocytes (abs) 11/10/2020 0.01  0.00 - 0.11 K/uL Final   • NRBC (Absolute) 11/10/2020 0.00  K/uL Final   • Magnesium 11/10/2020 2.2  1.5 - 2.5 mg/dL Final   • Vitamin B1 11/10/2020 113  70 - 180 nmol/L Final    Comment: INTERPRETIVE INFORMATION: Vitamin B1, Whole Blood  This assay measures the concentration of thiamine diphosphate  (TDP), the primary active form of vitamin B1. Approximately 90  percent of vitamin B1 present in whole blood is TDP. Thiamine and  thiamine monophosphate, which comprise the remaining 10 percent,  are not measured.  Test developed and characteristics determined by [employer]. See Compliance Statement B: [email or web address]  Performed By: [employer]  [address] [place] [id number]  : Loren Sheppard MD     • Sodium 11/10/2020 140  135 - 145 mmol/L Final   • Potassium 11/10/2020 4.2  3.6 - 5.5 mmol/L Final   • Chloride 11/10/2020 105  96 - 112 mmol/L Final   • Co2 11/10/2020 25  20 - 33 mmol/L Final   • Anion Gap 11/10/2020 10.0  7.0 - 16.0 Final   • Glucose 11/10/2020 76  65 - 99 mg/dL Final   • Bun 11/10/2020 25* 8 - 22 mg/dL Final   • Creatinine 11/10/2020 1.04  0.50 - 1.40 mg/dL Final   • Calcium 11/10/2020 8.9  8.4 - 10.2 mg/dL Final   • AST(SGOT) 11/10/2020 32  12 - 45 U/L Final   • ALT(SGPT) 11/10/2020 25  2 - 50 U/L Final   • Alkaline Phosphatase 11/10/2020 77  30 - 99 U/L Final   • Total Bilirubin 11/10/2020 0.3  0.1 - 1.5 mg/dL Final   • Albumin 11/10/2020 3.8  3.2 - 4.9 g/dL Final   • Total Protein 11/10/2020 6.8  6.0 - 8.2 g/dL Final   • Globulin 11/10/2020 3.0  1.9 -  3.5 g/dL Final   • A-G Ratio 11/10/2020 1.3  g/dL Final   • GFR If  11/10/2020 >60  >60 mL/min/1.73 m 2 Final   • GFR If Non  11/10/2020 59* >60 mL/min/1.73 m 2 Final       Imaging:   Ct-cspine Without Plus Recons    Result Date: 11/10/2020  11/10/2020 4:39 PM HISTORY/REASON FOR EXAM: Neck pain, initial exam. Neck pain TECHNIQUE/EXAM DESCRIPTION: CT cervical spine without contrast, with reconstructions. Thin-section helical scanning was performed from the skull base through T1. Sagittal and coronal multiplanar reconstructions were generated from the axial images. Low dose optimization technique was utilized for this CT exam including automated exposure control and adjustment of the mA and/or kV according to patient size. COMPARISON: None available. FINDINGS: No fracture or subluxation is seen. No abnormal prevertebral soft tissue swelling. There is mild C5/6 disc height loss and uncovertebral spurring There is moderate facet arthropathy, greatest at C3/4 where there is trace anterolisthesis Alignment is otherwise normal The visualized aerodigestive tract is normal in appearance. No pneumothorax at the lung apices.     No CT evidence of acute cervical spine abnormality. Mild spondylosis with facet arthropathy greater than degenerative disc disease and this results in trace degenerative C3/4 anterolisthesis    Ct-head W/o    Result Date: 11/10/2020  11/10/2020 4:39 PM HISTORY/REASON FOR EXAM:  Headache, acute, normal neuro exam; Head trauma, minor, normal mental status (Age 19-64y); LOC w head trauma. Ground-level fall, syncope TECHNIQUE/EXAM DESCRIPTION AND NUMBER OF VIEWS:    CT of the head without contrast. Contiguous 5 mm axial sections were obtained from the skull base through the vertex. Up to date radiation dose reduction adjustments have been utilized to meet ALARA standards for radiation dose reduction. COMPARISON:   None. FINDINGS: No acute intracranial hemorrhage is seen.  There is no midline shift. Ventricles are normal in size and configuration. Gray white junction differentiation is distinct. Visualized paranasal sinuses and mastoid air cells are clear. No acute calvarium abnormality is noted.     No acute intracranial abnormality is identified.    Ct-maxillofacial W/o Plus Recons    Result Date: 11/10/2020  11/10/2020 4:39 PM HISTORY/REASON FOR EXAM: TMJ pain or limited movement. Pain. TECHNIQUE/EXAM DESCRIPTION AND NUMBER OF VIEWS: CT scan maxillofacial without contrast, with reconstructions. Thin-section helical imaging was obtained of the face from the orbital roofs through the mandible. Coronal multiplanar volume reformat images were generated from the axial data. Low dose optimization technique was utilized for this CT exam including automated exposure control and adjustment of the mA and/or kV according to patient size. COMPARISON: None. FINDINGS: There is mild anterior soft tissue swelling. No displaced fracture is detected. The nasal bones are within normal limits in appearance. No abnormal opacification of the sinuses is seen. The mandible is normally positioned. The right second mandibular premolar has periapical lucency No orbital blowout fracture. The globes are symmetric and within normal limits in appearance. No post-septal abnormality.     No displaced fracture of the facial bones. Right mandibular second premolar dental disease      Assessment and Plan:   The following treatment plan was discussed:   -Review beta-hCG negative B12 normal TSH normal, CBC normal magnesium normal, sodium and potassium normal BUN 25 creatinine 1.04.  Liver enzymes normal.  GFR over 60  -EKG with a heart rate of 56 .  Consider left ventricular hypertrophy per EKG  -CT spine with mild spondylosis with facet arthropathy C3-4  -CT maxillofacial right mandibular second premolar dental disease  -CT head normal  -We discussed whether or not it is necessary to determine if she has an  excess of insulin.  Especially given that she is very active has a high muscle mass I suspect her body releases the appropriate amount of insulin to allow the muscles to take up nutrients as ingested.  This was discussed with the patient.  She understands and also does not feel that it is necessary to do a work-up with insulin and C-peptide and proinsulin's.  If her circumstances changes we will certainly put in work-up for this.      Problem List Items Addressed This Visit     None      Visit Diagnoses     Dehydration        Syncope, unspecified syncope type        Relevant Orders    EC-ECHOCARDIOGRAM COMPLETE W/O CONT    REFERRAL TO CARDIOLOGY    Abnormal EKG        Relevant Orders    EC-ECHOCARDIOGRAM COMPLETE W/O CONT          Follow-up: Return if symptoms worsen or fail to improve.        Portions of this note may be dictated using Dragon [employer] voice recognition software.  Variances in spelling and vocabulary are possible and unintentional.  Not all areas may be caught/corrected.  Please notify me if any discrepancies are noted or if the meaning of any statement is not correct/clear.

## 2020-11-25 ENCOUNTER — HOSPITAL ENCOUNTER (OUTPATIENT)
Dept: CARDIOLOGY | Facility: MEDICAL CENTER | Age: 35
End: 2020-11-25
Attending: FAMILY MEDICINE
Payer: COMMERCIAL

## 2020-11-25 DIAGNOSIS — R55 SYNCOPE, UNSPECIFIED SYNCOPE TYPE: ICD-10-CM

## 2020-11-25 DIAGNOSIS — R94.31 ABNORMAL EKG: ICD-10-CM

## 2020-11-25 LAB
LV EJECT FRACT  99904: 60
LV EJECT FRACT MOD 2C 99903: 66.2
LV EJECT FRACT MOD 4C 99902: 50.98
LV EJECT FRACT MOD BP 99901: 59.23

## 2020-11-25 PROCEDURE — 93306 TTE W/DOPPLER COMPLETE: CPT

## 2020-11-25 PROCEDURE — 93306 TTE W/DOPPLER COMPLETE: CPT | Mod: 26 | Performed by: INTERNAL MEDICINE

## 2020-12-20 ENCOUNTER — HOSPITAL ENCOUNTER (OUTPATIENT)
Facility: MEDICAL CENTER | Age: 35
End: 2020-12-20
Attending: FAMILY MEDICINE
Payer: COMMERCIAL

## 2020-12-20 ENCOUNTER — OFFICE VISIT (OUTPATIENT)
Dept: URGENT CARE | Facility: CLINIC | Age: 35
End: 2020-12-20
Payer: COMMERCIAL

## 2020-12-20 VITALS
WEIGHT: 153.4 LBS | BODY MASS INDEX: 21.96 KG/M2 | HEART RATE: 78 BPM | TEMPERATURE: 99.1 F | OXYGEN SATURATION: 97 % | RESPIRATION RATE: 20 BRPM | HEIGHT: 70 IN | SYSTOLIC BLOOD PRESSURE: 118 MMHG | DIASTOLIC BLOOD PRESSURE: 70 MMHG

## 2020-12-20 DIAGNOSIS — J06.9 URI, ACUTE: ICD-10-CM

## 2020-12-20 PROCEDURE — 99213 OFFICE O/P EST LOW 20 MIN: CPT | Performed by: FAMILY MEDICINE

## 2020-12-20 PROCEDURE — U0003 INFECTIOUS AGENT DETECTION BY NUCLEIC ACID (DNA OR RNA); SEVERE ACUTE RESPIRATORY SYNDROME CORONAVIRUS 2 (SARS-COV-2) (CORONAVIRUS DISEASE [COVID-19]), AMPLIFIED PROBE TECHNIQUE, MAKING USE OF HIGH THROUGHPUT TECHNOLOGIES AS DESCRIBED BY CMS-2020-01-R: HCPCS

## 2020-12-20 ASSESSMENT — FIBROSIS 4 INDEX: FIB4 SCORE: 0.87

## 2020-12-21 DIAGNOSIS — J06.9 URI, ACUTE: ICD-10-CM

## 2020-12-21 NOTE — PROGRESS NOTES
Chief Complaint   Patient presents with   • Other     loss of taste and smell, congested, had body aches and fever x 3 days         Cough  This is a new problem. The current episode started 3 d ago. The problem has been unchanged. The problem occurs constantly. The cough is dry. Associated symptoms include : nausea, fatigue, headaches,   fever. Pertinent negatives include no    vomiting, diarrhea, sweats, weight loss or wheezing. Nothing aggravates the symptoms.  Patient has tried nothing for the symptoms. There is a history of asthma, but denies wheezing, sob.        Past Medical History:   Diagnosis Date   • ADHD    • Allergy    • Anxiety    • Depression    • Hashimoto's disease    • Hyperthyroidism    • Restless leg syndrome          Social History     Tobacco Use   • Smoking status: Never Smoker   • Smokeless tobacco: Never Used   Substance Use Topics   • Alcohol use: Yes     Frequency: 2-4 times a month     Drinks per session: 1 or 2     Binge frequency: Never     Comment: rarely   • Drug use: Yes     Types: Marijuana, Oral     Comment:  a few times a month/occ           Family History   Problem Relation Age of Onset   • Osteoporosis Mother    • No Known Problems Father                   Review of Systems   Constitutional: + for fever,  and malaise/fatigue.   Eyes: Negative for vision changes, d/c.    Respiratory: + for cough .   Denies sputum production.    Cardiovascular: Negative for chest pain and palpitations.   Gastrointestinal: Negative for nausea, vomiting, abdominal pain, diarrhea and constipation.   Genitourinary: Negative for dysuria, urgency and frequency.   Skin: Negative for rash or  itching.   Neurological: Negative for dizziness and tingling.    Psychiatric/Behavioral: Negative for depression.   Hematologic/lymphatic - denies bruising or excessive bleeding  All other systems reviewed and are negative.             Objective:     /70 (BP Location: Right arm, Patient Position: Sitting, BP  "Cuff Size: Adult)   Pulse 78   Temp 37.3 °C (99.1 °F) (Temporal)   Resp 20   Ht 1.778 m (5' 10\")   Wt 69.6 kg (153 lb 6.4 oz)   SpO2 97%       Physical Exam   Constitutional: patient is oriented to person, place, and time. Patient appears well-developed and well-nourished. No distress.   HENT:   Head: Normocephalic and atraumatic.   Right Ear: External ear normal.   Left Ear: External ear normal.   TMs normal  Nose: Mucosal edema  present. Right sinus exhibits no maxillary sinus tenderness. Left sinus exhibits no maxillary sinus tenderness.   Mouth/Throat: Mucous membranes are normal. No oral lesions.  No posterior pharyngeal erythema.  No oropharyngeal exudate or posterior oropharyngeal edema.   Eyes: Conjunctivae and EOM are normal. Pupils are equal, round, and reactive to light. Right eye exhibits no discharge. Left eye exhibits no discharge. No scleral icterus.   Neck: Normal range of motion. Neck supple. No tracheal deviation present.   Cardiovascular: Normal rate, regular rhythm and normal heart sounds.  Exam reveals no friction rub.    Pulmonary/Chest: Effort normal. No respiratory distress. Patient has no wheezes or rhonchi. Patient has no rales.    Musculoskeletal:  exhibits no edema.   Lymphadenopathy:     Patient has no cervical adenopathy.      Neurological: patient is alert and oriented to person, place, and time.   Skin: Skin is warm and dry. No rash noted. No erythema.   Psychiatric: patient  has a normal mood and affect.  behavior is normal.   Nursing note and vitals reviewed.          Assesment/Plan:     1. URI, acute     COVID screen sent  Home isolation for now  Will call with results.     Follow up in one week if no improvement, sooner if symptoms worsen.           - COVID/SARS COV-2 PCR; Future      "

## 2020-12-22 LAB
COVID ORDER STATUS COVID19: NORMAL
SARS-COV-2 RNA RESP QL NAA+PROBE: DETECTED
SPECIMEN SOURCE: ABNORMAL

## 2021-01-21 ENCOUNTER — OFFICE VISIT (OUTPATIENT)
Dept: URGENT CARE | Facility: CLINIC | Age: 36
End: 2021-01-21
Payer: COMMERCIAL

## 2021-01-21 VITALS
DIASTOLIC BLOOD PRESSURE: 58 MMHG | SYSTOLIC BLOOD PRESSURE: 90 MMHG | RESPIRATION RATE: 12 BRPM | HEART RATE: 73 BPM | HEIGHT: 70 IN | BODY MASS INDEX: 21.47 KG/M2 | TEMPERATURE: 98.3 F | OXYGEN SATURATION: 96 % | WEIGHT: 150 LBS

## 2021-01-21 DIAGNOSIS — J45.20 MILD INTERMITTENT ASTHMA WITHOUT COMPLICATION: ICD-10-CM

## 2021-01-21 PROCEDURE — 99214 OFFICE O/P EST MOD 30 MIN: CPT | Performed by: PHYSICIAN ASSISTANT

## 2021-01-21 RX ORDER — PREDNISONE 10 MG/1
40 TABLET ORAL DAILY
Qty: 20 TAB | Refills: 0 | Status: SHIPPED | OUTPATIENT
Start: 2021-01-21 | End: 2021-01-26

## 2021-01-21 RX ORDER — ALBUTEROL SULFATE 0.63 MG/3ML
0.63 SOLUTION RESPIRATORY (INHALATION) EVERY 4 HOURS PRN
Qty: 15 ML | Refills: 1 | Status: SHIPPED
Start: 2021-01-21 | End: 2021-02-01

## 2021-01-21 RX ORDER — MONTELUKAST SODIUM 10 MG/1
10 TABLET ORAL DAILY
Qty: 30 TAB | Refills: 0 | Status: SHIPPED | OUTPATIENT
Start: 2021-01-21 | End: 2021-02-01 | Stop reason: SDUPTHER

## 2021-01-21 RX ORDER — ALBUTEROL SULFATE 90 UG/1
2 AEROSOL, METERED RESPIRATORY (INHALATION) EVERY 6 HOURS PRN
Qty: 8.5 G | Refills: 1 | Status: CANCELLED | OUTPATIENT
Start: 2021-01-21

## 2021-01-21 ASSESSMENT — ENCOUNTER SYMPTOMS
EYE PAIN: 0
HEADACHES: 0
CHILLS: 0
VOMITING: 0
COUGH: 0
CONSTIPATION: 0
ABDOMINAL PAIN: 0
WHEEZING: 1
NAUSEA: 0
DIARRHEA: 0
FEVER: 0
SORE THROAT: 0
SHORTNESS OF BREATH: 1
MYALGIAS: 0
SPUTUM PRODUCTION: 0

## 2021-01-21 ASSESSMENT — FIBROSIS 4 INDEX: FIB4 SCORE: 0.87

## 2021-01-21 NOTE — PROGRESS NOTES
Subjective:   Juanita Montero is a 35 y.o. female who presents for Shortness of Breath (being woken up in the middle of the night, worsening over past week ), Cough, and Other (Pt is noted that some of her oral supplements grew mold and is concerned that may be causing or making her issue worse)      HPI:  This is a 35-year-old female with history of asthma presenting complaining worsening shortness of breath especially at night.  The patient had a 10 to 15-day Covid illness at the end of December and felt much better at the beginning of the month.  She then traveled to [place] on vacation and had more significant wheezing and nighttime shortness of breath which was relieved with her albuterol inhaler.  She takes oral supplements and recently noticed that they become moldy after her trip to the humid environment.  She discontinued use of these supplements around 1 week ago but has continued to have significant nocturnal wheezing.  She has been using her inhaler 10-12 times per day and is almost out.  She has an appoint with her primary care provider in 2 weeks but did not feel like she was able to wait until then given her symptoms.  She has been on Singulair in the past which is been tremendously helpful, without any ill effects.  She denies any fever/chills, body aches.  Currently she feels like her breathing is much better, typically it is better during the day and then seems to get worse in the evening when she lays down.  She is not experiencing any significant postnasal drip.  She also takes a daily antihistamine.    Review of Systems   Constitutional: Negative for chills, fever and malaise/fatigue.   HENT: Negative for congestion, ear pain and sore throat.    Eyes: Negative for pain.   Respiratory: Positive for shortness of breath and wheezing. Negative for cough and sputum production.    Cardiovascular: Negative for chest pain.   Gastrointestinal: Negative for abdominal pain, constipation, diarrhea,  "nausea and vomiting.   Genitourinary: Negative for dysuria.   Musculoskeletal: Negative for myalgias.   Skin: Negative for rash.   Neurological: Negative for headaches.       Medications:    • albuterol  • albuterol Aers  • Alcohol Swabs  • Blood Glucose Meter Kit  • Blood Glucose Test Strips  • DIGESTIVE ENZYMES PO  • Lancets  • levothyroxine Tabs  • liothyronine Tabs  • MAGNESIUM PO  • montelukast Tabs  • predniSONE Tabs  • PROBIOTIC PO  • Spacer/Aero-Hold Chamber Bags Misc  • Vyvanse Caps    Allergies: Eggs, Shellfish allergy, Wheat bran, Ibuprofen, and Tree nuts food allergy    Problem List: Juanita Montero has Encounter for initial prescription of contraceptive pills; Attention deficit hyperactivity disorder; Acquired hypothyroidism; Anxiety; and Mild intermittent asthma without complication on their problem list.    Surgical History:  Past Surgical History:   Procedure Laterality Date   • MAMMOPLASTY AUGMENTATION  2011       Past Social Hx: Juanita Montero  reports that she has never smoked. She has never used smokeless tobacco. She reports current alcohol use. She reports current drug use. Drugs: Marijuana and Oral.     Past Family Hx:  Juanita Montero family history includes No Known Problems in her father; Osteoporosis in her mother.     Problem list, medications, and allergies reviewed by myself today in Epic.     Objective:     BP (!) 90/58   Pulse 73   Temp 36.8 °C (98.3 °F) (Temporal)   Resp 12   Ht 1.765 m (5' 9.5\")   Wt 68 kg (150 lb)   LMP 12/28/2020 (Exact Date)   SpO2 96%   Breastfeeding No   BMI 21.83 kg/m²     Physical Exam  Vitals signs reviewed.   Constitutional:       Appearance: Normal appearance.   HENT:      Head: Normocephalic and atraumatic.      Right Ear: External ear normal.      Left Ear: External ear normal.      Nose: Nose normal.      Mouth/Throat:      Mouth: Mucous membranes are moist.   Eyes:      Conjunctiva/sclera: Conjunctivae normal. "   Cardiovascular:      Rate and Rhythm: Normal rate.   Pulmonary:      Effort: Pulmonary effort is normal.      Comments: No adventitious lung sounds heard, thorough auscultation did not reveal any wheezing, rales or crackles.  Chest:      Chest wall: No tenderness.   Skin:     General: Skin is warm and dry.      Capillary Refill: Capillary refill takes less than 2 seconds.   Neurological:      Mental Status: She is alert and oriented to person, place, and time.         Assessment/Plan:     Diagnosis and associated orders:     1. Mild intermittent asthma without complication  predniSONE (DELTASONE) 10 MG Tab    albuterol (ACCUNEB) 0.63 MG/3ML nebulizer solution    montelukast (SINGULAIR) 10 MG Tab    Spacer/Aero-Hold Chamber Bags Misc      Comments/MDM:     • Discussed the black box box warnings as well as risk versus benefits of Singulair, patient has tolerated this in the past.  • We will treat with asthma exacerbation with steroids, beta agonist.  The patient has albuterol inhaler and I confirmed that she has 1 refill available at the pharmacy.  She has a nebulizer at home and so I will send nebulizer refills.  She also has an appoint with her primary care provider in 2 weeks who can continue to monitor her and see how she is doing.  She does not have a spacer for her current albuterol inhaler so I sent a medication prescription for this to the pharmacy as well.  We discussed ER precautions.  I believe that she has some underlying lung inflammation after her viral Covid infection which is been irritated further by environmental allergens.  With cessation of these allergens and treatment I believe she will do well.  She demonstrated good verbal understanding and I believe her to be a reliable candidate for outpatient follow-up         Differential diagnosis, natural history, supportive care, and indications for immediate follow-up discussed.    Advised the patient to follow-up with the primary care physician for  recheck, reevaluation, and consideration of further management.    Please note that this dictation was created using voice recognition software. I have made a reasonable attempt to correct obvious errors, but I expect that there are errors of grammar and possibly content that I did not discover before finalizing the note.    This note was electronically signed by Shukri Carter PA-C

## 2021-02-01 ENCOUNTER — TELEMEDICINE (OUTPATIENT)
Dept: MEDICAL GROUP | Facility: IMAGING CENTER | Age: 36
End: 2021-02-01
Payer: COMMERCIAL

## 2021-02-01 VITALS — HEIGHT: 70 IN | TEMPERATURE: 97.8 F | HEART RATE: 72 BPM | BODY MASS INDEX: 21.47 KG/M2 | WEIGHT: 150 LBS

## 2021-02-01 DIAGNOSIS — J45.20 MILD INTERMITTENT ASTHMA WITHOUT COMPLICATION: ICD-10-CM

## 2021-02-01 PROCEDURE — 99213 OFFICE O/P EST LOW 20 MIN: CPT | Mod: 95,CR | Performed by: FAMILY MEDICINE

## 2021-02-01 RX ORDER — MONTELUKAST SODIUM 10 MG/1
10 TABLET ORAL DAILY
Qty: 90 TAB | Refills: 0 | Status: SHIPPED
Start: 2021-02-01 | End: 2021-04-13

## 2021-02-01 ASSESSMENT — FIBROSIS 4 INDEX: FIB4 SCORE: 0.87

## 2021-02-01 ASSESSMENT — PATIENT HEALTH QUESTIONNAIRE - PHQ9: CLINICAL INTERPRETATION OF PHQ2 SCORE: 0

## 2021-02-01 ASSESSMENT — PAIN SCALES - GENERAL: PAINLEVEL: NO PAIN

## 2021-03-01 RX ORDER — LEVOTHYROXINE SODIUM 0.03 MG/1
25 TABLET ORAL
Qty: 90 TABLET | Refills: 3 | Status: SHIPPED | OUTPATIENT
Start: 2021-03-01 | End: 2022-01-27

## 2021-03-12 ENCOUNTER — PATIENT MESSAGE (OUTPATIENT)
Dept: MEDICAL GROUP | Facility: IMAGING CENTER | Age: 36
End: 2021-03-12

## 2021-03-12 DIAGNOSIS — E03.9 ACQUIRED HYPOTHYROIDISM: ICD-10-CM

## 2021-03-13 RX ORDER — LIOTHYRONINE SODIUM 5 UG/1
5 TABLET ORAL DAILY
Qty: 90 TABLET | Refills: 3 | Status: SHIPPED | OUTPATIENT
Start: 2021-03-13 | End: 2022-01-24

## 2021-04-13 ENCOUNTER — TELEMEDICINE (OUTPATIENT)
Dept: MEDICAL GROUP | Facility: IMAGING CENTER | Age: 36
End: 2021-04-13
Payer: COMMERCIAL

## 2021-04-13 VITALS — HEART RATE: 64 BPM | HEIGHT: 70 IN | TEMPERATURE: 97.5 F | BODY MASS INDEX: 22.05 KG/M2 | WEIGHT: 154 LBS

## 2021-04-13 DIAGNOSIS — N93.0 POSTCOITAL BLEEDING: ICD-10-CM

## 2021-04-13 DIAGNOSIS — Z11.4 ENCOUNTER FOR SCREENING FOR HIV: ICD-10-CM

## 2021-04-13 DIAGNOSIS — E55.9 VITAMIN D DEFICIENCY: ICD-10-CM

## 2021-04-13 DIAGNOSIS — B97.7 HPV (HUMAN PAPILLOMA VIRUS) INFECTION: ICD-10-CM

## 2021-04-13 DIAGNOSIS — E03.9 ACQUIRED HYPOTHYROIDISM: ICD-10-CM

## 2021-04-13 DIAGNOSIS — R94.4 DECREASED GFR: ICD-10-CM

## 2021-04-13 DIAGNOSIS — N91.2 AMENORRHEA: ICD-10-CM

## 2021-04-13 DIAGNOSIS — B37.31 CANDIDIASIS, VAGINA: ICD-10-CM

## 2021-04-13 PROCEDURE — 99214 OFFICE O/P EST MOD 30 MIN: CPT | Mod: 95,CR | Performed by: FAMILY MEDICINE

## 2021-04-13 RX ORDER — CETIRIZINE HYDROCHLORIDE 10 MG/1
10 TABLET ORAL DAILY
COMMUNITY
End: 2021-05-27

## 2021-04-13 RX ORDER — FLUCONAZOLE 150 MG/1
150 TABLET ORAL DAILY
Qty: 90 TABLET | Refills: 0 | Status: SHIPPED | OUTPATIENT
Start: 2021-04-13 | End: 2022-02-23

## 2021-04-13 ASSESSMENT — PAIN SCALES - GENERAL: PAINLEVEL: NO PAIN

## 2021-04-13 ASSESSMENT — FIBROSIS 4 INDEX: FIB4 SCORE: 0.89

## 2021-04-13 NOTE — PROGRESS NOTES
Telemedicine Visit: New Patient     This encounter was conducted via Zoom.   Verbal consent was obtained. Patient's identity was verified. Patient aware this will be   billed the same as an in person evaluation.  Patient in home, I am in office at [address] [place] [address]  Subjective:     Chief Complaint   Patient presents with   • Thyroid Problem       Juanita Montero is a 36 y.o. female with history of ADHD, hypothyroidism, anxiety, mild intermittent asthma on virtual visit to discuss thyroid problems. Periods have been irregular since she had to take steroids due to covid infection. lmp feb 7th. Regular periods prior to this. She was also having depression and some suicidal thoughts without intention. Though she stopped the Singulair. Suicidal thoughts stopped.     Using ph balance for repeat yeast infections. She feels that she has another yeast infection having discharge white without smell none itchy some pelvic pain.  She had an alcoholic drink and a cookie and then woke up with a yeast infection. She has had to complete a '6 week' course of diflucan in the past for similar scenario.       ROS  See HPI  Constitutional: Negative for fever, chills and malaise/fatigue.   HENT: Negative for congestion, itchy watery eyes  Eyes: Negative for pain or sudden changes in vision  Respiratory: Negative for cough and shortness of breath.    Cardiovascular: Negative for leg swelling or chest pain  Gastrointestinal: Negative for nausea, vomiting, abdominal pain and diarrhea.   Genitourinary: Negative for dysuria and hematuria.   Skin: Negative for rash or concerning moles   Neurological: Negative for dizziness, focal weakness   Psychiatric/Behavioral: Negative for depression.  The patient is not nervous/anxious.    Musculoskeletal: no weakness or joint stiffness    Allergies   Allergen Reactions   • Eggs Anaphylaxis   • Shellfish Allergy Anaphylaxis   • Wheat Bran Anaphylaxis   • Ibuprofen Anaphylaxis   •  Tree Nuts Food Allergy Shortness of Breath       Current medicines (including changes today)  Current Outpatient Medications   Medication Sig Dispense Refill   • cetirizine (ZYRTEC) 10 MG Tab Take 10 mg by mouth every day.     • fluconazole (DIFLUCAN) 150 MG tablet Take 1 tablet by mouth every day. 90 tablet 0   • liothyronine (CYTOMEL) 5 MCG Tab Take 1 tablet by mouth every day. 90 tablet 3   • levothyroxine (SYNTHROID) 25 MCG Tab Take 1 tablet by mouth Every morning on an empty stomach. 90 tablet 3   • SYNTHROID 200 MCG Tab TAKE ONE TABLET BY MOUTH EVERY MORNING ON AN EMPTY STOMACH *SYNTHROID* 90 Tab 3   • VYVANSE 30 MG capsule Take 40 mg by mouth every morning.     • albuterol 108 (90 Base) MCG/ACT Aero Soln inhalation aerosol Inhale 2 Puffs by mouth every 6 hours as needed for Shortness of Breath. 8.5 g 1   • MAGNESIUM PO Take  by mouth.     • DIGESTIVE ENZYMES PO Take  by mouth.     • Probiotic Product (PROBIOTIC PO) Take  by mouth.     • Spacer/Aero-Hold Chamber Bags Misc Dispense 1 spacer for albuterol MDI. 1 Each 0   • Blood Glucose Meter Kit Test blood sugar as recommended by provider when near-syncopal for hypoglycemia. One Touch Verio blood glucose monitoring kit. 1 Kit 0   • Blood Glucose Test Strips Use one One Touch Verio Flex strip to test blood sugar once daily . 30 Each 1   • Lancets Use one One Touch Verio Flex lancet to test blood sugar once daily . 100 Each 0   • Alcohol Swabs Wipe site with prep pad prior to injection. 100 Each 0     No current facility-administered medications for this visit.       She  has a past medical history of ADHD, Allergy, Anxiety, Depression, Hashimoto's disease, Hyperthyroidism, and Restless leg syndrome.  She  has a past surgical history that includes mammoplasty augmentation (2011).      Family History   Problem Relation Age of Onset   • Osteoporosis Mother    • No Known Problems Father      Family Status   Relation Name Status   • Mo  Alive   • Fa  Alive  "      Patient Active Problem List    Diagnosis Date Noted   • Mild intermittent asthma without complication 06/22/2020   • Anxiety 12/05/2019   • Acquired hypothyroidism 08/29/2016   • Encounter for initial prescription of contraceptive pills 07/13/2016   • Attention deficit hyperactivity disorder 07/13/2016          Objective:   Vitals obtained by patient:  Pulse 64   Temp 36.4 °C (97.5 °F)   Ht 1.778 m (5' 10\")   Wt 69.9 kg (154 lb)   LMP 02/07/2021   BMI 22.10 kg/m²     Physical Exam:  Constitutional: Alert, no distress, well-groomed.  Skin: No rashes in visible areas.  Eye: Round. Conjunctiva clear, lids normal. No icterus.   ENMT: Lips pink without lesions, good dentition, moist mucous membranes. Phonation normal.  Neck: No masses, no thyromegaly. Moves freely without pain.  CV: Pulse as reported by patient  Respiratory: Unlabored respiratory effort, no cough or audible wheeze  Psych: Alert and oriented x3, normal affect and mood.   Neuro: symmetric face. Alert and oriented. Follows commands. No aphasia or dysarthria.    Labs:  No visits with results within 1 Month(s) from this visit.   Latest known visit with results is:   Hospital Outpatient Visit on 12/20/2020   Component Date Value Ref Range Status   • SARS-CoV-2 Source 12/20/2020 Nasal Swab   Final   • SARS-CoV-2 by PCR 12/20/2020 DETECTED*  Final    Comment: PATIENTS: Important information regarding your results and instructions can  be found at [email or web address]   \"After your  Covid-19 Test\"  **The [facility] COVID-19 SARS-CoV-2 test has been made available for use under  the Emergency Use Authorization (EUA) only.     • COVID Order Status 12/20/2020 Received   Final    Comment: The order for SARS CoV-2 testing has been received by the  Laboratory. This result is neither positive nor negative.  Final results of testing will report in 24-48 hours, separately.         Imaging:   No results found.    Assessment and Plan: "   The following treatment plan was discussed:     Problem List Items Addressed This Visit     Acquired hypothyroidism    Relevant Orders    TSH WITH REFLEX TO FT4      Other Visit Diagnoses     Decreased GFR        Relevant Orders    ESTIMATED GFR    Comp Metabolic Panel    Vitamin D deficiency        Relevant Orders    VITAMIN D,25 HYDROXY    Encounter for screening for HIV        Relevant Orders    HIV AG/AB COMBO ASSAY SCREENING    Candidiasis, vagina        Relevant Medications    fluconazole (DIFLUCAN) 150 MG tablet    HPV (human papilloma virus) infection        Relevant Orders    REFERRAL TO OB/GYN    Postcoital bleeding        Relevant Orders    US-PELVIC COMPLETE (TRANSABDOMINAL/TRANSVAGINAL) (COMBO)    Amenorrhea        Relevant Orders    HCG QUAL SERUM          Follow-up: Return if symptoms worsen or fail to improve.        Portions of this note may be dictated using Dragon NaturallySpeaking voice recognition software.  Variances in spelling and vocabulary are possible and unintentional.  Not all areas may be caught/corrected.  Please notify me if any discrepancies are noted or if the meaning of any statement is not correct/clear.

## 2021-05-04 ENCOUNTER — HOSPITAL ENCOUNTER (OUTPATIENT)
Dept: RADIOLOGY | Facility: MEDICAL CENTER | Age: 36
End: 2021-05-04
Attending: FAMILY MEDICINE
Payer: COMMERCIAL

## 2021-05-04 DIAGNOSIS — N93.0 POSTCOITAL BLEEDING: ICD-10-CM

## 2021-05-04 PROCEDURE — 76830 TRANSVAGINAL US NON-OB: CPT

## 2021-05-10 ENCOUNTER — HOSPITAL ENCOUNTER (OUTPATIENT)
Dept: LAB | Facility: MEDICAL CENTER | Age: 36
End: 2021-05-10
Attending: FAMILY MEDICINE
Payer: COMMERCIAL

## 2021-05-10 DIAGNOSIS — R94.4 DECREASED GFR: ICD-10-CM

## 2021-05-10 DIAGNOSIS — Z11.4 ENCOUNTER FOR SCREENING FOR HIV: ICD-10-CM

## 2021-05-10 DIAGNOSIS — N91.2 AMENORRHEA: ICD-10-CM

## 2021-05-10 DIAGNOSIS — E03.9 ACQUIRED HYPOTHYROIDISM: ICD-10-CM

## 2021-05-10 DIAGNOSIS — E55.9 VITAMIN D DEFICIENCY: ICD-10-CM

## 2021-05-10 LAB
ALBUMIN SERPL BCP-MCNC: 4.2 G/DL (ref 3.2–4.9)
ALBUMIN/GLOB SERPL: 1.6 G/DL
ALP SERPL-CCNC: 68 U/L (ref 30–99)
ALT SERPL-CCNC: 29 U/L (ref 2–50)
ANION GAP SERPL CALC-SCNC: 9 MMOL/L (ref 7–16)
AST SERPL-CCNC: 34 U/L (ref 12–45)
BILIRUB SERPL-MCNC: 0.5 MG/DL (ref 0.1–1.5)
BUN SERPL-MCNC: 24 MG/DL (ref 8–22)
CALCIUM SERPL-MCNC: 9.4 MG/DL (ref 8.4–10.2)
CHLORIDE SERPL-SCNC: 102 MMOL/L (ref 96–112)
CO2 SERPL-SCNC: 28 MMOL/L (ref 20–33)
CREAT SERPL-MCNC: 1.08 MG/DL (ref 0.5–1.4)
GLOBULIN SER CALC-MCNC: 2.6 G/DL (ref 1.9–3.5)
GLUCOSE SERPL-MCNC: 72 MG/DL (ref 65–99)
HCG SERPL QL: NEGATIVE
POTASSIUM SERPL-SCNC: 4.6 MMOL/L (ref 3.6–5.5)
PROT SERPL-MCNC: 6.8 G/DL (ref 6–8.2)
SODIUM SERPL-SCNC: 139 MMOL/L (ref 135–145)
TSH SERPL DL<=0.005 MIU/L-ACNC: 1.55 UIU/ML (ref 0.38–5.33)

## 2021-05-10 PROCEDURE — 36415 COLL VENOUS BLD VENIPUNCTURE: CPT

## 2021-05-10 PROCEDURE — 84443 ASSAY THYROID STIM HORMONE: CPT

## 2021-05-10 PROCEDURE — 87389 HIV-1 AG W/HIV-1&-2 AB AG IA: CPT

## 2021-05-10 PROCEDURE — 82306 VITAMIN D 25 HYDROXY: CPT

## 2021-05-10 PROCEDURE — 84703 CHORIONIC GONADOTROPIN ASSAY: CPT

## 2021-05-10 PROCEDURE — 80053 COMPREHEN METABOLIC PANEL: CPT

## 2021-05-11 LAB — HIV 1+2 AB+HIV1 P24 AG SERPL QL IA: NORMAL

## 2021-05-12 LAB — 25(OH)D3 SERPL-MCNC: 33 NG/ML (ref 30–80)

## 2021-05-27 ENCOUNTER — OFFICE VISIT (OUTPATIENT)
Dept: URGENT CARE | Facility: CLINIC | Age: 36
End: 2021-05-27
Payer: COMMERCIAL

## 2021-05-27 ENCOUNTER — HOSPITAL ENCOUNTER (OUTPATIENT)
Facility: MEDICAL CENTER | Age: 36
End: 2021-05-27
Attending: NURSE PRACTITIONER
Payer: COMMERCIAL

## 2021-05-27 VITALS
SYSTOLIC BLOOD PRESSURE: 112 MMHG | RESPIRATION RATE: 12 BRPM | HEIGHT: 70 IN | OXYGEN SATURATION: 100 % | TEMPERATURE: 98.7 F | DIASTOLIC BLOOD PRESSURE: 76 MMHG | HEART RATE: 60 BPM | WEIGHT: 154 LBS | BODY MASS INDEX: 22.05 KG/M2

## 2021-05-27 DIAGNOSIS — R30.0 DYSURIA: ICD-10-CM

## 2021-05-27 LAB
APPEARANCE UR: CLEAR
BILIRUB UR STRIP-MCNC: NEGATIVE MG/DL
CANDIDA DNA VAG QL PROBE+SIG AMP: NEGATIVE
COLOR UR AUTO: YELLOW
G VAGINALIS DNA VAG QL PROBE+SIG AMP: NEGATIVE
GLUCOSE UR STRIP.AUTO-MCNC: NEGATIVE MG/DL
INT CON NEG: NORMAL
INT CON POS: NORMAL
KETONES UR STRIP.AUTO-MCNC: NEGATIVE MG/DL
LEUKOCYTE ESTERASE UR QL STRIP.AUTO: NEGATIVE
NITRITE UR QL STRIP.AUTO: NEGATIVE
PH UR STRIP.AUTO: 7 [PH] (ref 5–8)
POC URINE PREGNANCY TEST: NEGATIVE
PROT UR QL STRIP: NEGATIVE MG/DL
RBC UR QL AUTO: NEGATIVE
SP GR UR STRIP.AUTO: 1.01
T VAGINALIS DNA VAG QL PROBE+SIG AMP: NEGATIVE
UROBILINOGEN UR STRIP-MCNC: 0.3 MG/DL

## 2021-05-27 PROCEDURE — 87510 GARDNER VAG DNA DIR PROBE: CPT

## 2021-05-27 PROCEDURE — 87660 TRICHOMONAS VAGIN DIR PROBE: CPT

## 2021-05-27 PROCEDURE — 81002 URINALYSIS NONAUTO W/O SCOPE: CPT | Performed by: NURSE PRACTITIONER

## 2021-05-27 PROCEDURE — 87480 CANDIDA DNA DIR PROBE: CPT

## 2021-05-27 PROCEDURE — 81025 URINE PREGNANCY TEST: CPT | Performed by: NURSE PRACTITIONER

## 2021-05-27 PROCEDURE — 99213 OFFICE O/P EST LOW 20 MIN: CPT | Performed by: NURSE PRACTITIONER

## 2021-05-27 RX ORDER — LISDEXAMFETAMINE DIMESYLATE 40 MG/1
CAPSULE ORAL
COMMUNITY
Start: 2021-03-03 | End: 2021-12-28

## 2021-05-27 RX ORDER — LISDEXAMFETAMINE DIMESYLATE 50 MG
CAPSULE ORAL
COMMUNITY
Start: 2021-04-28 | End: 2021-12-28

## 2021-05-27 ASSESSMENT — ENCOUNTER SYMPTOMS
NAUSEA: 0
ABDOMINAL PAIN: 0
FLANK PAIN: 0
FEVER: 0
VOMITING: 0
BLOOD IN STOOL: 0

## 2021-05-27 ASSESSMENT — FIBROSIS 4 INDEX: FIB4 SCORE: 0.88

## 2021-05-27 NOTE — PATIENT INSTRUCTIONS
Dysuria  Dysuria is pain or discomfort while urinating. The pain or discomfort may be felt in the part of your body that drains urine from the bladder (urethra) or in the surrounding tissue of the genitals. The pain may also be felt in the groin area, lower abdomen, or lower back. You may have to urinate frequently or have the sudden feeling that you have to urinate (urgency). Dysuria can affect both men and women, but it is more common in women.  Dysuria can be caused by many different things, including:  · Urinary tract infection.  · Kidney stones or bladder stones.  · Certain sexually transmitted infections (STIs), such as chlamydia.  · Dehydration.  · Inflammation of the tissues of the vagina.  · Use of certain medicines.  · Use of certain soaps or scented products that cause irritation.  Follow these instructions at home:  General instructions  · Watch your condition for any changes.  · Urinate often. Avoid holding urine for long periods of time.  · After a bowel movement or urination, women should cleanse from front to back, using each tissue only once.  · Urinate after sexual intercourse.  · Keep all follow-up visits as told by your health care provider. This is important.  · If you had any tests done to find the cause of dysuria, it is up to you to get your test results. Ask your health care provider, or the department that is doing the test, when your results will be ready.  Eating and drinking    · Drink enough fluid to keep your urine pale yellow.  · Avoid caffeine, tea, and alcohol. They can irritate the bladder and make dysuria worse. In men, alcohol may irritate the prostate.  Medicines  · Take over-the-counter and prescription medicines only as told by your health care provider.  · If you were prescribed an antibiotic medicine, take it as told by your health care provider. Do not stop taking the antibiotic even if you start to feel better.  Contact a health care provider if:  · You have a  fever.  · You develop pain in your back or sides.  · You have nausea or vomiting.  · You have blood in your urine.  · You are not urinating as often as you usually do.  Get help right away if:  · Your pain is severe and not relieved with medicines.  · You cannot eat or drink without vomiting.  · You are confused.  · You have a rapid heartbeat while at rest.  · You have shaking or chills.  · You feel extremely weak.  Summary  · Dysuria is pain or discomfort while urinating. Many different conditions can lead to dysuria.  · If you have dysuria, you may have to urinate frequently or have the sudden feeling that you have to urinate (urgency).  · Watch your condition for any changes. Keep all follow-up visits as told by your health care provider.  · Make sure that you urinate often and drink enough fluid to keep your urine pale yellow.  This information is not intended to replace advice given to you by your health care provider. Make sure you discuss any questions you have with your health care provider.  Document Released: 09/15/2005 Document Revised: 11/30/2018 Document Reviewed: 10/04/2018  [employer] Patient Education © 2020 [employer]      Interstitial Cystitis    Interstitial cystitis is inflammation of the bladder. This may cause pain in the bladder area as well as a frequent and urgent need to urinate. The bladder is a hollow organ in the lower part of the abdomen. It stores urine after the urine is made in the kidneys.  The severity of interstitial cystitis can vary from person to person. You may have flare-ups, and then your symptoms may go away for a while. For many people, it becomes a long-term (chronic) problem.  What are the causes?  The cause of this condition is not known.  What increases the risk?  The following factors may make you more likely to develop this condition:  · You are female.  · You have fibromyalgia.  · You have irritable bowel syndrome (IBS).  · You have endometriosis.  This condition  may be aggravated by:  · Stress.  · Smoking.  · Spicy foods.  What are the signs or symptoms?  Symptoms of interstitial cystitis vary, and they can change over time. Symptoms may include:  · Discomfort or pain in the bladder area, which is in the lower abdomen. Pain can range from mild to severe. The pain may change in intensity as the bladder fills with urine or as it empties.  · Pain in the pelvic area, between the hip bones.  · An urgent need to urinate.  · Frequent urination.  · Pain during urination.  · Pain during sex.  · Blood in the urine.  For women, symptoms often get worse during menstruation.  How is this diagnosed?  This condition is diagnosed based on your symptoms, your medical history, and a physical exam. You may have tests to rule out other conditions, such as:  · Urine tests.  · Cystoscopy. For this test, a tool similar to a very thin telescope is used to look into your bladder.  · Biopsy. This involves taking a sample of tissue from the bladder to be examined under a microscope.  How is this treated?  There is no cure for this condition, but treatment can help you control your symptoms. Work closely with your health care provider to find the most effective treatments for you. Treatment options may include:  · Medicines to relieve pain and reduce how often you feel the need to urinate.  · Learning ways to control when you urinate (bladder training).  · Lifestyle changes, such as changing your diet or taking steps to control stress.  · Using a device that provides electrical stimulation to your nerves, which can relieve pain (neuromodulation therapy). The device is placed on your back, where it blocks the nerves that cause you to feel pain in your bladder area.  · A procedure that stretches your bladder by filling it with air or fluid.  · Surgery. This is rare. It is only done for extreme cases, if other treatments do not help.  Follow these instructions at home:  Bladder training    · Use bladder  training techniques as directed. Techniques may include:  ? Urinating at scheduled times.  ? Training yourself to delay urination.  ? Doing exercises (Kegel exercises) to strengthen the muscles that control urine flow.  · Keep a bladder diary.  ? Write down the times that you urinate and any symptoms that you have. This can help you find out which foods, liquids, or activities make your symptoms worse.  ? Use your bladder diary to schedule bathroom trips. If you are away from home, plan to be near a bathroom at each of your scheduled times.  · Make sure that you urinate just before you leave the house and just before you go to bed.  Eating and drinking  · Make dietary changes as recommended by your health care provider. You may need to avoid:  ? Spicy foods.  ? Foods that contain a lot of potassium.  · Limit your intake of beverages that make you need to urinate. These include:  ? Caffeinated beverages like soda, coffee, and tea.  ? Alcohol.  General instructions  · Take over-the-counter and prescription medicines only as told by your health care provider.  · Do not drink alcohol.  · You can try a warm or cool compress over your bladder for comfort.  · Avoid wearing tight clothing.  · Do not use any products that contain nicotine or tobacco, such as cigarettes and e-cigarettes. If you need help quitting, ask your health care provider.  · Keep all follow-up visits as told by your health care provider. This is important.  Contact a health care provider if you have:  · Symptoms that do not get better with treatment.  · Pain or discomfort that gets worse.  · More frequent urges to urinate.  · A fever.  Get help right away if:  · You have no control over when you urinate.  Summary  · Interstitial cystitis is inflammation of the bladder.  · This condition may cause pain in the bladder area as well as a frequent and urgent need to urinate.  · You may have flare-ups of the condition, and then it may go away for a while. For  many people, it becomes a long-term (chronic) problem.  · There is no cure for interstitial cystitis, but treatment methods are available to control your symptoms.  This information is not intended to replace advice given to you by your health care provider. Make sure you discuss any questions you have with your health care provider.  Document Released: 08/18/2005 Document Revised: 11/30/2018 Document Reviewed: 11/12/2018  [employer] Patient Education © 2020 [employer]

## 2021-05-27 NOTE — PROGRESS NOTES
Subjective:     Juanita Montero is a 36 y.o. female who presents for Dysuria (x 2 days, frequency, urgency, painful urination, pelvic pain after urination)      Intermittent dysuria. Hx of BV. No current vaginitis symptoms. States she is sensitive to different detergents. Painful intercourse. LMP 5/7, irregular. Senstive. Occasianl urgency. Hx of recurrent pelvic symptoms. Is unable to see an OB/GYN, will be switching insurances. Hx of lower GFR, concerned with possible protein in urine.       Dysuria   This is a new problem. The problem has been waxing and waning. There has been no fever. Pertinent negatives include no discharge, flank pain, hematuria, nausea or vomiting. She has tried nothing for the symptoms.       Past Medical History:   Diagnosis Date   • ADHD    • Allergy    • Anxiety    • Depression    • Hashimoto's disease    • Hyperthyroidism    • Restless leg syndrome        Past Surgical History:   Procedure Laterality Date   • MAMMOPLASTY AUGMENTATION  2011       Social History     Socioeconomic History   • Marital status:      Spouse name: Not on file   • Number of children: Not on file   • Years of education: Not on file   • Highest education level: Some college, no degree   Occupational History   • Not on file   Tobacco Use   • Smoking status: Never Smoker   • Smokeless tobacco: Never Used   Vaping Use   • Vaping Use: Never used   Substance and Sexual Activity   • Alcohol use: Yes     Comment: rarely   • Drug use: Not Currently     Types: Marijuana, Oral     Comment:  a few times a month/occ   • Sexual activity: Yes   Other Topics Concern   • Not on file   Social History Narrative   • Not on file     Social Determinants of Health     Financial Resource Strain: Low Risk    • Difficulty of Paying Living Expenses: Not hard at all   Food Insecurity: No Food Insecurity   • Worried About Running Out of Food in the Last Year: Never true   • Ran Out of Food in the Last Year: Never true  "  Transportation Needs: No Transportation Needs   • Lack of Transportation (Medical): No   • Lack of Transportation (Non-Medical): No   Physical Activity: Sufficiently Active   • Days of Exercise per Week: 7 days   • Minutes of Exercise per Session: 50 min   Stress: No Stress Concern Present   • Feeling of Stress : Only a little   Social Connections: Moderately Isolated   • Frequency of Communication with Friends and Family: Three times a week   • Frequency of Social Gatherings with Friends and Family: Once a week   • Attends Restorationist Services: Never   • Active Member of Clubs or Organizations: No   • Attends Club or Organization Meetings: Never   • Marital Status:    Intimate Partner Violence:    • Fear of Current or Ex-Partner:    • Emotionally Abused:    • Physically Abused:    • Sexually Abused:         Family History   Problem Relation Age of Onset   • Osteoporosis Mother    • No Known Problems Father         Allergies   Allergen Reactions   • Eggs Anaphylaxis   • Shellfish Allergy Anaphylaxis   • Wheat Bran Anaphylaxis   • Ibuprofen Anaphylaxis   • Tree Nuts Food Allergy Shortness of Breath       Review of Systems   Constitutional: Negative for fever.   Gastrointestinal: Negative for abdominal pain, blood in stool, nausea and vomiting.   Genitourinary: Positive for dysuria. Negative for flank pain and hematuria.   All other systems reviewed and are negative.       Objective:   /76 (BP Location: Left arm, Patient Position: Sitting, BP Cuff Size: Adult)   Pulse 60   Temp 37.1 °C (98.7 °F) (Temporal)   Resp 12   Ht 1.778 m (5' 10\")   Wt 69.9 kg (154 lb)   LMP 05/07/2021   SpO2 100%   BMI 22.10 kg/m²     Physical Exam  Vitals reviewed.   Constitutional:       General: She is not in acute distress.     Appearance: She is well-developed.   HENT:      Head: Normocephalic and atraumatic.      Right Ear: External ear normal.      Left Ear: External ear normal.      Nose: Nose normal.   Eyes:      " Conjunctiva/sclera: Conjunctivae normal.   Cardiovascular:      Rate and Rhythm: Normal rate.   Pulmonary:      Effort: Pulmonary effort is normal.   Abdominal:      General: Abdomen is flat. Bowel sounds are normal. There is no distension.      Palpations: Abdomen is soft.      Tenderness: There is no abdominal tenderness. There is no right CVA tenderness, left CVA tenderness or guarding.   Musculoskeletal:         General: Normal range of motion.      Cervical back: Normal range of motion.   Skin:     General: Skin is warm and dry.      Findings: No rash.   Neurological:      General: No focal deficit present.      Mental Status: She is alert and oriented to person, place, and time.      GCS: GCS eye subscore is 4. GCS verbal subscore is 5. GCS motor subscore is 6.   Psychiatric:         Mood and Affect: Mood normal.         Speech: Speech normal.         Behavior: Behavior normal.         Thought Content: Thought content normal.         Judgment: Judgment normal.         Assessment/Plan:   1. Dysuria  - POCT Urinalysis  - POCT Pregnancy  - VAGINAL PATHOGENS DNA PANEL; Future  - REFERRAL TO GYNECOLOGY  -Oral Hydration: Drink plenty of water.  -Follow up with PCP.  Results for orders placed or performed in visit on 05/27/21   POCT Urinalysis   Result Value Ref Range    POC Color yellow Negative    POC Appearance clear Negative    POC Leukocyte Esterase negative Negative    POC Nitrites negative Negative    POC Urobiligen 0.3 Negative (0.2) mg/dL    POC Protein negative Negative mg/dL    POC Urine PH 7.0 5.0 - 8.0    POC Blood negative Negative    POC Specific Gravity 1.010 <1.005 - >1.030    POC Ketones negative Negative mg/dL    POC Bilirubin negative Negative mg/dL    POC Glucose negative Negative mg/dL   POCT Pregnancy   Result Value Ref Range    POC Urine Pregnancy Test Negative Negative    Internal Control Positive Valid     Internal Control Negative Valid    Follow up urgently for new or persistent abdominal  pain, flank pain, difficulty with urination, fevers, vomiting, weakness, tachycardia, or any other concerns. Patient inquired about changing OB/GYN referral to GYN. Discussed r/o BV with intermittent dysuria. Reviewed 5/10 labs, GFR 57.    Differential diagnosis, natural history, supportive care, and indications for immediate follow-up discussed.

## 2021-06-04 ENCOUNTER — TELEPHONE (OUTPATIENT)
Dept: MEDICAL GROUP | Facility: IMAGING CENTER | Age: 36
End: 2021-06-04

## 2021-06-04 DIAGNOSIS — T78.2XXD ANAPHYLAXIS, SUBSEQUENT ENCOUNTER: ICD-10-CM

## 2021-06-04 RX ORDER — EPINEPHRINE 0.3 MG/.3ML
INJECTION SUBCUTANEOUS
Qty: 1 EACH | Refills: 0 | Status: SHIPPED | OUTPATIENT
Start: 2021-06-04 | End: 2022-08-17 | Stop reason: SDUPTHER

## 2021-06-04 NOTE — TELEPHONE ENCOUNTER
Refill request received for epinephrine pen 0.3 mg auto inject.   Epi pen is not listed on patients medication list.

## 2021-06-04 NOTE — TELEPHONE ENCOUNTER
Epi pen sent. Of note, reached out to patient personally to schedule assessment of reduced gfr. Patient will be switching providers due to insurance. She is aware that I can get her workup at least started and assess reason. I recommend patient see me for this for further evaluation.

## 2021-12-28 RX ORDER — LEVOTHYROXINE SODIUM 200 MCG
TABLET ORAL
Qty: 90 TABLET | Refills: 1 | Status: SHIPPED
Start: 2021-12-28 | End: 2022-06-15 | Stop reason: DRUGHIGH

## 2021-12-28 RX ORDER — DEXTROAMPHETAMINE SACCHARATE, AMPHETAMINE ASPARTATE, DEXTROAMPHETAMINE SULFATE AND AMPHETAMINE SULFATE 2.5; 2.5; 2.5; 2.5 MG/1; MG/1; MG/1; MG/1
TABLET ORAL
COMMUNITY
Start: 2021-10-29 | End: 2023-02-08 | Stop reason: SDUPTHER

## 2021-12-28 RX ORDER — DEXTROAMPHETAMINE SACCHARATE, AMPHETAMINE ASPARTATE MONOHYDRATE, DEXTROAMPHETAMINE SULFATE AND AMPHETAMINE SULFATE 5; 5; 5; 5 MG/1; MG/1; MG/1; MG/1
CAPSULE, EXTENDED RELEASE ORAL
COMMUNITY
Start: 2021-11-02 | End: 2023-02-08 | Stop reason: SDUPTHER

## 2021-12-28 RX ORDER — MONTELUKAST SODIUM 10 MG/1
TABLET ORAL
COMMUNITY
Start: 2021-11-30 | End: 2022-02-02

## 2022-01-24 DIAGNOSIS — E03.9 ACQUIRED HYPOTHYROIDISM: ICD-10-CM

## 2022-01-24 RX ORDER — LIOTHYRONINE SODIUM 5 UG/1
TABLET ORAL
Qty: 30 TABLET | Refills: 0 | Status: SHIPPED
Start: 2022-01-24 | End: 2022-04-28

## 2022-01-24 NOTE — TELEPHONE ENCOUNTER
Received request via: Pharmacy    Was the patient seen in the last year in this department? Yes    Does the patient have an active prescription (recently filled or refills available) for medication(s) requested? No     Last visit 4/13/21

## 2022-01-27 RX ORDER — LEVOTHYROXINE SODIUM 25 MCG
TABLET ORAL
Qty: 30 TABLET | Refills: 0 | Status: SHIPPED | OUTPATIENT
Start: 2022-01-27 | End: 2022-04-01 | Stop reason: SDUPTHER

## 2022-02-02 ENCOUNTER — TELEMEDICINE (OUTPATIENT)
Dept: MEDICAL GROUP | Facility: IMAGING CENTER | Age: 37
End: 2022-02-02
Payer: OTHER MISCELLANEOUS

## 2022-02-02 VITALS — WEIGHT: 153 LBS | HEIGHT: 70 IN | BODY MASS INDEX: 21.9 KG/M2

## 2022-02-02 DIAGNOSIS — Z13.0 SCREENING FOR DEFICIENCY ANEMIA: ICD-10-CM

## 2022-02-02 DIAGNOSIS — Z13.220 SCREENING FOR HYPERLIPIDEMIA: ICD-10-CM

## 2022-02-02 DIAGNOSIS — E55.9 VITAMIN D DEFICIENCY: ICD-10-CM

## 2022-02-02 DIAGNOSIS — Z13.1 SCREENING FOR DIABETES MELLITUS (DM): ICD-10-CM

## 2022-02-02 DIAGNOSIS — E03.9 ACQUIRED HYPOTHYROIDISM: ICD-10-CM

## 2022-02-02 PROCEDURE — 99213 OFFICE O/P EST LOW 20 MIN: CPT | Mod: 95 | Performed by: FAMILY MEDICINE

## 2022-02-02 ASSESSMENT — PAIN SCALES - GENERAL: PAINLEVEL: NO PAIN

## 2022-02-02 ASSESSMENT — PATIENT HEALTH QUESTIONNAIRE - PHQ9: CLINICAL INTERPRETATION OF PHQ2 SCORE: 0

## 2022-02-02 ASSESSMENT — FIBROSIS 4 INDEX: FIB4 SCORE: 0.88

## 2022-02-02 NOTE — PROGRESS NOTES
Telemedicine Visit: New Patient     This encounter was conducted via Zoom.   Verbal consent was obtained. Patient's identity was verified. Patient aware this will be   billed the same as an in person evaluation.  Patient in home, I am in office at [address] [place] [address]  Subjective:     Chief Complaint   Patient presents with   • Requesting Labs       Juanita Montero is a 36 y.o. female with history of   Patient Active Problem List   Diagnosis   • Encounter for initial prescription of contraceptive pills   • Attention deficit hyperactivity disorder   • Acquired hypothyroidism   • Anxiety   • Mild intermittent asthma without complication     on virtual visit to discuss routine lab work.   Her moms bun has always been elevated. Her bun was elevated and gfr low last labs. Patient reports a temporary increase in adderall dose and possibly drinking less water prior to labs. Other wise she is very healthy. Eats none processed foods and is a .   Recurrent yeast infections. Having iud out soon  She did have some struggles with covid infection causing shortness of breath and asthma exacerbation. She was taking Singulair at the time and was having si. So this medication was discontinued. Though she now thinks that the suicidal ideation was a sequelae of covid 1 infection. She has since tried the medication again since it does help with her breathing and she has not had any issues with mood changes, suicidal or homicidal thoughts. Her periods also became irregular after the covid infection.     ROS  See HPI  Constitutional: Negative for fever, chills and malaise/fatigue.   HENT: Negative for congestion, itchy watery eyes  Eyes: Negative for pain or sudden changes in vision  Respiratory: Negative for cough and shortness of breath.    Cardiovascular: Negative for leg swelling or chest pain  Gastrointestinal: Negative for nausea, vomiting, abdominal pain and diarrhea.   Genitourinary: Negative  for dysuria and hematuria.   Skin: Negative for rash or concerning moles   Neurological: Negative for dizziness, focal weakness   Psychiatric/Behavioral: Negative for depression.  The patient is not nervous/anxious.    Musculoskeletal: no weakness or joint stiffness    Allergies   Allergen Reactions   • Eggs Anaphylaxis   • Shellfish Allergy Anaphylaxis   • Wheat Bran Anaphylaxis   • Ibuprofen Anaphylaxis   • Tree Nuts Food Allergy Shortness of Breath       Current medicines (including changes today)  Current Outpatient Medications   Medication Sig Dispense Refill   • Multiple Vitamin (MULTIVITAMIN PO) Take  by mouth.     • SYNTHROID 25 MCG Tab TAKE ONE TABLET BY MOUTH EVERY MORNING ON AN EMPTY STOMACH 30 Tablet 0   • liothyronine (CYTOMEL) 5 MCG Tab TAKE ONE TABLET BY MOUTH DAILY 30 Tablet 0   • SYNTHROID 200 MCG Tab TAKE ONE TABLET BY MOUTH EVERY MORNING ON AN EMPTY STOMACH 90 Tablet 1   • amphetamine-dextroamphetamine (ADDERALL XR) 20 MG per XR capsule      • amphetamine-dextroamphetamine (ADDERALL) 10 MG Tab      • EPINEPHrine (EPIPEN) 0.3 MG/0.3ML Solution Auto-injector solution for injection Inject 0.3 mL into the thigh one time for 1 dose. 1 Each 0   • Spacer/Aero-Hold Chamber Bags Misc Dispense 1 spacer for albuterol MDI. 1 Each 0   • Blood Glucose Meter Kit Test blood sugar as recommended by provider when near-syncopal for hypoglycemia. One Touch Verio blood glucose monitoring kit. 1 Kit 0   • Blood Glucose Test Strips Use one One Touch Verio Flex strip to test blood sugar once daily . 30 Each 1   • Lancets Use one One Touch Verio Flex lancet to test blood sugar once daily . 100 Each 0   • Alcohol Swabs Wipe site with prep pad prior to injection. 100 Each 0   • albuterol 108 (90 Base) MCG/ACT Aero Soln inhalation aerosol Inhale 2 Puffs by mouth every 6 hours as needed for Shortness of Breath. 8.5 g 1   • MAGNESIUM PO Take  by mouth.     • DIGESTIVE ENZYMES PO Take  by mouth.     • Probiotic Product  "(PROBIOTIC PO) Take  by mouth.     • montelukast (SINGULAIR) 10 MG Tab TAKE ONE TABLET BY MOUTH DAILY 90 Tablet 3   • fluconazole (DIFLUCAN) 150 MG tablet Take 1 tablet by mouth every day. 90 tablet 0     No current facility-administered medications for this visit.       She  has a past medical history of ADHD, Allergy, Anxiety, Depression, Hashimoto's disease, Hyperthyroidism, and Restless leg syndrome.  She  has a past surgical history that includes mammoplasty augmentation (2011).      Family History   Problem Relation Age of Onset   • Osteoporosis Mother    • No Known Problems Father      Family Status   Relation Name Status   • Mo  Alive   • Fa  Alive       Patient Active Problem List    Diagnosis Date Noted   • Mild intermittent asthma without complication 06/22/2020   • Anxiety 12/05/2019   • Acquired hypothyroidism 08/29/2016   • Encounter for initial prescription of contraceptive pills 07/13/2016   • Attention deficit hyperactivity disorder 07/13/2016          Objective:   Vitals obtained by patient:  Ht 1.778 m (5' 10\")   Wt 69.4 kg (153 lb)   LMP 01/12/2022   BMI 21.95 kg/m²     Physical Exam:  Constitutional: Alert, no distress, well-groomed.  Skin: No rashes in visible areas.  Eye: Round. Conjunctiva clear, lids normal. No icterus.   ENMT: Lips pink without lesions, good dentition, moist mucous membranes. Phonation normal.  Neck: No masses, no thyromegaly. Moves freely without pain.  CV: Pulse as reported by patient  Respiratory: Unlabored respiratory effort, no cough or audible wheeze  Psych: Alert and oriented x3, normal affect and mood.   Neuro: symmetric face. Alert and oriented. Follows commands. No aphasia or dysarthria.    Labs:  No visits with results within 1 Month(s) from this visit.   Latest known visit with results is:   Hospital Outpatient Visit on 05/27/2021   Component Date Value Ref Range Status   • Candida species DNA Probe 05/27/2021 Negative  Negative Final   • Trichamonas " vaginalis DNA Probe 05/27/2021 Negative  Negative Final   • Gardnerella vaginalis DNA Probe 05/27/2021 Negative  Negative Final    Comment: Note:  Effective 02-11-14 the Direct Probe Assay for Bacterial Vaginal Pathogens  will be performed at [facility].    A positive result for Candida, Gardnerella and/or Trichomonas means nucleic  acid for Candida species (C. albicans, C. glabrata, C. kefyr, C. krusei,  C. parapsilosis, C. tropicalis), G. vaginalis and/or T. vaginalis,  respectively, is present in the sample and indicates the patient has  candidiasis, bacterial vaginosis, and/or trichomoniasis when consistent with  clinical signs and symptoms. Simultaneous infections by more than one  organism are common.         Imaging:   No results found.    Assessment and Plan:   The following treatment plan was discussed:   Pt is aware of the risk of Singulair especially suicidal thoughts and actions could occur. She expresses understanding.   Problem List Items Addressed This Visit     Acquired hypothyroidism    Relevant Orders    TSH WITH REFLEX TO FT4 (Completed)      Other Visit Diagnoses     Vitamin D deficiency        Relevant Orders    VITAMIN D,25 HYDROXY (Completed)    Screening for deficiency anemia        Relevant Orders    CBC WITH DIFFERENTIAL (Completed)    Screening for diabetes mellitus (DM)        Relevant Orders    Comp Metabolic Panel (Completed)    HEMOGLOBIN A1C (Completed)    Screening for hyperlipidemia        Relevant Orders    Lipid Profile (Completed)          Follow-up: Return for annual.        Portions of this note may be dictated using Dragon NaturallySpeaking voice recognition software.  Variances in spelling and vocabulary are possible and unintentional.  Not all areas may be caught/corrected.  Please notify me if any discrepancies are noted or if the meaning of any statement is not correct/clear.

## 2022-02-11 ENCOUNTER — HOSPITAL ENCOUNTER (OUTPATIENT)
Dept: LAB | Facility: MEDICAL CENTER | Age: 37
End: 2022-02-11
Attending: FAMILY MEDICINE
Payer: OTHER MISCELLANEOUS

## 2022-02-11 DIAGNOSIS — Z13.0 SCREENING FOR DEFICIENCY ANEMIA: ICD-10-CM

## 2022-02-11 DIAGNOSIS — Z13.1 SCREENING FOR DIABETES MELLITUS (DM): ICD-10-CM

## 2022-02-11 DIAGNOSIS — Z13.220 SCREENING FOR HYPERLIPIDEMIA: ICD-10-CM

## 2022-02-11 DIAGNOSIS — E55.9 VITAMIN D DEFICIENCY: ICD-10-CM

## 2022-02-11 DIAGNOSIS — E03.9 ACQUIRED HYPOTHYROIDISM: ICD-10-CM

## 2022-02-11 LAB
25(OH)D3 SERPL-MCNC: 34 NG/ML (ref 30–100)
ALBUMIN SERPL BCP-MCNC: 4.2 G/DL (ref 3.2–4.9)
ALBUMIN/GLOB SERPL: 1.6 G/DL
ALP SERPL-CCNC: 68 U/L (ref 30–99)
ALT SERPL-CCNC: 26 U/L (ref 2–50)
ANION GAP SERPL CALC-SCNC: 6 MMOL/L (ref 7–16)
AST SERPL-CCNC: 24 U/L (ref 12–45)
BASOPHILS # BLD AUTO: 0.5 % (ref 0–1.8)
BASOPHILS # BLD: 0.03 K/UL (ref 0–0.12)
BILIRUB SERPL-MCNC: 0.2 MG/DL (ref 0.1–1.5)
BUN SERPL-MCNC: 15 MG/DL (ref 8–22)
CALCIUM SERPL-MCNC: 9.2 MG/DL (ref 8.5–10.5)
CHLORIDE SERPL-SCNC: 102 MMOL/L (ref 96–112)
CHOLEST SERPL-MCNC: 145 MG/DL (ref 100–199)
CO2 SERPL-SCNC: 26 MMOL/L (ref 20–33)
CREAT SERPL-MCNC: 0.79 MG/DL (ref 0.5–1.4)
EOSINOPHIL # BLD AUTO: 0.09 K/UL (ref 0–0.51)
EOSINOPHIL NFR BLD: 1.5 % (ref 0–6.9)
ERYTHROCYTE [DISTWIDTH] IN BLOOD BY AUTOMATED COUNT: 42.7 FL (ref 35.9–50)
EST. AVERAGE GLUCOSE BLD GHB EST-MCNC: 97 MG/DL
FASTING STATUS PATIENT QL REPORTED: NORMAL
GLOBULIN SER CALC-MCNC: 2.6 G/DL (ref 1.9–3.5)
GLUCOSE SERPL-MCNC: 92 MG/DL (ref 65–99)
HBA1C MFR BLD: 5 % (ref 4–5.6)
HCT VFR BLD AUTO: 45.6 % (ref 37–47)
HDLC SERPL-MCNC: 79 MG/DL
HGB BLD-MCNC: 15.3 G/DL (ref 12–16)
IMM GRANULOCYTES # BLD AUTO: 0.01 K/UL (ref 0–0.11)
IMM GRANULOCYTES NFR BLD AUTO: 0.2 % (ref 0–0.9)
LDLC SERPL CALC-MCNC: 59 MG/DL
LYMPHOCYTES # BLD AUTO: 1.82 K/UL (ref 1–4.8)
LYMPHOCYTES NFR BLD: 29.4 % (ref 22–41)
MCH RBC QN AUTO: 31 PG (ref 27–33)
MCHC RBC AUTO-ENTMCNC: 33.6 G/DL (ref 33.6–35)
MCV RBC AUTO: 92.5 FL (ref 81.4–97.8)
MONOCYTES # BLD AUTO: 0.37 K/UL (ref 0–0.85)
MONOCYTES NFR BLD AUTO: 6 % (ref 0–13.4)
NEUTROPHILS # BLD AUTO: 3.86 K/UL (ref 2–7.15)
NEUTROPHILS NFR BLD: 62.4 % (ref 44–72)
NRBC # BLD AUTO: 0 K/UL
NRBC BLD-RTO: 0 /100 WBC
PLATELET # BLD AUTO: 343 K/UL (ref 164–446)
PMV BLD AUTO: 10.4 FL (ref 9–12.9)
POTASSIUM SERPL-SCNC: 4.7 MMOL/L (ref 3.6–5.5)
PROT SERPL-MCNC: 6.8 G/DL (ref 6–8.2)
RBC # BLD AUTO: 4.93 M/UL (ref 4.2–5.4)
SODIUM SERPL-SCNC: 134 MMOL/L (ref 135–145)
T4 FREE SERPL-MCNC: 2.25 NG/DL (ref 0.93–1.7)
TRIGL SERPL-MCNC: 33 MG/DL (ref 0–149)
TSH SERPL DL<=0.005 MIU/L-ACNC: 0.06 UIU/ML (ref 0.38–5.33)
WBC # BLD AUTO: 6.2 K/UL (ref 4.8–10.8)

## 2022-02-11 PROCEDURE — 36415 COLL VENOUS BLD VENIPUNCTURE: CPT

## 2022-02-11 PROCEDURE — 84443 ASSAY THYROID STIM HORMONE: CPT

## 2022-02-11 PROCEDURE — 83036 HEMOGLOBIN GLYCOSYLATED A1C: CPT

## 2022-02-11 PROCEDURE — 80061 LIPID PANEL: CPT

## 2022-02-11 PROCEDURE — 84439 ASSAY OF FREE THYROXINE: CPT

## 2022-02-11 PROCEDURE — 82306 VITAMIN D 25 HYDROXY: CPT

## 2022-02-11 PROCEDURE — 80053 COMPREHEN METABOLIC PANEL: CPT

## 2022-02-11 PROCEDURE — 85025 COMPLETE CBC W/AUTO DIFF WBC: CPT

## 2022-02-23 ENCOUNTER — TELEMEDICINE (OUTPATIENT)
Dept: MEDICAL GROUP | Facility: IMAGING CENTER | Age: 37
End: 2022-02-23
Payer: OTHER MISCELLANEOUS

## 2022-02-23 VITALS — HEART RATE: 55 BPM | BODY MASS INDEX: 22.19 KG/M2 | WEIGHT: 155 LBS | HEIGHT: 70 IN

## 2022-02-23 DIAGNOSIS — E03.9 ACQUIRED HYPOTHYROIDISM: ICD-10-CM

## 2022-02-23 PROCEDURE — 99213 OFFICE O/P EST LOW 20 MIN: CPT | Mod: 95 | Performed by: FAMILY MEDICINE

## 2022-02-23 RX ORDER — ACETAMINOPHEN AND CODEINE PHOSPHATE 120; 12 MG/5ML; MG/5ML
1 SOLUTION ORAL DAILY
COMMUNITY
End: 2022-04-28

## 2022-02-23 ASSESSMENT — FIBROSIS 4 INDEX: FIB4 SCORE: 0.52

## 2022-02-23 ASSESSMENT — PAIN SCALES - GENERAL: PAINLEVEL: NO PAIN

## 2022-02-23 NOTE — ASSESSMENT & PLAN NOTE
TSH low T4 high.  Recent change in birth control.  Instead of making changes today we will repeat labs in 6 weeks from when she started the birth control/had mirena removed.  If labs are the same or similar we will discontinue the 25 mcg of Synthroid. Then we will repeat the labs 8 weeks after the medication change.

## 2022-02-23 NOTE — PROGRESS NOTES
Telemedicine Visit: New Patient     This encounter was conducted via Zoom.   Verbal consent was obtained. Patient's identity was verified. Patient aware this will be   billed the same as an in person evaluation.  Patient in home, I am in office at [address] [place] [address]  Subjective:     Chief Complaint   Patient presents with   • Lab Results       Juanita Montero is a 36 y.o. female with history of   Patient Active Problem List   Diagnosis   • Encounter for initial prescription of contraceptive pills   • Attention deficit hyperactivity disorder   • Acquired hypothyroidism   • Anxiety   • Mild intermittent asthma without complication      on virtual visit to discuss abnormal thyroid labs.   Has been taking magnesium with synthroid for years. Has not had/needed a change in thyroid meds for many years. She did get her iud out recently and started to experience pms symptoms. Started progesterone only birth control and does feel much better. Norethindrone 35mg. Feels that her pms symptoms have resolved since starting.  She denies any sudden vision changes headache palpitations changes in bowel habits skin or hair.     ROS  See HPI  Constitutional: Negative for fever, chills and malaise/fatigue.   HENT: Negative for congestion, itchy watery eyes  Eyes: Negative for pain or sudden changes in vision  Respiratory: Negative for cough and shortness of breath.    Cardiovascular: Negative for leg swelling or chest pain  Gastrointestinal: Negative for nausea, vomiting, abdominal pain and diarrhea.   Genitourinary: Negative for dysuria and hematuria.   Skin: Negative for rash or concerning moles   Neurological: Negative for dizziness, focal weakness   Psychiatric/Behavioral: Negative for depression.  The patient is not nervous/anxious.    Musculoskeletal: no weakness or joint stiffness    Allergies   Allergen Reactions   • Eggs Anaphylaxis   • Shellfish Allergy Anaphylaxis   • Wheat Bran Anaphylaxis   •  Ibuprofen Anaphylaxis   • Tree Nuts Food Allergy Shortness of Breath       Current medicines (including changes today)  Current Outpatient Medications   Medication Sig Dispense Refill   • norethindrone (MICRONOR) 0.35 MG tablet Take 1 Tablet by mouth every day.     • montelukast (SINGULAIR) 10 MG Tab TAKE ONE TABLET BY MOUTH DAILY 90 Tablet 3   • Multiple Vitamin (MULTIVITAMIN PO) Take  by mouth.     • SYNTHROID 25 MCG Tab TAKE ONE TABLET BY MOUTH EVERY MORNING ON AN EMPTY STOMACH 30 Tablet 0   • liothyronine (CYTOMEL) 5 MCG Tab TAKE ONE TABLET BY MOUTH DAILY 30 Tablet 0   • SYNTHROID 200 MCG Tab TAKE ONE TABLET BY MOUTH EVERY MORNING ON AN EMPTY STOMACH 90 Tablet 1   • amphetamine-dextroamphetamine (ADDERALL XR) 20 MG per XR capsule      • amphetamine-dextroamphetamine (ADDERALL) 10 MG Tab      • EPINEPHrine (EPIPEN) 0.3 MG/0.3ML Solution Auto-injector solution for injection Inject 0.3 mL into the thigh one time for 1 dose. 1 Each 0   • albuterol 108 (90 Base) MCG/ACT Aero Soln inhalation aerosol Inhale 2 Puffs by mouth every 6 hours as needed for Shortness of Breath. 8.5 g 1   • MAGNESIUM PO Take  by mouth.     • DIGESTIVE ENZYMES PO Take  by mouth.     • Probiotic Product (PROBIOTIC PO) Take  by mouth.     • Spacer/Aero-Hold Chamber Bags Misc Dispense 1 spacer for albuterol MDI. 1 Each 0   • Blood Glucose Meter Kit Test blood sugar as recommended by provider when near-syncopal for hypoglycemia. One Touch Verio blood glucose monitoring kit. 1 Kit 0   • Blood Glucose Test Strips Use one One Touch Verio Flex strip to test blood sugar once daily . 30 Each 1   • Lancets Use one One Touch Verio Flex lancet to test blood sugar once daily . 100 Each 0   • Alcohol Swabs Wipe site with prep pad prior to injection. 100 Each 0     No current facility-administered medications for this visit.       She  has a past medical history of ADHD, Allergy, Anxiety, Depression, Hashimoto's disease, Hyperthyroidism, and Restless leg  "syndrome.  She  has a past surgical history that includes mammoplasty augmentation (2011).      Family History   Problem Relation Age of Onset   • Osteoporosis Mother    • No Known Problems Father      Family Status   Relation Name Status   • Mo  Alive   • Fa  Alive       Patient Active Problem List    Diagnosis Date Noted   • Mild intermittent asthma without complication 06/22/2020   • Anxiety 12/05/2019   • Acquired hypothyroidism 08/29/2016   • Encounter for initial prescription of contraceptive pills 07/13/2016   • Attention deficit hyperactivity disorder 07/13/2016          Objective:   Vitals obtained by patient:  Pulse (!) 55   Ht 1.778 m (5' 10\")   Wt 70.3 kg (155 lb)   BMI 22.24 kg/m²     Physical Exam:  Constitutional: Alert, no distress, well-groomed.  Skin: No rashes in visible areas.  Eye: Round. Conjunctiva clear, lids normal. No icterus.   ENMT: Lips pink without lesions, good dentition, moist mucous membranes. Phonation normal.  Neck: No masses, no thyromegaly. Moves freely without pain.  CV: Pulse as reported by patient  Respiratory: Unlabored respiratory effort, no cough or audible wheeze  Psych: Alert and oriented x3, normal affect and mood.   Neuro: symmetric face. Alert and oriented. Follows commands. No aphasia or dysarthria.    Labs:  Hospital Outpatient Visit on 02/11/2022   Component Date Value Ref Range Status   • 25-Hydroxy   Vitamin D 25 02/11/2022 34  30 - 100 ng/mL Final    Comment: Adult Ranges:   <20 ng/mL - Deficiency  20-29 ng/mL - Insufficiency   ng/mL - Sufficiency  Effective 3/18/2020, this electrochemiluminescence binding assay is  performed using Roche radha e immunoassay analyzer.  The [employer] Vitamin D  total II assay is intended for the quantitative determination of total 25  hydroxyvitamin D in human serum and plasma. This assay is to be used as an  aid in the assessment of vitamin D sufficiency in adults.     • WBC 02/11/2022 6.2  4.8 - 10.8 K/uL Final   • RBC " 02/11/2022 4.93  4.20 - 5.40 M/uL Final   • Hemoglobin 02/11/2022 15.3  12.0 - 16.0 g/dL Final   • Hematocrit 02/11/2022 45.6  37.0 - 47.0 % Final   • MCV 02/11/2022 92.5  81.4 - 97.8 fL Final   • MCH 02/11/2022 31.0  27.0 - 33.0 pg Final   • MCHC 02/11/2022 33.6  33.6 - 35.0 g/dL Final   • RDW 02/11/2022 42.7  35.9 - 50.0 fL Final   • Platelet Count 02/11/2022 343  164 - 446 K/uL Final   • MPV 02/11/2022 10.4  9.0 - 12.9 fL Final   • Neutrophils-Polys 02/11/2022 62.40  44.00 - 72.00 % Final   • Lymphocytes 02/11/2022 29.40  22.00 - 41.00 % Final   • Monocytes 02/11/2022 6.00  0.00 - 13.40 % Final   • Eosinophils 02/11/2022 1.50  0.00 - 6.90 % Final   • Basophils 02/11/2022 0.50  0.00 - 1.80 % Final   • Immature Granulocytes 02/11/2022 0.20  0.00 - 0.90 % Final   • Nucleated RBC 02/11/2022 0.00  /100 WBC Final   • Neutrophils (Absolute) 02/11/2022 3.86  2.00 - 7.15 K/uL Final    Includes immature neutrophils, if present.   • Lymphs (Absolute) 02/11/2022 1.82  1.00 - 4.80 K/uL Final   • Monos (Absolute) 02/11/2022 0.37  0.00 - 0.85 K/uL Final   • Eos (Absolute) 02/11/2022 0.09  0.00 - 0.51 K/uL Final   • Baso (Absolute) 02/11/2022 0.03  0.00 - 0.12 K/uL Final   • Immature Granulocytes (abs) 02/11/2022 0.01  0.00 - 0.11 K/uL Final   • NRBC (Absolute) 02/11/2022 0.00  K/uL Final   • Sodium 02/11/2022 134 (A) 135 - 145 mmol/L Final   • Potassium 02/11/2022 4.7  3.6 - 5.5 mmol/L Final   • Chloride 02/11/2022 102  96 - 112 mmol/L Final   • Co2 02/11/2022 26  20 - 33 mmol/L Final   • Anion Gap 02/11/2022 6.0 (A) 7.0 - 16.0 Final   • Glucose 02/11/2022 92  65 - 99 mg/dL Final   • Bun 02/11/2022 15  8 - 22 mg/dL Final   • Creatinine 02/11/2022 0.79  0.50 - 1.40 mg/dL Final   • Calcium 02/11/2022 9.2  8.5 - 10.5 mg/dL Final   • AST(SGOT) 02/11/2022 24  12 - 45 U/L Final   • ALT(SGPT) 02/11/2022 26  2 - 50 U/L Final   • Alkaline Phosphatase 02/11/2022 68  30 - 99 U/L Final   • Total Bilirubin 02/11/2022 0.2  0.1 - 1.5 mg/dL  Final   • Albumin 02/11/2022 4.2  3.2 - 4.9 g/dL Final   • Total Protein 02/11/2022 6.8  6.0 - 8.2 g/dL Final   • Globulin 02/11/2022 2.6  1.9 - 3.5 g/dL Final   • A-G Ratio 02/11/2022 1.6  g/dL Final   • Glycohemoglobin 02/11/2022 5.0  4.0 - 5.6 % Final    Comment: Increased risk for diabetes:  5.7 -6.4%  Diabetes:  >6.4%  Glycemic control for adults with diabetes:  <7.0%    The above interpretations are per ADA guidelines.  Diagnosis  of diabetes mellitus on the basis of elevated Hemoglobin A1c  should be confirmed by repeating the Hb A1c test.     • Est Avg Glucose 02/11/2022 97  mg/dL Final    Comment: The eAG calculation is based on the A1c-Derived Daily Glucose  (ADAG) study.  See the ADA's website for additional information.     • Cholesterol,Tot 02/11/2022 145  100 - 199 mg/dL Final   • Triglycerides 02/11/2022 33  0 - 149 mg/dL Final   • HDL 02/11/2022 79  >=40 mg/dL Final   • LDL 02/11/2022 59  <100 mg/dL Final   • TSH 02/11/2022 0.060 (A) 0.380 - 5.330 uIU/mL Final    Comment: Reference Range:    Pregnant Females, 1st Trimester  0.050-3.700  Pregnant Females, 2nd Trimester  0.310-4.350  Pregnant Females, 3rd Trimester  0.410-5.180     • Fasting Status 02/11/2022 Fasting   Final   • GFR If  02/11/2022 >60  >60 mL/min/1.73 m 2 Final   • GFR If Non  02/11/2022 >60  >60 mL/min/1.73 m 2 Final   • Free T-4 02/11/2022 2.25 (A) 0.93 - 1.70 ng/dL Final       Imaging:   No results found.    Assessment and Plan:   The following treatment plan was discussed:   Problem List Items Addressed This Visit     Acquired hypothyroidism     TSH low T4 high.  Recent change in birth control.  Instead of making changes today we will repeat labs in 6 weeks from when she started the birth control/had mirena removed.  If labs are the same or similar we will discontinue the 25 mcg of Synthroid. Then we will repeat the labs 8 weeks after the medication change.          Relevant Orders    TSH    FREE  THYROXINE    T3 FREE          Follow-up: Return for pending labs.        Portions of this note may be dictated using Dragon [email or web address] voice recognition software.  Variances in spelling and vocabulary are possible and unintentional.  Not all areas may be caught/corrected.  Please notify me if any discrepancies are noted or if the meaning of any statement is not correct/clear.

## 2022-02-28 ENCOUNTER — HOSPITAL ENCOUNTER (OUTPATIENT)
Facility: MEDICAL CENTER | Age: 37
End: 2022-02-28
Attending: STUDENT IN AN ORGANIZED HEALTH CARE EDUCATION/TRAINING PROGRAM
Payer: OTHER MISCELLANEOUS

## 2022-02-28 ENCOUNTER — OFFICE VISIT (OUTPATIENT)
Dept: URGENT CARE | Facility: CLINIC | Age: 37
End: 2022-02-28
Payer: OTHER MISCELLANEOUS

## 2022-02-28 VITALS
TEMPERATURE: 99 F | HEART RATE: 92 BPM | DIASTOLIC BLOOD PRESSURE: 60 MMHG | OXYGEN SATURATION: 100 % | RESPIRATION RATE: 16 BRPM | HEIGHT: 70 IN | BODY MASS INDEX: 22.19 KG/M2 | SYSTOLIC BLOOD PRESSURE: 98 MMHG | WEIGHT: 155 LBS

## 2022-02-28 DIAGNOSIS — R53.83 MALAISE AND FATIGUE: ICD-10-CM

## 2022-02-28 DIAGNOSIS — R53.81 MALAISE AND FATIGUE: ICD-10-CM

## 2022-02-28 DIAGNOSIS — R10.2 PELVIC PAIN: ICD-10-CM

## 2022-02-28 LAB
EXTERNAL QUALITY CONTROL: NORMAL
SARS-COV+SARS-COV-2 AG RESP QL IA.RAPID: NEGATIVE

## 2022-02-28 PROCEDURE — U0003 INFECTIOUS AGENT DETECTION BY NUCLEIC ACID (DNA OR RNA); SEVERE ACUTE RESPIRATORY SYNDROME CORONAVIRUS 2 (SARS-COV-2) (CORONAVIRUS DISEASE [COVID-19]), AMPLIFIED PROBE TECHNIQUE, MAKING USE OF HIGH THROUGHPUT TECHNOLOGIES AS DESCRIBED BY CMS-2020-01-R: HCPCS

## 2022-02-28 PROCEDURE — 99213 OFFICE O/P EST LOW 20 MIN: CPT | Performed by: STUDENT IN AN ORGANIZED HEALTH CARE EDUCATION/TRAINING PROGRAM

## 2022-02-28 PROCEDURE — 87480 CANDIDA DNA DIR PROBE: CPT

## 2022-02-28 PROCEDURE — 87426 SARSCOV CORONAVIRUS AG IA: CPT | Performed by: STUDENT IN AN ORGANIZED HEALTH CARE EDUCATION/TRAINING PROGRAM

## 2022-02-28 PROCEDURE — 87510 GARDNER VAG DNA DIR PROBE: CPT

## 2022-02-28 PROCEDURE — U0005 INFEC AGEN DETEC AMPLI PROBE: HCPCS

## 2022-02-28 PROCEDURE — 87660 TRICHOMONAS VAGIN DIR PROBE: CPT

## 2022-02-28 ASSESSMENT — ENCOUNTER SYMPTOMS
CHILLS: 0
FEVER: 0

## 2022-02-28 ASSESSMENT — FIBROSIS 4 INDEX: FIB4 SCORE: 0.52

## 2022-02-28 NOTE — PROGRESS NOTES
"Subjective     Juanita Montero is a 36 y.o. female who presents with Abdominal Pain (/cramping x2 weeks, notes recently having IUD removed), GI Problem (Bloating/pain x3 days /), Body Aches, and Nausea            Patient is a variable 36-year-old female who presents clinic with complaints of mild pelvic pain generalized malaise and subjective fever.  Patient does report a history significant for bacterial yeast infections in her vagina in addition to bacterial vaginosis.  Of note patient recently had her IUD removed approximately 10 days prior to presentation.  Patient reports mild residual subjective sensation of pain in the pelvic area no dysuria no frequency urgency hesitancy.  Patient presents clinic for further evaluation.      Review of Systems   Constitutional: Negative for chills, fever and malaise/fatigue.   Genitourinary: Negative for dysuria, frequency and urgency.        Pelvic pain   All other systems reviewed and are negative.             Objective     BP (!) 98/60   Pulse 92   Temp 37.2 °C (99 °F) (Temporal)   Resp 16   Ht 1.778 m (5' 10\")   Wt 70.3 kg (155 lb)   SpO2 100%   Breastfeeding No Comment: intermittent bleeding x1 mo, recent IUD removal   BMI 22.24 kg/m²      Physical Exam  Vitals reviewed. Exam conducted with a chaperone present.   Constitutional:       General: She is not in acute distress.     Appearance: She is not ill-appearing.   Abdominal:      Tenderness: There is no right CVA tenderness or left CVA tenderness.   Genitourinary:     Comments: Entirety of pelvic examination was performed in the presence of female medical assistant Charito.    External genitalia no evidence of rash lesions or ulcers vaginal canal mucosa within acceptable limits mild evidence of thick white discharge at cervical os.  Swabs collected without issue no evidence of cervical friability.  No pain upon mobilization of cervix.  Neurological:      Mental Status: She is alert.                "              Assessment & Plan        1. Malaise and fatigue  Rapid Covid test  Covid PCR test  - SARS-CoV-2 PCR (24 hour In-House): Collect NP swab in VTM; Future  - POCT SARS-COV Antigen PEG (Symptomatic only)    2. Pelvic pain  Patient does have significant history of bacterial vaginosis in addition to vaginal candidiasis with recent IUD removal opted to collect for vaginal pathology panel.  Plan:  1.  Vaginal pathology panel.    Counseled patient will follow up results in approximately 24 to 48 hours.  Counseled patient that should her symptoms suddenly worsen or not improved go to the nearest emergency department.  Patient endorsed understanding.  - VAGINAL PATHOGENS DNA PANEL; Future

## 2022-03-01 DIAGNOSIS — R53.83 MALAISE AND FATIGUE: ICD-10-CM

## 2022-03-01 DIAGNOSIS — R53.81 MALAISE AND FATIGUE: ICD-10-CM

## 2022-03-01 DIAGNOSIS — R10.2 PELVIC PAIN: ICD-10-CM

## 2022-03-01 LAB
CANDIDA DNA VAG QL PROBE+SIG AMP: NEGATIVE
COVID ORDER STATUS COVID19: NORMAL
G VAGINALIS DNA VAG QL PROBE+SIG AMP: NEGATIVE
SARS-COV-2 RNA RESP QL NAA+PROBE: NOTDETECTED
SPECIMEN SOURCE: NORMAL
T VAGINALIS DNA VAG QL PROBE+SIG AMP: NEGATIVE

## 2022-04-01 RX ORDER — LEVOTHYROXINE SODIUM 0.03 MG/1
25 TABLET ORAL
Qty: 30 TABLET | Refills: 0 | Status: SHIPPED
Start: 2022-04-01 | End: 2022-04-28

## 2022-04-22 ENCOUNTER — HOSPITAL ENCOUNTER (OUTPATIENT)
Dept: LAB | Facility: MEDICAL CENTER | Age: 37
End: 2022-04-22
Attending: FAMILY MEDICINE
Payer: OTHER MISCELLANEOUS

## 2022-04-22 DIAGNOSIS — E03.9 ACQUIRED HYPOTHYROIDISM: ICD-10-CM

## 2022-04-22 LAB
T3FREE SERPL-MCNC: 2.8 PG/ML (ref 2–4.4)
T4 FREE SERPL-MCNC: 2.34 NG/DL (ref 0.93–1.7)
TSH SERPL DL<=0.005 MIU/L-ACNC: 0.05 UIU/ML (ref 0.38–5.33)

## 2022-04-22 PROCEDURE — 84439 ASSAY OF FREE THYROXINE: CPT

## 2022-04-22 PROCEDURE — 36415 COLL VENOUS BLD VENIPUNCTURE: CPT

## 2022-04-22 PROCEDURE — 84443 ASSAY THYROID STIM HORMONE: CPT

## 2022-04-22 PROCEDURE — 84481 FREE ASSAY (FT-3): CPT

## 2022-04-28 ENCOUNTER — TELEMEDICINE (OUTPATIENT)
Dept: MEDICAL GROUP | Facility: IMAGING CENTER | Age: 37
End: 2022-04-28
Payer: OTHER MISCELLANEOUS

## 2022-04-28 VITALS — HEIGHT: 70 IN | TEMPERATURE: 98.1 F | HEART RATE: 84 BPM | WEIGHT: 155 LBS | BODY MASS INDEX: 22.19 KG/M2

## 2022-04-28 DIAGNOSIS — E03.9 ACQUIRED HYPOTHYROIDISM: ICD-10-CM

## 2022-04-28 PROCEDURE — 99213 OFFICE O/P EST LOW 20 MIN: CPT | Mod: 95 | Performed by: FAMILY MEDICINE

## 2022-04-28 ASSESSMENT — PAIN SCALES - GENERAL: PAINLEVEL: NO PAIN

## 2022-04-28 ASSESSMENT — FIBROSIS 4 INDEX: FIB4 SCORE: 0.54

## 2022-04-28 NOTE — PROGRESS NOTES
Telemedicine Visit: New Patient     This encounter was conducted via Zoom.   Verbal consent was obtained. Patient's identity was verified. Patient aware this will be   billed the same as an in person evaluation.  Patient in home, I am in office at [address] [place] [address]  Subjective:     Chief Complaint   Patient presents with   • Lab Results       Juanita Montero is a 37 y.o. female with history of   Patient Active Problem List   Diagnosis   • Encounter for initial prescription of contraceptive pills   • Attention deficit hyperactivity disorder   • Acquired hypothyroidism   • Anxiety   • Mild intermittent asthma without complication     on virtual visit to discuss thyroid medications.   Stopped cytomel. Now on 225mcg synthroid.  Started progesterone bcp though experienced several symptoms and d/c'ed. Waited 6 weeks to get labs after stopping. She was taking allergy meds and magnesium with synthroid. she was taking 30 minutes after her synthroid dose.   She is feeling well. And reports inflammation in the body feels lower. Stopped cytomel beginning of march 2022.   Has been having intense anxiety and feeling hot.  Otherwise no change in hair skin bowel movements no abdominal pain.  No palpitations.    ROS  See HPI  Constitutional: Negative for fever, chills and malaise/fatigue.   HENT: Negative for congestion, itchy watery eyes  Eyes: Negative for pain or sudden changes in vision  Respiratory: Negative for cough and shortness of breath.    Cardiovascular: Negative for leg swelling or chest pain  Gastrointestinal: Negative for nausea, vomiting, abdominal pain and diarrhea.   Genitourinary: Negative for dysuria and hematuria.   Skin: Negative for rash or concerning moles   Neurological: Negative for dizziness, focal weakness   Psychiatric/Behavioral: Negative for depression.  The patient is not nervous/anxious.    Musculoskeletal: no weakness or joint stiffness    Allergies   Allergen Reactions   •  Eggs Anaphylaxis   • Shellfish Allergy Anaphylaxis   • Wheat Bran Anaphylaxis   • Ibuprofen Anaphylaxis   • Tree Nuts Food Allergy Shortness of Breath       Current medicines (including changes today)  Current Outpatient Medications   Medication Sig Dispense Refill   • montelukast (SINGULAIR) 10 MG Tab TAKE ONE TABLET BY MOUTH DAILY 90 Tablet 3   • Multiple Vitamin (MULTIVITAMIN PO) Take  by mouth.     • SYNTHROID 200 MCG Tab TAKE ONE TABLET BY MOUTH EVERY MORNING ON AN EMPTY STOMACH 90 Tablet 1   • EPINEPHrine (EPIPEN) 0.3 MG/0.3ML Solution Auto-injector solution for injection Inject 0.3 mL into the thigh one time for 1 dose. 1 Each 0   • Spacer/Aero-Hold Chamber Bags Misc Dispense 1 spacer for albuterol MDI. 1 Each 0   • Blood Glucose Meter Kit Test blood sugar as recommended by provider when near-syncopal for hypoglycemia. One Touch Verio blood glucose monitoring kit. 1 Kit 0   • Blood Glucose Test Strips Use one One Touch Verio Flex strip to test blood sugar once daily . 30 Each 1   • Lancets Use one One Touch Verio Flex lancet to test blood sugar once daily . 100 Each 0   • Alcohol Swabs Wipe site with prep pad prior to injection. 100 Each 0   • albuterol 108 (90 Base) MCG/ACT Aero Soln inhalation aerosol Inhale 2 Puffs by mouth every 6 hours as needed for Shortness of Breath. 8.5 g 1   • MAGNESIUM PO Take  by mouth.     • DIGESTIVE ENZYMES PO Take  by mouth.     • Probiotic Product (PROBIOTIC PO) Take  by mouth.     • nitrofurantoin (MACROBID) 100 MG Cap Take 1 Capsule by mouth every 12 hours for 5 days. 10 Capsule 0   • amphetamine-dextroamphetamine (ADDERALL XR) 20 MG per XR capsule      • amphetamine-dextroamphetamine (ADDERALL) 10 MG Tab        No current facility-administered medications for this visit.       She  has a past medical history of ADHD, Allergy, Anxiety, Depression, Hashimoto's disease, Hyperthyroidism, and Restless leg syndrome.  She  has a past surgical history that includes mammoplasty  "augmentation (2011).      Family History   Problem Relation Age of Onset   • Osteoporosis Mother    • No Known Problems Father      Family Status   Relation Name Status   • Mo  Alive   • Fa  Alive       Patient Active Problem List    Diagnosis Date Noted   • Mild intermittent asthma without complication 06/22/2020   • Anxiety 12/05/2019   • Acquired hypothyroidism 08/29/2016   • Encounter for initial prescription of contraceptive pills 07/13/2016   • Attention deficit hyperactivity disorder 07/13/2016          Objective:   Vitals obtained by patient:  Pulse 84   Temp 36.7 °C (98.1 °F)   Ht 1.778 m (5' 10\")   Wt 70.3 kg (155 lb)   LMP 04/01/2022 (Exact Date)   BMI 22.24 kg/m²     Physical Exam:  Constitutional: Alert, no distress, well-groomed.  Skin: No rashes in visible areas.  Eye: Round. Conjunctiva clear, lids normal. No icterus.   ENMT: Lips pink without lesions, good dentition, moist mucous membranes. Phonation normal.  Neck: No masses, no thyromegaly. Moves freely without pain.  CV: Pulse as reported by patient  Respiratory: Unlabored respiratory effort, no cough or audible wheeze  Psych: Alert and oriented x3, normal affect and mood.   Neuro: symmetric face. Alert and oriented. Follows commands. No aphasia or dysarthria.    Labs:  Hospital Outpatient Visit on 04/22/2022   Component Date Value Ref Range Status   • T3,Free 04/22/2022 2.80  2.00 - 4.40 pg/mL Final   • Free T-4 04/22/2022 2.34 (A) 0.93 - 1.70 ng/dL Final   • TSH 04/22/2022 0.050 (A) 0.380 - 5.330 uIU/mL Final    Comment: Reference Range:    Pregnant Females, 1st Trimester  0.050-3.700  Pregnant Females, 2nd Trimester  0.310-4.350  Pregnant Females, 3rd Trimester  0.410-5.180         Imaging:   No results found.    Assessment and Plan:   The following treatment plan was discussed:   Discussed taking magnesium iron calcium and other minerals should be taken 4 hours after thyroid medication.  Continue off Cytomel.  Discontinue the 25 mcg tab " of Synthroid.  Now will only be on 200 echograms of Synthroid daily.  Repeat labs in 6 weeks.  Problem List Items Addressed This Visit     Acquired hypothyroidism    Relevant Orders    TSH    FREE THYROXINE    T3 FREE        Follow-up: Return in about 6 weeks (around 6/9/2022).        Portions of this note may be dictated using Dragon NaturallySpeaking voice recognition software.  Variances in spelling and vocabulary are possible and unintentional.  Not all areas may be caught/corrected.  Please notify me if any discrepancies are noted or if the meaning of any statement is not correct/clear.

## 2022-05-02 ENCOUNTER — HOSPITAL ENCOUNTER (OUTPATIENT)
Facility: MEDICAL CENTER | Age: 37
End: 2022-05-02
Attending: NURSE PRACTITIONER
Payer: OTHER MISCELLANEOUS

## 2022-05-02 ENCOUNTER — OFFICE VISIT (OUTPATIENT)
Dept: URGENT CARE | Facility: CLINIC | Age: 37
End: 2022-05-02
Payer: OTHER MISCELLANEOUS

## 2022-05-02 VITALS
OXYGEN SATURATION: 98 % | DIASTOLIC BLOOD PRESSURE: 84 MMHG | RESPIRATION RATE: 16 BRPM | TEMPERATURE: 98.2 F | HEART RATE: 62 BPM | WEIGHT: 155 LBS | SYSTOLIC BLOOD PRESSURE: 128 MMHG | HEIGHT: 70 IN | BODY MASS INDEX: 22.19 KG/M2

## 2022-05-02 DIAGNOSIS — N89.8 VAGINAL IRRITATION: ICD-10-CM

## 2022-05-02 DIAGNOSIS — R10.2 PELVIC PAIN: ICD-10-CM

## 2022-05-02 DIAGNOSIS — R35.0 URINARY FREQUENCY: ICD-10-CM

## 2022-05-02 DIAGNOSIS — R30.0 DYSURIA: ICD-10-CM

## 2022-05-02 LAB
APPEARANCE UR: CLEAR
BILIRUB UR STRIP-MCNC: NORMAL MG/DL
COLOR UR AUTO: NORMAL
GLUCOSE UR STRIP.AUTO-MCNC: NORMAL MG/DL
INT CON NEG: NORMAL
INT CON POS: NORMAL
KETONES UR STRIP.AUTO-MCNC: NORMAL MG/DL
LEUKOCYTE ESTERASE UR QL STRIP.AUTO: NORMAL
NITRITE UR QL STRIP.AUTO: NORMAL
PH UR STRIP.AUTO: 7.5 [PH] (ref 5–8)
POC URINE PREGNANCY TEST: NEGATIVE
PROT UR QL STRIP: NORMAL MG/DL
RBC UR QL AUTO: NORMAL
SP GR UR STRIP.AUTO: 1.01
UROBILINOGEN UR STRIP-MCNC: 0.2 MG/DL

## 2022-05-02 PROCEDURE — 99213 OFFICE O/P EST LOW 20 MIN: CPT | Performed by: NURSE PRACTITIONER

## 2022-05-02 PROCEDURE — 87086 URINE CULTURE/COLONY COUNT: CPT

## 2022-05-02 PROCEDURE — 87510 GARDNER VAG DNA DIR PROBE: CPT

## 2022-05-02 PROCEDURE — 99000 SPECIMEN HANDLING OFFICE-LAB: CPT | Performed by: NURSE PRACTITIONER

## 2022-05-02 PROCEDURE — 87660 TRICHOMONAS VAGIN DIR PROBE: CPT

## 2022-05-02 PROCEDURE — 87480 CANDIDA DNA DIR PROBE: CPT

## 2022-05-02 PROCEDURE — 81025 URINE PREGNANCY TEST: CPT | Performed by: NURSE PRACTITIONER

## 2022-05-02 PROCEDURE — 81002 URINALYSIS NONAUTO W/O SCOPE: CPT | Performed by: NURSE PRACTITIONER

## 2022-05-02 RX ORDER — NITROFURANTOIN 25; 75 MG/1; MG/1
100 CAPSULE ORAL EVERY 12 HOURS
Qty: 10 CAPSULE | Refills: 0 | Status: SHIPPED | OUTPATIENT
Start: 2022-05-02 | End: 2022-05-07

## 2022-05-02 ASSESSMENT — ENCOUNTER SYMPTOMS
VOMITING: 0
CONSTIPATION: 0
NAUSEA: 0
CHILLS: 0
FLANK PAIN: 0
ABDOMINAL PAIN: 1
FEVER: 0
DIARRHEA: 0

## 2022-05-02 ASSESSMENT — FIBROSIS 4 INDEX: FIB4 SCORE: 0.54

## 2022-05-02 NOTE — PROGRESS NOTES
Subjective:     Juanita Montero is a 37 y.o. female who presents for Dysuria (Hx of UTI, cramping/pain, strong urine odor )      Dysuria   Associated symptoms include frequency. Pertinent negatives include no chills, flank pain, hematuria, nausea, urgency or vomiting.   Juanita is a pleasant 37-year-old female presents to urgent care today with complaints of pelvic pain, abnormal urine odor and urinary frequency that has been ongoing for the past few weeks.  She notes that she develops a urinary tract infection she normally has atypical symptoms of UTI, but then her symptoms rapidly worsen with hematuria.  She has history of both urinary tract infections and pyelonephritis.  She states that she recently had her IUD removed and has been undergoing changes with her Hashimoto's thyroiditis.  She is not sure if her symptoms are related to hormones or if she is developing a urinary tract infection.  Her pain is present when she develops the urge to void.  She denies any vaginal discharge.       Review of Systems   Constitutional: Negative for chills, fever and malaise/fatigue.   Gastrointestinal: Positive for abdominal pain. Negative for constipation, diarrhea, nausea and vomiting.   Genitourinary: Positive for dysuria and frequency. Negative for flank pain, hematuria and urgency.       PMH:   Past Medical History:   Diagnosis Date   • ADHD    • Allergy    • Anxiety    • Depression    • Hashimoto's disease    • Hyperthyroidism    • Restless leg syndrome      ALLERGIES:   Allergies   Allergen Reactions   • Eggs Anaphylaxis   • Shellfish Allergy Anaphylaxis   • Wheat Bran Anaphylaxis   • Ibuprofen Anaphylaxis   • Tree Nuts Food Allergy Shortness of Breath     SURGHX:   Past Surgical History:   Procedure Laterality Date   • MAMMOPLASTY AUGMENTATION  2011     SOCHX:   Social History     Socioeconomic History   • Marital status:    • Highest education level: Some college, no degree   Tobacco Use   • Smoking  "status: Never Smoker   • Smokeless tobacco: Never Used   Vaping Use   • Vaping Use: Never used   Substance and Sexual Activity   • Alcohol use: Yes     Comment: rarely   • Drug use: Yes     Types: Marijuana, Oral     Comment:  a few times a month/occ   • Sexual activity: Yes     FH:   Family History   Problem Relation Age of Onset   • Osteoporosis Mother    • No Known Problems Father          Objective:   /84   Pulse 62   Temp 36.8 °C (98.2 °F) (Temporal)   Resp 16   Ht 1.778 m (5' 10\")   Wt 70.3 kg (155 lb)   LMP 04/01/2022 (Exact Date)   SpO2 98%   Breastfeeding No   BMI 22.24 kg/m²     Physical Exam  Vitals and nursing note reviewed.   Constitutional:       General: She is not in acute distress.     Appearance: Normal appearance. She is not ill-appearing.   HENT:      Head: Normocephalic and atraumatic.      Right Ear: External ear normal.      Left Ear: External ear normal.      Nose: No congestion or rhinorrhea.      Mouth/Throat:      Mouth: Mucous membranes are moist.   Eyes:      Extraocular Movements: Extraocular movements intact.      Pupils: Pupils are equal, round, and reactive to light.   Cardiovascular:      Rate and Rhythm: Normal rate and regular rhythm.      Pulses: Normal pulses.      Heart sounds: Normal heart sounds.   Pulmonary:      Effort: Pulmonary effort is normal.      Breath sounds: Normal breath sounds.   Abdominal:      General: Abdomen is flat. Bowel sounds are normal.      Palpations: Abdomen is soft.      Tenderness: There is generalized abdominal tenderness. There is no right CVA tenderness or left CVA tenderness.   Musculoskeletal:         General: Normal range of motion.      Cervical back: Normal range of motion and neck supple.   Skin:     General: Skin is warm and dry.      Capillary Refill: Capillary refill takes less than 2 seconds.   Neurological:      General: No focal deficit present.      Mental Status: She is alert and oriented to person, place, and time. " Mental status is at baseline.   Psychiatric:         Mood and Affect: Mood normal.         Behavior: Behavior normal.         Thought Content: Thought content normal.         Judgment: Judgment normal.       POCT urine: negative      Assessment/Plan:   Assessment    1. Pelvic pain  VAGINAL PATHOGENS DNA PANEL    POCT Urinalysis    Urine Culture    nitrofurantoin (MACROBID) 100 MG Cap    POCT Pregnancy   2. Vaginal irritation  VAGINAL PATHOGENS DNA PANEL   3. Urinary frequency  POCT Urinalysis    Urine Culture    nitrofurantoin (MACROBID) 100 MG Cap    POCT Pregnancy   4. Dysuria  Urine Culture    nitrofurantoin (MACROBID) 100 MG Cap    POCT Pregnancy     Her urine was sent to the lab for culture.  I will notify her of results.  Due to atypical symptoms she was also tested for bacterial vaginosis and yeast.  She was empirically started on nitrofurantoin for urinary tract infection due to symptoms today. Prescription was sent in to preferred pharmacy. AVS was given and reviewed with patient. Patient educated on red flags of UTI and encouraged to seek care back in UC or ER for  fever, chills, flank pain, or worsening symptoms.

## 2022-05-03 DIAGNOSIS — B96.89 BACTERIAL VAGINOSIS: ICD-10-CM

## 2022-05-03 DIAGNOSIS — N76.0 BACTERIAL VAGINOSIS: ICD-10-CM

## 2022-05-03 LAB
CANDIDA DNA VAG QL PROBE+SIG AMP: NEGATIVE
G VAGINALIS DNA VAG QL PROBE+SIG AMP: POSITIVE
T VAGINALIS DNA VAG QL PROBE+SIG AMP: NEGATIVE

## 2022-05-03 RX ORDER — METRONIDAZOLE 500 MG/1
500 TABLET ORAL 2 TIMES DAILY
Qty: 14 TABLET | Refills: 0 | Status: SHIPPED | OUTPATIENT
Start: 2022-05-03 | End: 2022-05-10

## 2022-05-05 LAB
BACTERIA UR CULT: NORMAL
SIGNIFICANT IND 70042: NORMAL
SITE SITE: NORMAL
SOURCE SOURCE: NORMAL

## 2022-06-15 ENCOUNTER — HOSPITAL ENCOUNTER (OUTPATIENT)
Dept: LAB | Facility: MEDICAL CENTER | Age: 37
End: 2022-06-15
Attending: FAMILY MEDICINE
Payer: OTHER MISCELLANEOUS

## 2022-06-15 DIAGNOSIS — E03.9 ACQUIRED HYPOTHYROIDISM: ICD-10-CM

## 2022-06-15 LAB
T3FREE SERPL-MCNC: 2.65 PG/ML (ref 2–4.4)
T4 FREE SERPL-MCNC: 1.86 NG/DL (ref 0.93–1.7)
TSH SERPL DL<=0.005 MIU/L-ACNC: 0.11 UIU/ML (ref 0.38–5.33)

## 2022-06-15 PROCEDURE — 84439 ASSAY OF FREE THYROXINE: CPT

## 2022-06-15 PROCEDURE — 84443 ASSAY THYROID STIM HORMONE: CPT

## 2022-06-15 PROCEDURE — 84481 FREE ASSAY (FT-3): CPT

## 2022-06-15 PROCEDURE — 36415 COLL VENOUS BLD VENIPUNCTURE: CPT

## 2022-06-15 RX ORDER — LEVOTHYROXINE SODIUM 175 UG/1
175 TABLET ORAL
Qty: 90 TABLET | Refills: 0 | Status: SHIPPED
Start: 2022-06-15 | End: 2022-08-17

## 2022-06-20 ENCOUNTER — TELEPHONE (OUTPATIENT)
Dept: MEDICAL GROUP | Facility: IMAGING CENTER | Age: 37
End: 2022-06-20

## 2022-06-20 ENCOUNTER — APPOINTMENT (OUTPATIENT)
Dept: MEDICAL GROUP | Facility: IMAGING CENTER | Age: 37
End: 2022-06-20
Payer: COMMERCIAL

## 2022-06-20 NOTE — TELEPHONE ENCOUNTER
----- Message from Erinn Corrales sent at 6/20/2022  7:30 AM PDT -----  Regarding: Thyroid Labs  Called patient to r/s her appt since provider is out today. She states that it was only to go over her thyroid results which she already knows. She is pretty certain dr. Senior will want her to just retest in 6 weeks. Please contact patient as to if she needs to reschedule her appointment or when to retest.    Thank you!

## 2022-07-13 ENCOUNTER — PATIENT MESSAGE (OUTPATIENT)
Dept: MEDICAL GROUP | Facility: IMAGING CENTER | Age: 37
End: 2022-07-13
Payer: OTHER MISCELLANEOUS

## 2022-07-13 DIAGNOSIS — E03.9 ACQUIRED HYPOTHYROIDISM: ICD-10-CM

## 2022-07-19 NOTE — PROGRESS NOTES
Medication change made with high tsh down from 200 to 175mcg synthroid. Will add standing follow up labs. And recommend in office visit for thyroid exam

## 2022-08-12 ENCOUNTER — HOSPITAL ENCOUNTER (OUTPATIENT)
Dept: LAB | Facility: MEDICAL CENTER | Age: 37
End: 2022-08-12
Attending: FAMILY MEDICINE
Payer: OTHER MISCELLANEOUS

## 2022-08-12 DIAGNOSIS — E03.9 ACQUIRED HYPOTHYROIDISM: ICD-10-CM

## 2022-08-12 LAB
T3FREE SERPL-MCNC: 2.53 PG/ML (ref 2–4.4)
T4 FREE SERPL-MCNC: 1.84 NG/DL (ref 0.93–1.7)
TSH SERPL DL<=0.005 MIU/L-ACNC: 0.17 UIU/ML (ref 0.38–5.33)

## 2022-08-12 PROCEDURE — 36415 COLL VENOUS BLD VENIPUNCTURE: CPT

## 2022-08-12 PROCEDURE — 84481 FREE ASSAY (FT-3): CPT

## 2022-08-12 PROCEDURE — 84439 ASSAY OF FREE THYROXINE: CPT

## 2022-08-12 PROCEDURE — 84443 ASSAY THYROID STIM HORMONE: CPT

## 2022-08-17 ENCOUNTER — OFFICE VISIT (OUTPATIENT)
Dept: MEDICAL GROUP | Facility: IMAGING CENTER | Age: 37
End: 2022-08-17
Payer: OTHER MISCELLANEOUS

## 2022-08-17 VITALS
OXYGEN SATURATION: 98 % | BODY MASS INDEX: 22.48 KG/M2 | WEIGHT: 157 LBS | RESPIRATION RATE: 18 BRPM | DIASTOLIC BLOOD PRESSURE: 88 MMHG | HEIGHT: 70 IN | HEART RATE: 91 BPM | SYSTOLIC BLOOD PRESSURE: 138 MMHG | TEMPERATURE: 98.4 F

## 2022-08-17 DIAGNOSIS — T78.2XXD ANAPHYLAXIS, SUBSEQUENT ENCOUNTER: ICD-10-CM

## 2022-08-17 DIAGNOSIS — E03.9 ACQUIRED HYPOTHYROIDISM: ICD-10-CM

## 2022-08-17 DIAGNOSIS — J45.20 MILD INTERMITTENT ASTHMA WITHOUT COMPLICATION: ICD-10-CM

## 2022-08-17 PROCEDURE — 99214 OFFICE O/P EST MOD 30 MIN: CPT | Performed by: FAMILY MEDICINE

## 2022-08-17 RX ORDER — ALBUTEROL SULFATE 90 UG/1
2 AEROSOL, METERED RESPIRATORY (INHALATION) EVERY 6 HOURS PRN
Qty: 8.5 G | Refills: 1 | Status: SHIPPED | OUTPATIENT
Start: 2022-08-17 | End: 2023-10-12 | Stop reason: SDUPTHER

## 2022-08-17 RX ORDER — LEVOTHYROXINE SODIUM 0.15 MG/1
150 TABLET ORAL
Qty: 60 TABLET | Refills: 0 | Status: SHIPPED | OUTPATIENT
Start: 2022-08-17 | End: 2022-10-12 | Stop reason: SDUPTHER

## 2022-08-17 RX ORDER — EPINEPHRINE 0.3 MG/.3ML
INJECTION SUBCUTANEOUS
Qty: 1 EACH | Refills: 0 | Status: SHIPPED | OUTPATIENT
Start: 2022-08-17 | End: 2023-08-28 | Stop reason: SDUPTHER

## 2022-08-17 RX ORDER — FLUTICASONE PROPIONATE 220 UG/1
1 AEROSOL, METERED RESPIRATORY (INHALATION) 2 TIMES DAILY
Qty: 12 G | Refills: 1 | Status: SHIPPED | OUTPATIENT
Start: 2022-08-17

## 2022-08-17 ASSESSMENT — FIBROSIS 4 INDEX: FIB4 SCORE: 0.54

## 2022-08-17 NOTE — PROGRESS NOTES
Chief Complaint   Patient presents with    Other     Lab review     HPI:   With a history of   Patient Active Problem List   Diagnosis    Encounter for initial prescription of contraceptive pills    Attention deficit hyperactivity disorder    Acquired hypothyroidism    Anxiety    Mild intermittent asthma without complication     Here to review abnormal thyroid labs. Ws traveling prior to lab draw to [place]. Though stuck to her regimen the best she could. She is now on levothyroxine 175mcg. Did have an anaphylactic reaction on her trip and her epi pens are .  Patient reports that she does get a cold on occasion not very often but it does take her longer to get over the cold because of her asthma.  She usually uses her albuterol inhaler for this and had to use it just before coming into the office today which is why her blood pressure and heart rate are just a little bit elevated.  But she otherwise feels well.    Allergies   Allergen Reactions    Eggs Anaphylaxis    Shellfish Allergy Anaphylaxis    Wheat Bran Anaphylaxis    Ibuprofen Anaphylaxis    Tree Nuts Food Allergy Shortness of Breath     Current Outpatient Medications   Medication Sig Dispense Refill    levothyroxine (SYNTHROID) 150 MCG Tab Take 1 Tablet by mouth every morning on an empty stomach. 60 Tablet 0    albuterol 108 (90 Base) MCG/ACT Aero Soln inhalation aerosol Inhale 2 Puffs every 6 hours as needed for Shortness of Breath. 8.5 g 1    EPINEPHrine (EPIPEN) 0.3 MG/0.3ML Solution Auto-injector solution for injection Inject 0.3 mL into the thigh one time for 1 dose. 1 Each 0    fluticasone (FLOVENT HFA) 220 MCG/ACT Aerosol Inhale 1 Puff 2 times a day. 12 g 1    montelukast (SINGULAIR) 10 MG Tab TAKE ONE TABLET BY MOUTH DAILY 90 Tablet 3    Multiple Vitamin (MULTIVITAMIN PO) Take  by mouth.      amphetamine-dextroamphetamine (ADDERALL XR) 20 MG per XR capsule       amphetamine-dextroamphetamine (ADDERALL) 10 MG Tab       Spacer/Aero-Hold Chamber  "Bags Misc Dispense 1 spacer for albuterol MDI. 1 Each 0    Blood Glucose Meter Kit Test blood sugar as recommended by provider when near-syncopal for hypoglycemia. One Touch Verio blood glucose monitoring kit. 1 Kit 0    Blood Glucose Test Strips Use one One Touch Verio Flex strip to test blood sugar once daily . 30 Each 1    Lancets Use one One Touch Verio Flex lancet to test blood sugar once daily . 100 Each 0    Alcohol Swabs Wipe site with prep pad prior to injection. 100 Each 0    MAGNESIUM PO Take  by mouth.      DIGESTIVE ENZYMES PO Take  by mouth.      Probiotic Product (PROBIOTIC PO) Take  by mouth.       No current facility-administered medications for this visit.     Social History     Tobacco Use    Smoking status: Never    Smokeless tobacco: Never   Vaping Use    Vaping Use: Never used   Substance Use Topics    Alcohol use: Yes     Comment: rarely    Drug use: Yes     Types: Marijuana, Oral     Comment:  a few times a month/occ     Family History   Problem Relation Age of Onset    Osteoporosis Mother     No Known Problems Father        /88 (BP Location: Left arm, Patient Position: Sitting, BP Cuff Size: Adult)   Pulse 91   Temp 36.9 °C (98.4 °F) (Skin)   Resp 18   Ht 1.778 m (5' 10\")   Wt 71.2 kg (157 lb)   SpO2 98%  Body mass index is 22.53 kg/m².  Review of Systems   Constitutional: Negative for chills, fever and malaise/fatigue.   HENT: Negative for hearing loss and tinnitus.    Eyes: Negative for double vision and pain.   Respiratory: Negative for cough, shortness of breath and wheezing.    Cardiovascular: Negative for chest pain, palpitations and leg swelling.   Gastrointestinal: Negative for abdominal pain, diarrhea, nausea and vomiting.   Genitourinary: Negative for dysuria and frequency.   Musculoskeletal: Negative for joint pain and myalgias.   Skin: Negative for itching and rash.   Neurological: Negative for dizziness and headaches.     Physical Exam  Constitutional:       General: " He is not in acute distress.     Appearance: He is not diaphoretic.   HENT:      Head: Normocephalic and atraumatic.   Eyes:      General: No scleral icterus.        Right eye: No discharge.         Left eye: No discharge.      Conjunctiva/sclera: Conjunctivae normal.      Pupils: Pupils are equal, round, and reactive to light.   Neck:      Thyroid: No thyromegaly.   Pulmonary:      Effort: Pulmonary effort is normal. No respiratory distress.   Abdominal:      General: There is no distension.   Skin:     General: Skin is warm and dry.   Neurological:      Mental Status: He is alert.   Psychiatric:         Mood and Affect: Affect normal.         Judgment: Judgment normal.         All labs (last 1 month):   Hospital Outpatient Visit on 08/12/2022   Component Date Value Ref Range Status    T3,Free 08/12/2022 2.53  2.00 - 4.40 pg/mL Final    Free T-4 08/12/2022 1.84 (A) 0.93 - 1.70 ng/dL Final    TSH 08/12/2022 0.170 (A) 0.380 - 5.330 uIU/mL Final    Comment: Reference Range:    Pregnant Females, 1st Trimester  0.050-3.700  Pregnant Females, 2nd Trimester  0.310-4.350  Pregnant Females, 3rd Trimester  0.410-5.180         Lipids:   Lab Results   Component Value Date/Time    CHOLSTRLTOT 145 02/11/2022 06:33 AM    TRIGLYCERIDE 33 02/11/2022 06:33 AM    HDL 79 02/11/2022 06:33 AM    LDL 59 02/11/2022 06:33 AM       Imaging: No results found.    A/P  Since steroid inhaler for use during colds and for mild asthma exacerbation.  Patient encouraged to return to office if symptoms do not resolve with use of inhaler.  She will switch from 1 75-1 50 levothyroxine and obtain thyroid labs in 6 weeks.  Return in about 7 weeks (around 10/5/2022).    Problem List Items Addressed This Visit       Acquired hypothyroidism    Relevant Medications    levothyroxine (SYNTHROID) 150 MCG Tab    Other Relevant Orders    TSH WITH REFLEX TO FT4    US-THYROID    ANTITHYROGLOBULIN AB    CRP QUANTITIVE (NON-CARDIAC)    Sed Rate    THYROID PEROX AB  TPO (REFLEX)    TSH RECEPTOR ANTIBODY    Mild intermittent asthma without complication    Relevant Medications    albuterol 108 (90 Base) MCG/ACT Aero Soln inhalation aerosol    fluticasone (FLOVENT HFA) 220 MCG/ACT Aerosol     Other Visit Diagnoses       Anaphylaxis, subsequent encounter        Relevant Medications    EPINEPHrine (EPIPEN) 0.3 MG/0.3ML Solution Auto-injector solution for injection            Portions of this note may be dictated using Dragon NaturallySpeaking voice recognition software.  Variances in spelling and vocabulary are possible and unintentional.  Not all areas may be caught/corrected.  Please notify me if any discrepancies are noted or if the meaning of any statement is not correct/clear.

## 2022-09-30 ENCOUNTER — HOSPITAL ENCOUNTER (OUTPATIENT)
Dept: LAB | Facility: MEDICAL CENTER | Age: 37
End: 2022-09-30
Attending: FAMILY MEDICINE
Payer: OTHER MISCELLANEOUS

## 2022-09-30 DIAGNOSIS — E03.9 ACQUIRED HYPOTHYROIDISM: ICD-10-CM

## 2022-09-30 LAB
CRP SERPL HS-MCNC: <0.3 MG/DL (ref 0–0.75)
ERYTHROCYTE [SEDIMENTATION RATE] IN BLOOD BY WESTERGREN METHOD: 3 MM/HOUR (ref 0–25)
T3FREE SERPL-MCNC: 1.78 PG/ML (ref 2–4.4)
T4 FREE SERPL-MCNC: 1.54 NG/DL (ref 0.93–1.7)
THYROPEROXIDASE AB SERPL-ACNC: 121 IU/ML (ref 0–9)
TSH SERPL DL<=0.005 MIU/L-ACNC: 1.24 UIU/ML (ref 0.38–5.33)

## 2022-09-30 PROCEDURE — 84481 FREE ASSAY (FT-3): CPT

## 2022-09-30 PROCEDURE — 86376 MICROSOMAL ANTIBODY EACH: CPT

## 2022-09-30 PROCEDURE — 86800 THYROGLOBULIN ANTIBODY: CPT

## 2022-09-30 PROCEDURE — 36415 COLL VENOUS BLD VENIPUNCTURE: CPT

## 2022-09-30 PROCEDURE — 84443 ASSAY THYROID STIM HORMONE: CPT

## 2022-09-30 PROCEDURE — 86140 C-REACTIVE PROTEIN: CPT

## 2022-09-30 PROCEDURE — 84439 ASSAY OF FREE THYROXINE: CPT

## 2022-09-30 PROCEDURE — 83520 IMMUNOASSAY QUANT NOS NONAB: CPT

## 2022-09-30 PROCEDURE — 85652 RBC SED RATE AUTOMATED: CPT

## 2022-10-05 LAB
THYROGLOB AB SERPL-ACNC: <0.9 IU/ML (ref 0–4)
TSH RECEP AB SER-ACNC: 2.54 IU/L

## 2022-10-07 ENCOUNTER — OFFICE VISIT (OUTPATIENT)
Dept: MEDICAL GROUP | Facility: IMAGING CENTER | Age: 37
End: 2022-10-07
Payer: OTHER MISCELLANEOUS

## 2022-10-07 VITALS
SYSTOLIC BLOOD PRESSURE: 102 MMHG | OXYGEN SATURATION: 98 % | BODY MASS INDEX: 22.82 KG/M2 | TEMPERATURE: 98.5 F | RESPIRATION RATE: 16 BRPM | HEIGHT: 70 IN | HEART RATE: 80 BPM | WEIGHT: 159.4 LBS | DIASTOLIC BLOOD PRESSURE: 78 MMHG

## 2022-10-07 DIAGNOSIS — E05.80 THYROTROPIN OVERPRODUCTION: ICD-10-CM

## 2022-10-07 DIAGNOSIS — R22.1 LUMP IN NECK: ICD-10-CM

## 2022-10-07 DIAGNOSIS — E03.9 ACQUIRED HYPOTHYROIDISM: ICD-10-CM

## 2022-10-07 DIAGNOSIS — E05.80: ICD-10-CM

## 2022-10-07 DIAGNOSIS — R79.89 LOW SERUM TRIIODOTHYRONINE (T3): ICD-10-CM

## 2022-10-07 DIAGNOSIS — R51.9 NEW ONSET OF HEADACHES: ICD-10-CM

## 2022-10-07 PROCEDURE — 99214 OFFICE O/P EST MOD 30 MIN: CPT

## 2022-10-07 RX ORDER — LIOTHYRONINE SODIUM 5 UG/1
5 TABLET ORAL DAILY
Qty: 30 TABLET | Refills: 0 | Status: SHIPPED
Start: 2022-10-07 | End: 2022-10-27

## 2022-10-07 ASSESSMENT — FIBROSIS 4 INDEX: FIB4 SCORE: 0.54

## 2022-10-07 ASSESSMENT — PAIN SCALES - GENERAL: PAINLEVEL: NO PAIN

## 2022-10-07 NOTE — PROGRESS NOTES
Chief Complaint   Patient presents with    Establish Care    Lab Results       HISTORY OF THE PRESENT ILLNESS: Patient is a 37 y.o. female. This pleasant patient is here today     To establish care  Previous PCP Dr. Senior    Acquired hypothyroidism  Established issue. Patient currently on levothyroxine 150 mcg daily.  Recent TSH 1.24, T3 1.78. TpO 121 and TRAb 2.54. Patient reports of having stable TSH for years; however, within the last year her TSH has been unstable and difficult to manage. She has also developed symptoms of severe frontal headaches that is new, feels that her emotions has been more unbalanced, having weight gain with generalized swelling, hair thinning, and worsening brain fog. She reports that these symptoms are worse during her menses.  Patient states of recently noticing that she has been having some slight difficulty with pincer  of her left hand at times.   Patient mentions that her symptoms appeared after she had Covid-19 infection. Patient states she will be scheduling an appointment to establish care with [facility].  She saw her ophthalmologist sometime in Spring with unremarkable findings.      Allergies: Eggs, Shellfish allergy, Wheat bran, Ibuprofen, and Tree nuts food allergy    Current Outpatient Medications Ordered in Epic   Medication Sig Dispense Refill    Cetirizine HCl (ZYRTEC ALLERGY PO) Take  by mouth.      liothyronine (CYTOMEL) 5 MCG Tab Take 1 Tablet by mouth every day. 30 Tablet 0    levothyroxine (SYNTHROID) 150 MCG Tab Take 1 Tablet by mouth every morning on an empty stomach. 60 Tablet 0    albuterol 108 (90 Base) MCG/ACT Aero Soln inhalation aerosol Inhale 2 Puffs every 6 hours as needed for Shortness of Breath. 8.5 g 1    EPINEPHrine (EPIPEN) 0.3 MG/0.3ML Solution Auto-injector solution for injection Inject 0.3 mL into the thigh one time for 1 dose. 1 Each 0    fluticasone (FLOVENT HFA) 220 MCG/ACT Aerosol Inhale 1 Puff 2 times a day. 12 g 1     montelukast (SINGULAIR) 10 MG Tab TAKE ONE TABLET BY MOUTH DAILY 90 Tablet 3    amphetamine-dextroamphetamine (ADDERALL XR) 20 MG per XR capsule       amphetamine-dextroamphetamine (ADDERALL) 10 MG Tab       Spacer/Aero-Hold Chamber Bags Misc Dispense 1 spacer for albuterol MDI. 1 Each 0    Blood Glucose Meter Kit Test blood sugar as recommended by provider when near-syncopal for hypoglycemia. One Touch Verio blood glucose monitoring kit. 1 Kit 0    Blood Glucose Test Strips Use one One Touch Verio Flex strip to test blood sugar once daily . 30 Each 1    Lancets Use one One Touch Verio Flex lancet to test blood sugar once daily . 100 Each 0    Alcohol Swabs Wipe site with prep pad prior to injection. 100 Each 0    MAGNESIUM PO Take  by mouth.      DIGESTIVE ENZYMES PO Take  by mouth.      Probiotic Product (PROBIOTIC PO) Take  by mouth.       No current Epic-ordered facility-administered medications on file.       Past Medical History:   Diagnosis Date    ADHD     Allergy     Anxiety     Depression     Hashimoto's disease     Hyperthyroidism     Restless leg syndrome        Past Surgical History:   Procedure Laterality Date    MAMMOPLASTY AUGMENTATION  2011       Social History     Tobacco Use    Smoking status: Never    Smokeless tobacco: Never   Vaping Use    Vaping Use: Never used   Substance Use Topics    Alcohol use: Yes     Comment: rarely    Drug use: Yes     Types: Marijuana, Oral     Comment:  a few times a month/occ       Social History     Social History Narrative    Not on file       Family History   Problem Relation Age of Onset    Osteoporosis Mother     No Known Problems Father        ROS:     - Constitutional: Negative for fever, chills, malaise, generalized weakness. Positive: weight gain and fatigue    - HEENT: Negative vision changes, hearing changes, ear pain, ear discharge, rhinorrhea, sinus congestion, sore throat, and neck pain.      - Respiratory: Negative for cough, sputum production, chest  "congestion, dyspnea, wheezing, and crackles.      - Cardiovascular: Negative for chest pain, palpitations, orthopnea, and bilateral lower extremity edema.     - Gastrointestinal: Negative for heartburn, nausea, vomiting, abdominal pain, hematochezia, melena, diarrhea, constipation, and greasy/foul-smelling stools.     - Genitourinary: Negative for dysuria, polyuria, hematuria, pyuria, urinary urgency, and urinary incontinence.     - Musculoskeletal: Negative for myalgias, back pain, and joint pain.     - Skin: Negative for rash, itching, cyanotic skin color change. Hair thinning, hair loss.    - Neurological: Negative for dizziness, tingling, tremors, focal sensory deficit.  Severe frontal headache, decreased pincer  coordination    - Endo/Heme/Allergies: Does not bruise/bleed easily.     - Psychiatric/Behavioral: Negative for depression, suicidal/homicidal ideation and memory loss.         Exam: /78 (BP Location: Left arm, Patient Position: Sitting, BP Cuff Size: Adult)   Pulse 80   Temp 36.9 °C (98.5 °F) (Temporal)   Resp 16   Ht 1.778 m (5' 10\")   Wt 72.3 kg (159 lb 6.4 oz)   SpO2 98%  Body mass index is 22.87 kg/m².    General: Normal appearing. No distress.  HEENT: Normocephalic. Eyes conjunctiva clear lids without ptosis, ears normal shape and contour,nasal mucosa benign, oropharynx is without erythema, edema or exudates.   Neck: Supple. Lump palpated left neck area.   Pulmonary: Clear to ausculation.  Normal effort. No rales, ronchi, or wheezing.  Cardiovascular: Regular rate and rhythm without murmur. Carotid and radial pulses are intact and equal bilaterally.  Abdomen: Soft, nontender, nondistended. Normal bowel sounds.  Neurologic: Grossly nonfocal  Mental Status: Speech fluent without errors. Follows all commands. No dysarthria or apraxia.  Cranial Nerves: Pupils equal round and reactive to light. Extraocular motion intact. Visual fields intact. No nystagmus. CN V1-V3 intact to light " touch. No facial asymmetry. Hearing clinically intact bilaterally. Tongue protrusion midline. No uvular deviation. Normal shoulder shrug and head turn.  Motor:  RUE: 5/5 with hand , 5/5 with flexion at the elbow 5/5 with extension at the elbow  LUE: 5/5 with hand , 5/5 with flexion at the elbow 5/5 with extension at the elbow  RLE: 5/5 with leg raise, 5/5 with plantar flexion, 5/5 with dorsal flexion  LLE: 5/5 with leg raise, 5/5 with plantar flexion, 5/5 with dorsal flexion  Sensation to light touch intact throughout  No ataxia noted  Rapidly alternating movements without difficulty   Lymph: No cervical, supraclavicular lymph nodes are palpable  Skin: Warm and dry.  No obvious lesions.  Musculoskeletal: Normal gait. No extremity cyanosis, clubbing, or edema.  Psych: Normal mood and affect. Alert and oriented x3. Judgment and insight is normal.    Please note that this dictation was created using voice recognition software. I have made every reasonable attempt to correct obvious errors, but I expect that there are errors of grammar and possibly content that I did not discover before finalizing the note.      Assessment/Plan    37 y.o. female with the following -    1. Thyrotropin overproduction  4. New onset of headaches  Ongoing and worsening issue for almost a year. Patient presents with elevated TRAb and TSH levels. Patient symptomatic: new onset of headaches, labile mood, weight gain, hair thinning, brain fog, and decreased fine motor skills of left hand (occasionally); her symptoms are worse around menstrual cycle.   Patient presentation concerning for Graves disease, uncontrolled Hashimoto's hypothyroid, pituitary hyperplasia, or perimenopausal stage to early onset menopausal stage.   Patient's vital signs are within normal range. No s/s of urgency i.e. thyrotoxicity to warrant ED visit. Pt is hemodynamically stable.  Will order labs to rule out.  US to assess thyroid ordered.   Recommend to follow up  with [facility].   Will order MRI to r/o pituitary hyperplasia.  ED symptoms discussed.     - PROLACTIN; Future  - PTH WITH CALCIUM; Future  - ACTH; Future  - FSH/LH; Future  - TESTOSTERONE, FREE AND TOTAL; Future  - ESTRADIOL; Future  - US-THYROID; Future  - MR-BRAIN PITUITARY W/WO; Future    2. Low serum triiodothyronine (T3)  3. Lump in neck  5. Acquired hypothyroidism  Established condition. Currently on levothyroxine. TSH therapeutic; however, T3 is low. Patient symptomatic. Admits to taking cytomel in the past with symptom improvement. Reports of having severe goiter in the past. Presents today with palpable lump on right neck, will order US to evaluate. Will restart Cytomel to assess if her symptoms would improve. Advised patient to follow up with [facility] for evaluation and management.     - liothyronine (CYTOMEL) 5 MCG Tab; Take 1 Tablet by mouth every day.  Dispense: 30 Tablet; Refill: 0  - US-THYROID; Future    Medical Decision Making/Course:  In the course of preparing for this visit with review of the pertinent past medical history, recent and past clinic visits, current medications, and performing chart, immunization, medical history and medication reconciliation, and in the further course of obtaining the current history pertinent to the clinic visit today, performing an exam and evaluation, ordering and independently evaluating labs, tests, and/or procedures, prescribing any recommended new medications as noted above, providing any pertinent counseling and education and recommending further coordination of care. This was discussed with patient in a shared-decision making conversation, and they understand and agreed with plan of care.       Return in about 4 weeks (around 11/4/2022), or if symptoms worsen or fail to improve, for f/u labs.    Thank you, Bisi ALICIA  [facility]      Please note that this dictation was created using voice recognition  software. I have made every reasonable attempt to correct obvious errors, but I expect that there are errors of grammar and possibly content that I did not discover before finalizing the note.

## 2022-10-08 ENCOUNTER — HOSPITAL ENCOUNTER (OUTPATIENT)
Dept: LAB | Facility: MEDICAL CENTER | Age: 37
End: 2022-10-08
Payer: OTHER MISCELLANEOUS

## 2022-10-08 DIAGNOSIS — E05.80 THYROTROPIN OVERPRODUCTION: ICD-10-CM

## 2022-10-08 LAB
CALCIUM SERPL-MCNC: 9.1 MG/DL (ref 8.5–10.5)
ESTRADIOL SERPL-MCNC: 129 PG/ML
FSH SERPL-ACNC: 4 MIU/ML
LH SERPL-ACNC: 6.9 IU/L
PROLACTIN SERPL-MCNC: 13.5 NG/ML (ref 2.8–26)
PTH-INTACT SERPL-MCNC: 56 PG/ML (ref 14–72)

## 2022-10-08 PROCEDURE — 84146 ASSAY OF PROLACTIN: CPT

## 2022-10-08 PROCEDURE — 84403 ASSAY OF TOTAL TESTOSTERONE: CPT

## 2022-10-08 PROCEDURE — 83001 ASSAY OF GONADOTROPIN (FSH): CPT

## 2022-10-08 PROCEDURE — 82670 ASSAY OF TOTAL ESTRADIOL: CPT

## 2022-10-08 PROCEDURE — 36415 COLL VENOUS BLD VENIPUNCTURE: CPT

## 2022-10-08 PROCEDURE — 83970 ASSAY OF PARATHORMONE: CPT

## 2022-10-08 PROCEDURE — 84270 ASSAY OF SEX HORMONE GLOBUL: CPT

## 2022-10-08 PROCEDURE — 84402 ASSAY OF FREE TESTOSTERONE: CPT

## 2022-10-08 PROCEDURE — 82024 ASSAY OF ACTH: CPT

## 2022-10-08 PROCEDURE — 83002 ASSAY OF GONADOTROPIN (LH): CPT

## 2022-10-11 ENCOUNTER — PATIENT MESSAGE (OUTPATIENT)
Dept: MEDICAL GROUP | Facility: IMAGING CENTER | Age: 37
End: 2022-10-11
Payer: OTHER MISCELLANEOUS

## 2022-10-11 DIAGNOSIS — E03.9 ACQUIRED HYPOTHYROIDISM: ICD-10-CM

## 2022-10-12 LAB — ACTH PLAS-MCNC: 6.1 PG/ML (ref 7.2–63.3)

## 2022-10-12 RX ORDER — LEVOTHYROXINE SODIUM 0.15 MG/1
150 TABLET ORAL
Qty: 60 TABLET | Refills: 0 | Status: SHIPPED | OUTPATIENT
Start: 2022-10-12 | End: 2022-10-13 | Stop reason: SDUPTHER

## 2022-10-13 DIAGNOSIS — E03.9 ACQUIRED HYPOTHYROIDISM: ICD-10-CM

## 2022-10-13 RX ORDER — LEVOTHYROXINE SODIUM 0.15 MG/1
150 TABLET ORAL
Qty: 60 TABLET | Refills: 0 | Status: SHIPPED | OUTPATIENT
Start: 2022-10-13 | End: 2023-02-13

## 2022-10-15 LAB
SHBG SERPL-SCNC: 127 NMOL/L (ref 25–122)
TESTOST FREE SERPL-MCNC: 1.4 PG/ML (ref 1.3–9.2)
TESTOST SERPL-MCNC: 22 NG/DL (ref 9–55)

## 2022-10-24 ENCOUNTER — HOSPITAL ENCOUNTER (OUTPATIENT)
Facility: MEDICAL CENTER | Age: 37
End: 2022-10-24
Payer: OTHER MISCELLANEOUS

## 2022-10-24 ENCOUNTER — OFFICE VISIT (OUTPATIENT)
Dept: MEDICAL GROUP | Facility: IMAGING CENTER | Age: 37
End: 2022-10-24
Payer: OTHER MISCELLANEOUS

## 2022-10-24 VITALS
SYSTOLIC BLOOD PRESSURE: 120 MMHG | DIASTOLIC BLOOD PRESSURE: 78 MMHG | HEART RATE: 74 BPM | RESPIRATION RATE: 20 BRPM | TEMPERATURE: 98.2 F | BODY MASS INDEX: 22.94 KG/M2 | HEIGHT: 70 IN | WEIGHT: 160.2 LBS | OXYGEN SATURATION: 98 %

## 2022-10-24 DIAGNOSIS — B37.9 YEAST INFECTION: ICD-10-CM

## 2022-10-24 LAB
APPEARANCE UR: CLEAR
BILIRUB UR STRIP-MCNC: NORMAL MG/DL
COLOR UR AUTO: YELLOW
GLUCOSE UR STRIP.AUTO-MCNC: NORMAL MG/DL
KETONES UR STRIP.AUTO-MCNC: NORMAL MG/DL
LEUKOCYTE ESTERASE UR QL STRIP.AUTO: NORMAL
NITRITE UR QL STRIP.AUTO: NORMAL
PH UR STRIP.AUTO: 6.5 [PH] (ref 5–8)
PROT UR QL STRIP: NORMAL MG/DL
RBC UR QL AUTO: NORMAL
SP GR UR STRIP.AUTO: 1.01
UROBILINOGEN UR STRIP-MCNC: 0.2 MG/DL

## 2022-10-24 PROCEDURE — 99213 OFFICE O/P EST LOW 20 MIN: CPT

## 2022-10-24 PROCEDURE — 87660 TRICHOMONAS VAGIN DIR PROBE: CPT

## 2022-10-24 PROCEDURE — 87510 GARDNER VAG DNA DIR PROBE: CPT

## 2022-10-24 PROCEDURE — 87480 CANDIDA DNA DIR PROBE: CPT

## 2022-10-24 PROCEDURE — 81002 URINALYSIS NONAUTO W/O SCOPE: CPT

## 2022-10-24 RX ORDER — FLUCONAZOLE 150 MG/1
150 TABLET ORAL
Qty: 3 TABLET | Refills: 1 | Status: SHIPPED | OUTPATIENT
Start: 2022-10-24 | End: 2022-10-27

## 2022-10-24 ASSESSMENT — PAIN SCALES - GENERAL: PAINLEVEL: NO PAIN

## 2022-10-24 ASSESSMENT — FIBROSIS 4 INDEX: FIB4 SCORE: 0.54

## 2022-10-24 NOTE — PROGRESS NOTES
Subjective:     CC:   Chief Complaint   Patient presents with    UTI     Pt stated x1 week discharge , burning and itching       HPI:   Juanita presents today to discuss:    Vaginal yeast infection  Patient reports of having personal history of chronic and recurrent yeast infections.  Patient developed symptoms of dyspareunia,  thick white cure-like vaginal discharge, and pruritis. Patient reports of receiving rx for Diflucan 150 mg x 1 via [facility] and has been using OTC Terconazole ointment with some relief; however, not fully resolved.   She has now developed vaginal fissures, has noticed blood on toilet paper when wiping, and burning sensation with urination.   Denies fevers, pelvic pain, foul-smelling vaginal discharge.   Denies urinary frequency, urgency, hematuria, or flank pain.  Denies risk of STI.    Past Medical History:   Diagnosis Date    ADHD     Allergy     Anxiety     Depression     Hashimoto's disease     Hyperthyroidism     Restless leg syndrome      Family History   Problem Relation Age of Onset    Osteoporosis Mother     No Known Problems Father      Past Surgical History:   Procedure Laterality Date    MAMMOPLASTY AUGMENTATION  2011     Social History     Tobacco Use    Smoking status: Never    Smokeless tobacco: Never   Vaping Use    Vaping Use: Never used   Substance Use Topics    Alcohol use: Yes     Comment: rarely    Drug use: Yes     Types: Marijuana, Oral     Comment:  a few times a month/occ     Social History     Social History Narrative    Not on file     Current Outpatient Medications Ordered in Epic   Medication Sig Dispense Refill    fluconazole (DIFLUCAN) 150 MG tablet Take 1 Tablet by mouth every 72 hours for 3 days. 3 Tablet 1    levothyroxine (SYNTHROID) 150 MCG Tab Take 1 Tablet by mouth every morning on an empty stomach. 60 Tablet 0    Cetirizine HCl (ZYRTEC ALLERGY PO) Take  by mouth.      albuterol 108 (90 Base) MCG/ACT Aero Soln inhalation aerosol Inhale 2 Puffs every 6  "hours as needed for Shortness of Breath. 8.5 g 1    EPINEPHrine (EPIPEN) 0.3 MG/0.3ML Solution Auto-injector solution for injection Inject 0.3 mL into the thigh one time for 1 dose. 1 Each 0    fluticasone (FLOVENT HFA) 220 MCG/ACT Aerosol Inhale 1 Puff 2 times a day. 12 g 1    montelukast (SINGULAIR) 10 MG Tab TAKE ONE TABLET BY MOUTH DAILY 90 Tablet 3    amphetamine-dextroamphetamine (ADDERALL XR) 20 MG per XR capsule       amphetamine-dextroamphetamine (ADDERALL) 10 MG Tab       Spacer/Aero-Hold Chamber Bags Misc Dispense 1 spacer for albuterol MDI. 1 Each 0    Blood Glucose Meter Kit Test blood sugar as recommended by provider when near-syncopal for hypoglycemia. One Touch Verio blood glucose monitoring kit. 1 Kit 0    Blood Glucose Test Strips Use one One Touch Verio Flex strip to test blood sugar once daily . 30 Each 1    Lancets Use one One Touch Verio Flex lancet to test blood sugar once daily . 100 Each 0    Alcohol Swabs Wipe site with prep pad prior to injection. 100 Each 0    MAGNESIUM PO Take  by mouth.      DIGESTIVE ENZYMES PO Take  by mouth.      Probiotic Product (PROBIOTIC PO) Take  by mouth.      liothyronine (CYTOMEL) 5 MCG Tab Take 1 Tablet by mouth every day. (Patient not taking: Reported on 10/24/2022) 30 Tablet 0     No current Epic-ordered facility-administered medications on file.     Eggs, Shellfish allergy, Wheat bran, Ibuprofen, and Tree nuts food allergy    ROS: see hpi  Gen: no fevers/chills  Pulm: no sob, no cough  CV: no chest pain, no palpitations, no edema  GI: no nausea/vomiting, no diarrhea  Skin: no rash    Objective:   Exam:  /78   Pulse 74   Temp 36.8 °C (98.2 °F) (Temporal)   Resp 20   Ht 1.778 m (5' 10\") Comment: pt stated  Wt 72.7 kg (160 lb 3.2 oz)   LMP 09/29/2022 (Exact Date)   SpO2 98%   BMI 22.99 kg/m²    Body mass index is 22.99 kg/m².    Gen: Alert and oriented, No apparent distress.  HEENT: Head atraumatic, normocephalic. Pupils equal and " round.  Neck: Neck is supple without lymphadenopathy.   Lungs: Normal effort, CTA bilaterally, no wheezes, rhonchi, or rales  CV: Regular rate and rhythm. No murmurs, rubs, or gallops.  ABD: +BS. Non-tender, non-distended. No rebound, rigidity, or guarding.  Ext: No clubbing, cyanosis, edema.    Assessment & Plan:     37 y.o. female with the following -     1. Yeast infection  Patient presentation likely Candida vaginitis.  Patient reports of having personal history of this.  Patient has taken Diflucan x1 dose and over-the-counter treatment with minimal relief.  Urinalysis negative for UTI.  Will send vaginal pathogen to lab.  Will treat with Diflucan, instructed to take 1 tablet every 72 hours for 3 days.  May continue over-the-counter medication for this.  If symptoms do not improve, will discuss extending treatment to 14 days versus 1 tablet once a week for 6 months.  Patient agreeable to this    - fluconazole (DIFLUCAN) 150 MG tablet; Take 1 Tablet by mouth every 72 hours for 3 days.  Dispense: 3 Tablet; Refill: 1  - POCT Urinalysis  - VAGINAL PATHOGENS DNA PANEL; Future'    Medical Decision Making/Course:  In the course of preparing for this visit with review of the pertinent past medical history, recent and past clinic visits, current medications, and performing chart, immunization, medical history and medication reconciliation, and in the further course of obtaining the current history pertinent to the clinic visit today, performing an exam and evaluation, ordering and independently evaluating labs, tests, and/or procedures, prescribing any recommended new medications as noted above, providing any pertinent counseling and education and recommending further coordination of care. This was discussed with patient in a shared-decision making conversation, and they understand and agreed with plan of care.     Return if symptoms worsen or fail to improve.    NOE Romero.   [facility]    Please note that this dictation was created using voice recognition software. I have made every reasonable attempt to correct obvious errors, but I expect that there are errors of grammar and possibly content that I did not discover before finalizing the note.

## 2022-10-25 DIAGNOSIS — B37.9 YEAST INFECTION: ICD-10-CM

## 2022-10-27 ENCOUNTER — OFFICE VISIT (OUTPATIENT)
Dept: MEDICAL GROUP | Facility: IMAGING CENTER | Age: 37
End: 2022-10-27
Payer: OTHER MISCELLANEOUS

## 2022-10-27 VITALS
HEIGHT: 70 IN | OXYGEN SATURATION: 100 % | WEIGHT: 159 LBS | DIASTOLIC BLOOD PRESSURE: 68 MMHG | HEART RATE: 77 BPM | BODY MASS INDEX: 22.76 KG/M2 | TEMPERATURE: 98 F | SYSTOLIC BLOOD PRESSURE: 102 MMHG

## 2022-10-27 DIAGNOSIS — E06.3 HYPOTHYROIDISM DUE TO HASHIMOTO'S THYROIDITIS: ICD-10-CM

## 2022-10-27 DIAGNOSIS — E03.8 HYPOTHYROIDISM DUE TO HASHIMOTO'S THYROIDITIS: ICD-10-CM

## 2022-10-27 PROCEDURE — 99213 OFFICE O/P EST LOW 20 MIN: CPT

## 2022-10-27 ASSESSMENT — FIBROSIS 4 INDEX: FIB4 SCORE: 0.54

## 2022-10-27 NOTE — PROGRESS NOTES
Subjective:     CC:   Chief Complaint   Patient presents with    Follow-Up    Lab Results       HPI:   Juanita presents today to discuss:    Acquired hypothyroidism  Elevated Tpo and TRAb levels  Established condition. Patient presents today to discuss lab and ultrasound results. Patient presented with symptoms concerning for Graves disease, pituitary disorder, and hormonal imbalance. Patient has not been able to establish with [facility].  Lab results unremarkable for conditions mentioned above.   Ultrasound revealed heterogenous thyroid without nodules, bilaterally.  Overall, patient reports that her symptoms are improving.       Past Medical History:   Diagnosis Date    ADHD     Allergy     Anxiety     Depression     Hashimoto's disease     Hyperthyroidism     Restless leg syndrome      Family History   Problem Relation Age of Onset    Osteoporosis Mother     No Known Problems Father      Past Surgical History:   Procedure Laterality Date    MAMMOPLASTY AUGMENTATION  2011     Social History     Tobacco Use    Smoking status: Never    Smokeless tobacco: Never   Vaping Use    Vaping Use: Never used   Substance Use Topics    Alcohol use: Yes     Comment: rarely    Drug use: Yes     Types: Marijuana, Oral     Comment:  a few times a month/occ     Social History     Social History Narrative    Not on file     Current Outpatient Medications Ordered in Epic   Medication Sig Dispense Refill    fluconazole (DIFLUCAN) 150 MG tablet Take 1 Tablet by mouth every 72 hours for 3 days. 3 Tablet 1    levothyroxine (SYNTHROID) 150 MCG Tab Take 1 Tablet by mouth every morning on an empty stomach. 60 Tablet 0    Cetirizine HCl (ZYRTEC ALLERGY PO) Take  by mouth.      albuterol 108 (90 Base) MCG/ACT Aero Soln inhalation aerosol Inhale 2 Puffs every 6 hours as needed for Shortness of Breath. 8.5 g 1    EPINEPHrine (EPIPEN) 0.3 MG/0.3ML Solution Auto-injector solution for injection Inject 0.3 mL into the thigh one time for 1 dose.  "1 Each 0    fluticasone (FLOVENT HFA) 220 MCG/ACT Aerosol Inhale 1 Puff 2 times a day. 12 g 1    montelukast (SINGULAIR) 10 MG Tab TAKE ONE TABLET BY MOUTH DAILY 90 Tablet 3    amphetamine-dextroamphetamine (ADDERALL XR) 20 MG per XR capsule       amphetamine-dextroamphetamine (ADDERALL) 10 MG Tab       Spacer/Aero-Hold Chamber Bags Misc Dispense 1 spacer for albuterol MDI. 1 Each 0    Blood Glucose Meter Kit Test blood sugar as recommended by provider when near-syncopal for hypoglycemia. One Touch Verio blood glucose monitoring kit. 1 Kit 0    Lancets Use one One Touch Verio Flex lancet to test blood sugar once daily . 100 Each 0    Alcohol Swabs Wipe site with prep pad prior to injection. 100 Each 0    MAGNESIUM PO Take  by mouth.      DIGESTIVE ENZYMES PO Take  by mouth.      Probiotic Product (PROBIOTIC PO) Take  by mouth.      Blood Glucose Test Strips Use one One Touch Verio Flex strip to test blood sugar once daily . 30 Each 1     No current [facility]-ordered facility-administered medications on file.     Eggs, Shellfish allergy, Wheat bran, Ibuprofen, and Tree nuts food allergy    ROS: see hpi  Gen: no fevers/chills  Pulm: no sob, no cough  CV: no chest pain, no palpitations, no edema  GI: no nausea/vomiting, no diarrhea  Skin: no rash    Objective:   Exam:  /68   Pulse 77   Temp 36.7 °C (98 °F) (Temporal)   Ht 1.778 m (5' 10\")   Wt 72.1 kg (159 lb)   LMP 09/29/2022 (Exact Date)   SpO2 100%   BMI 22.81 kg/m²    Body mass index is 22.81 kg/m².    Gen: Alert and oriented, No apparent distress.  HEENT: Head atraumatic, normocephalic. Pupils equal and round.  Neck: Neck is supple without lymphadenopathy.   Lungs: Normal effort, CTA bilaterally, no wheezes, rhonchi, or rales  CV: Regular rate and rhythm. No murmurs, rubs, or gallops.  ABD: +BS. Non-tender, non-distended. No rebound, rigidity, or guarding.  Ext: No clubbing, cyanosis, edema.    Assessment & Plan:     37 y.o. female with the following - "     1. Hypothyroidism due to Hashimoto's thyroiditis  Established condition.  Patient's symptoms are improving.  Labs and ultrasound results unremarkable.  Instructed patient to hold off on MRI.  Recommend to continue current regimen.  Advised to follow-up with endocrinology, will place referral.  Will recheck TSH level in 3 months.   For worsening symptoms, advised to follow-up.    - Referral to Endocrinology  - TSH WITH REFLEX TO FT4; Future    Medical Decision Making/Course:  In the course of preparing for this visit with review of the pertinent past medical history, recent and past clinic visits, current medications, and performing chart, immunization, medical history and medication reconciliation, and in the further course of obtaining the current history pertinent to the clinic visit today, performing an exam and evaluation, ordering and independently evaluating labs, tests, and/or procedures, prescribing any recommended new medications as noted above, providing any pertinent counseling and education and recommending further coordination of care. This was discussed with patient in a shared-decision making conversation, and they understand and agreed with plan of care.      Return if symptoms worsen or fail to improve.    NOE Romero.   [facility]    Please note that this dictation was created using voice recognition software. I have made every reasonable attempt to correct obvious errors, but I expect that there are errors of grammar and possibly content that I did not discover before finalizing the note.

## 2022-11-12 ENCOUNTER — HOSPITAL ENCOUNTER (OUTPATIENT)
Dept: LAB | Facility: MEDICAL CENTER | Age: 37
End: 2022-11-12
Payer: OTHER MISCELLANEOUS

## 2022-11-12 DIAGNOSIS — E06.3 HYPOTHYROIDISM DUE TO HASHIMOTO'S THYROIDITIS: ICD-10-CM

## 2022-11-12 DIAGNOSIS — E03.8 HYPOTHYROIDISM DUE TO HASHIMOTO'S THYROIDITIS: ICD-10-CM

## 2022-11-12 LAB — TSH SERPL DL<=0.005 MIU/L-ACNC: 0.84 UIU/ML (ref 0.38–5.33)

## 2022-11-12 PROCEDURE — 84443 ASSAY THYROID STIM HORMONE: CPT

## 2022-11-12 PROCEDURE — 36415 COLL VENOUS BLD VENIPUNCTURE: CPT

## 2022-11-28 ENCOUNTER — OFFICE VISIT (OUTPATIENT)
Dept: URGENT CARE | Facility: CLINIC | Age: 37
End: 2022-11-28
Payer: OTHER MISCELLANEOUS

## 2022-11-28 VITALS
OXYGEN SATURATION: 97 % | WEIGHT: 160 LBS | BODY MASS INDEX: 22.9 KG/M2 | HEIGHT: 70 IN | SYSTOLIC BLOOD PRESSURE: 118 MMHG | TEMPERATURE: 99.4 F | DIASTOLIC BLOOD PRESSURE: 78 MMHG | RESPIRATION RATE: 14 BRPM | HEART RATE: 70 BPM

## 2022-11-28 DIAGNOSIS — J02.9 PHARYNGITIS, UNSPECIFIED ETIOLOGY: ICD-10-CM

## 2022-11-28 DIAGNOSIS — J10.1 INFLUENZA B: ICD-10-CM

## 2022-11-28 LAB
EXTERNAL QUALITY CONTROL: NORMAL
FLUAV+FLUBV AG SPEC QL IA: POSITIVE
INT CON NEG: NEGATIVE
INT CON POS: POSITIVE
S PYO AG THROAT QL: NEGATIVE
SARS-COV+SARS-COV-2 AG RESP QL IA.RAPID: NEGATIVE

## 2022-11-28 PROCEDURE — 99213 OFFICE O/P EST LOW 20 MIN: CPT | Performed by: PHYSICIAN ASSISTANT

## 2022-11-28 PROCEDURE — 87880 STREP A ASSAY W/OPTIC: CPT | Performed by: PHYSICIAN ASSISTANT

## 2022-11-28 PROCEDURE — 87804 INFLUENZA ASSAY W/OPTIC: CPT | Performed by: PHYSICIAN ASSISTANT

## 2022-11-28 PROCEDURE — 87426 SARSCOV CORONAVIRUS AG IA: CPT | Performed by: PHYSICIAN ASSISTANT

## 2022-11-28 RX ORDER — OSELTAMIVIR PHOSPHATE 75 MG/1
75 CAPSULE ORAL 2 TIMES DAILY
Qty: 10 CAPSULE | Refills: 0 | Status: SHIPPED | OUTPATIENT
Start: 2022-11-28 | End: 2022-12-03

## 2022-11-28 ASSESSMENT — ENCOUNTER SYMPTOMS
HEADACHES: 1
VOMITING: 0
DIARRHEA: 0
SHORTNESS OF BREATH: 0
CHILLS: 0
COUGH: 1
EYE PAIN: 0
DIAPHORESIS: 0
FEVER: 0
SORE THROAT: 1
CONSTIPATION: 0
ABDOMINAL PAIN: 0
WHEEZING: 0
SINUS PAIN: 0
EYE DISCHARGE: 0
NAUSEA: 0
DIZZINESS: 0
EYE REDNESS: 0

## 2022-11-28 ASSESSMENT — FIBROSIS 4 INDEX: FIB4 SCORE: 0.54

## 2022-11-28 NOTE — LETTER
[facility]  [address] [id number]     November 28, 2022    Patient: Juanita Monteor   YOB: 1985   Date of Visit: 11/28/2022       To Whom It May Concern:    Juanita Montero was seen and treated in our department on 11/28/2022. Please excuse from school until she is afebrile for 24 hours, can return thereafter.    Sincerely,     Real Bowers P.A.-C.

## 2022-11-28 NOTE — PROGRESS NOTES
"  Subjective:     Juanita Montero  is a 37 y.o. female who presents for Pharyngitis (Headache, ear pain, congestion, head cold x yesterday )       She presents today with pharyngitis, headache, bilateral otalgia, sinus congestion x1 day.  She has been around her son over the Thanksgiving holiday weekend who does have similar symptoms.  No fever/chills/sweats, chest pain, shortness of breath, nausea/vomiting, abdominal pain, diarrhea.  She does have a history of asthma but has not had any worsening of her asthma symptoms.     Review of Systems   Constitutional:  Negative for chills, diaphoresis, fever and malaise/fatigue.   HENT:  Positive for congestion, ear pain and sore throat. Negative for ear discharge and sinus pain.    Eyes:  Negative for pain, discharge and redness.   Respiratory:  Positive for cough. Negative for shortness of breath and wheezing.    Cardiovascular:  Negative for chest pain.   Gastrointestinal:  Negative for abdominal pain, constipation, diarrhea, nausea and vomiting.   Genitourinary:  Negative for dysuria, frequency and urgency.   Neurological:  Positive for headaches. Negative for dizziness.    Allergies   Allergen Reactions    Eggs Anaphylaxis    Shellfish Allergy Anaphylaxis    Wheat Bran Anaphylaxis    Ibuprofen Anaphylaxis    Tree Nuts Food Allergy Shortness of Breath     Past Medical History:   Diagnosis Date    ADHD     Allergy     Anxiety     Depression     Hashimoto's disease     Hyperthyroidism     Restless leg syndrome         Objective:   /78 (BP Location: Left arm, Patient Position: Sitting, BP Cuff Size: Adult)   Pulse 70   Temp 37.4 °C (99.4 °F) (Temporal)   Resp 14   Ht 1.778 m (5' 10\")   Wt 72.6 kg (160 lb)   SpO2 97%   BMI 22.96 kg/m²   Physical Exam  Vitals and nursing note reviewed.   Constitutional:       General: She is not in acute distress.     Appearance: Normal appearance. She is not ill-appearing, toxic-appearing or diaphoretic.   HENT:      " Head: Normocephalic.      Right Ear: Tympanic membrane, ear canal and external ear normal. There is no impacted cerumen.      Left Ear: Tympanic membrane, ear canal and external ear normal. There is no impacted cerumen.      Nose: Congestion present. No rhinorrhea.      Mouth/Throat:      Mouth: Mucous membranes are moist.      Pharynx: No oropharyngeal exudate or posterior oropharyngeal erythema.   Eyes:      General:         Right eye: No discharge.         Left eye: No discharge.      Conjunctiva/sclera: Conjunctivae normal.   Cardiovascular:      Rate and Rhythm: Normal rate and regular rhythm.   Pulmonary:      Effort: Pulmonary effort is normal. No respiratory distress.      Breath sounds: Normal breath sounds. No stridor. No wheezing or rhonchi.   Musculoskeletal:      Cervical back: Neck supple.   Lymphadenopathy:      Cervical: No cervical adenopathy.   Neurological:      General: No focal deficit present.      Mental Status: She is alert and oriented to person, place, and time.   Psychiatric:         Mood and Affect: Mood normal.         Behavior: Behavior normal.         Thought Content: Thought content normal.         Judgment: Judgment normal.           Diagnostic testing:    POC strep-negative    POC influenza-positive influenza B    POC COVID-negative    Assessment/Plan:     Encounter Diagnoses   Name Primary?    Influenza B     Pharyngitis, unspecified etiology           Plan for care for today's complaint includes paper prescription for Tamiflu given today as patient did test positive for influenza B.  Strep test and COVID test were negative.  No evidence to support antibiotics at this time.  Continue over-the-counter medications for symptom support.  School note was provided.  Continue to monitor symptoms return urgent care or follow-up with primary care provider symptoms remain ongoing.  Follow-up in the emergency department symptoms worsen or become severe..  Prescription for Tamiflu  provided.    See AVS Instructions below for written guidance provided to patient on after-visit management and care in addition to our verbal discussion during the visit.    Please note that this dictation was created using voice recognition software. I have attempted to correct all errors, but there may be sound-alike, spelling, grammar and possibly content errors that I did not discover before finalizing the note.    Realnikole Bowers PA-C

## 2023-01-10 ENCOUNTER — HOSPITAL ENCOUNTER (OUTPATIENT)
Dept: LAB | Facility: MEDICAL CENTER | Age: 38
End: 2023-01-10
Attending: FAMILY MEDICINE
Payer: OTHER MISCELLANEOUS

## 2023-01-10 DIAGNOSIS — E03.9 ACQUIRED HYPOTHYROIDISM: ICD-10-CM

## 2023-01-10 LAB
T3FREE SERPL-MCNC: 2.19 PG/ML (ref 2–4.4)
T4 FREE SERPL-MCNC: 1.69 NG/DL (ref 0.93–1.7)
TSH SERPL DL<=0.005 MIU/L-ACNC: 0.99 UIU/ML (ref 0.38–5.33)

## 2023-01-10 PROCEDURE — 84439 ASSAY OF FREE THYROXINE: CPT

## 2023-01-10 PROCEDURE — 36415 COLL VENOUS BLD VENIPUNCTURE: CPT

## 2023-01-10 PROCEDURE — 84443 ASSAY THYROID STIM HORMONE: CPT

## 2023-01-10 PROCEDURE — 84481 FREE ASSAY (FT-3): CPT

## 2023-01-18 ENCOUNTER — OFFICE VISIT (OUTPATIENT)
Dept: MEDICAL GROUP | Facility: IMAGING CENTER | Age: 38
End: 2023-01-18
Payer: OTHER MISCELLANEOUS

## 2023-01-18 ENCOUNTER — HOSPITAL ENCOUNTER (OUTPATIENT)
Facility: MEDICAL CENTER | Age: 38
End: 2023-01-18
Payer: OTHER MISCELLANEOUS

## 2023-01-18 VITALS
TEMPERATURE: 99.1 F | WEIGHT: 158.8 LBS | BODY MASS INDEX: 22.73 KG/M2 | RESPIRATION RATE: 14 BRPM | SYSTOLIC BLOOD PRESSURE: 102 MMHG | HEIGHT: 70 IN | OXYGEN SATURATION: 100 % | HEART RATE: 70 BPM | DIASTOLIC BLOOD PRESSURE: 58 MMHG

## 2023-01-18 DIAGNOSIS — F90.9 ATTENTION DEFICIT HYPERACTIVITY DISORDER (ADHD), UNSPECIFIED ADHD TYPE: ICD-10-CM

## 2023-01-18 DIAGNOSIS — Z79.899 ON STIMULANT MEDICATION: ICD-10-CM

## 2023-01-18 PROBLEM — E06.3 AUTOIMMUNE THYROIDITIS: Status: ACTIVE | Noted: 2023-01-18

## 2023-01-18 PROBLEM — E03.9 HYPOTHYROIDISM: Status: ACTIVE | Noted: 2023-01-18

## 2023-01-18 PROCEDURE — 80307 DRUG TEST PRSMV CHEM ANLYZR: CPT

## 2023-01-18 PROCEDURE — 99212 OFFICE O/P EST SF 10 MIN: CPT

## 2023-01-18 ASSESSMENT — FIBROSIS 4 INDEX: FIB4 SCORE: 0.54

## 2023-01-18 ASSESSMENT — PATIENT HEALTH QUESTIONNAIRE - PHQ9
SUM OF ALL RESPONSES TO PHQ QUESTIONS 1-9: 3
5. POOR APPETITE OR OVEREATING: 0 - NOT AT ALL
CLINICAL INTERPRETATION OF PHQ2 SCORE: 1

## 2023-01-18 ASSESSMENT — PAIN SCALES - GENERAL: PAINLEVEL: NO PAIN

## 2023-01-18 NOTE — PROGRESS NOTES
Subjective:     CC:   Chief Complaint   Patient presents with    Medication Management     ADHD        HPI:   Juanita presents today to discuss:    ADHD  Established condition. Patient takes Adderral XR 20 mg with Adderral 10 XR mg in am and Adderral 10 XR in pm. Patient reports that she metabolizes Adderral fast which makes it difficult to control her symptoms. She was on Vyvanse which provided coverage all day; however, medication is pricey to maintain. Adderral has been effective; however, due to Adderral shortage she has been having issues with refills.  Patient was managed by pyschiatry for this; however, communication with their office during this shortage has been difficult.   She admits to taking CBD edibles, occasionally for anxiety.  She recently got a new refill on her Adderral however would like to increase dose on next refill.     Patient denies symptoms of aggression, impulsivity, irritability, hyperactivity, sleep disturbance, difficulty with concentration.      Past Medical History:   Diagnosis Date    ADHD     Allergy     Anxiety     Depression     Hashimoto's disease     Hyperthyroidism     Restless leg syndrome      Family History   Problem Relation Age of Onset    Osteoporosis Mother     No Known Problems Father      Past Surgical History:   Procedure Laterality Date    MAMMOPLASTY AUGMENTATION  2011     Social History     Tobacco Use    Smoking status: Never    Smokeless tobacco: Never   Vaping Use    Vaping Use: Never used   Substance Use Topics    Alcohol use: Yes     Comment: rarely    Drug use: Yes     Types: Marijuana, Oral     Comment:  a few times a month/occ     Social History     Social History Narrative    Not on file     Current Outpatient Medications Ordered in Epic   Medication Sig Dispense Refill    Multiple Vitamin (MULTI-VITAMIN PO) Take  by mouth.      levothyroxine (SYNTHROID) 150 MCG Tab Take 1 Tablet by mouth every morning on an empty stomach. 60 Tablet 0    Cetirizine HCl  "(ZYRTEC ALLERGY PO) Take  by mouth.      albuterol 108 (90 Base) MCG/ACT Aero Soln inhalation aerosol Inhale 2 Puffs every 6 hours as needed for Shortness of Breath. 8.5 g 1    EPINEPHrine (EPIPEN) 0.3 MG/0.3ML Solution Auto-injector solution for injection Inject 0.3 mL into the thigh one time for 1 dose. 1 Each 0    fluticasone (FLOVENT HFA) 220 MCG/ACT Aerosol Inhale 1 Puff 2 times a day. 12 g 1    montelukast (SINGULAIR) 10 MG Tab TAKE ONE TABLET BY MOUTH DAILY 90 Tablet 3    amphetamine-dextroamphetamine (ADDERALL XR) 20 MG per XR capsule       amphetamine-dextroamphetamine (ADDERALL) 10 MG Tab       Spacer/Aero-Hold Chamber Bags Misc Dispense 1 spacer for albuterol MDI. 1 Each 0    Blood Glucose Meter Kit Test blood sugar as recommended by provider when near-syncopal for hypoglycemia. One Touch Verio blood glucose monitoring kit. 1 Kit 0    Blood Glucose Test Strips Use one One Touch Verio Flex strip to test blood sugar once daily . 30 Each 1    Lancets Use one One Touch Verio Flex lancet to test blood sugar once daily . 100 Each 0    Alcohol Swabs Wipe site with prep pad prior to injection. 100 Each 0    MAGNESIUM PO Take  by mouth.      DIGESTIVE ENZYMES PO Take  by mouth.      Probiotic Product (PROBIOTIC PO) Take  by mouth.       No current Epic-ordered facility-administered medications on file.     Eggs, Shellfish allergy, Wheat bran, Ibuprofen, and Tree nuts food allergy    ROS: see hpi  Gen: no fevers/chills  Pulm: no sob, no cough  CV: no chest pain, no palpitations, no edema  GI: no nausea/vomiting, no diarrhea  Skin: no rash    Objective:   Exam:  /58 (BP Location: Left arm, Patient Position: Sitting, BP Cuff Size: Adult)   Pulse 70   Temp 37.3 °C (99.1 °F) (Temporal)   Resp 14   Ht 1.778 m (5' 10\")   Wt 72 kg (158 lb 12.8 oz)   LMP 12/25/2022 (Exact Date)   SpO2 100%   BMI 22.79 kg/m²    Body mass index is 22.79 kg/m².    Gen: Alert and oriented, No apparent distress.  HEENT: Head " atraumatic, normocephalic. Pupils equal and round.  Neck: Neck is supple without lymphadenopathy.   Lungs: Normal effort, CTA bilaterally, no wheezes, rhonchi, or rales  CV: Regular rate and rhythm. No murmurs, rubs, or gallops.  ABD: +BS. Non-tender, non-distended. No rebound, rigidity, or guarding.  Ext: No clubbing, cyanosis, edema.    Assessment & Plan:     37 y.o. female with the following -     1. Attention deficit hyperactivity disorder (ADHD), unspecified ADHD type  2. On stimulant medication  Chronic and uncontrolled condition on current dose of Adderall.  Patient was seeing a psychiatrist for this; however, communication with their office in regards to medication management has been difficult.  Patient presents today to request med management.  Patient had recent refill of her medication.  However, we will plan to increase dose at next refill.  We will plan to increase a.m. Adderall XR dose to 40 mg and continue Adderall XR 10 mg in the afternoon.   Obtained and reviewed patient utilization report from [facility] pharmacy database on 1/18/2023 6:37 PM  prior to writing prescription for controlled substance II, III or IV per [place] bill . Based on assessment of the report, the prescription is medically necessary.   Controlled substance discussed with client. Client agrees to abide by controlled substance contract.     - Controlled Substance Treatment Agreement  - URINE DRUG SCREEN; Future    Medical Decision Making/Course:  In the course of preparing for this visit with review of the pertinent past medical history, recent and past clinic visits, current medications, and performing chart, immunization, medical history and medication reconciliation, and in the further course of obtaining the current history pertinent to the clinic visit today, performing an exam and evaluation, ordering and independently evaluating labs, tests, and/or procedures, prescribing any recommended new medications as noted  above, providing any pertinent counseling and education and recommending further coordination of care. This was discussed with patient in a shared-decision making conversation, and they understand and agreed with plan of care.     Return if symptoms worsen or fail to improve.    NOE Romero.   [facility]    Please note that this dictation was created using voice recognition software. I have made every reasonable attempt to correct obvious errors, but I expect that there are errors of grammar and possibly content that I did not discover before finalizing the note.

## 2023-01-18 NOTE — LETTER
[employer] [facility]  SARINA RomeroP.R.N.  [address]   [place] [address]  Fax: [phone]   Authorization for Release/Disclosure of   Protected Health Information   Name: ROSALIND GARZA : 1985 SSN: [id number]   Address: [address] [place] [address]  [place] [address] Phone:    [phone] (home)    I authorize the entity listed below to release/disclose the PHI below to:   Piqqual/Bisi Parker A.P.R.N. and JEROME Romeor.P.R.N.   Provider or Entity Name:     Address   City, State, Zip   Phone:      Fax:     Reason for request: continuity of care   Information to be released:    [  ] LAST COLONOSCOPY,  including any PATH REPORT and follow-up  [  ] LAST FIT/COLOGUARD RESULT [  ] LAST DEXA  [  ] LAST MAMMOGRAM  [  ] LAST PAP  [  ] LAST LABS [  ] RETINA EXAM REPORT  [  ] IMMUNIZATION RECORDS  [  ] Release all info      [  ] Check here and initial the line next to each item to release ALL health information INCLUDING  _____ Care and treatment for drug and / or alcohol abuse  _____ HIV testing, infection status, or AIDS  _____ Genetic Testing    DATES OF SERVICE OR TIME PERIOD TO BE DISCLOSED: _____________  I understand and acknowledge that:  * This Authorization may be revoked at any time by you in writing, except if your health information has already been used or disclosed.  * Your health information that will be used or disclosed as a result of you signing this authorization could be re-disclosed by the recipient. If this occurs, your re-disclosed health information may no longer be protected by State or Federal laws.  * You may refuse to sign this Authorization. Your refusal will not affect your ability to obtain treatment.  * This Authorization becomes effective upon signing and will  on (date) __________.      If no date is indicated, this Authorization will  one (1) year from the signature date.    Name: Rosalind Garza    Signature:   Date:     2023        PLEASE FAX REQUESTED RECORDS BACK TO: [phone]

## 2023-01-20 LAB
AMPHETAMINES UR QL: POSITIVE NG/ML
BARBITURATES UR QL: NEGATIVE NG/ML
BENZODIAZ UR QL: NEGATIVE NG/ML
CANNABINOIDS UR QL SCN: NEGATIVE NG/ML
COCAINE UR QL: NEGATIVE NG/ML
DRUG SCREEN COMMENT UR-IMP: NORMAL
MDMA CTO UR SCN-MCNC: NEGATIVE NG/ML
METHADONE UR QL: NEGATIVE NG/ML
OPIATES UR QL: NEGATIVE NG/ML
OXYCODONE CTO UR SCN-MCNC: NEGATIVE NG/ML
PCP UR QL SCN: NEGATIVE NG/ML
PROPOXYPH UR QL: NEGATIVE NG/ML

## 2023-02-12 DIAGNOSIS — E03.9 ACQUIRED HYPOTHYROIDISM: ICD-10-CM

## 2023-02-13 RX ORDER — LEVOTHYROXINE SODIUM 150 MCG
TABLET ORAL
Qty: 60 TABLET | Refills: 0 | Status: SHIPPED | OUTPATIENT
Start: 2023-02-13

## 2023-02-14 RX ORDER — MONTELUKAST SODIUM 10 MG/1
10 TABLET ORAL DAILY
Qty: 90 TABLET | Refills: 3 | Status: SHIPPED | OUTPATIENT
Start: 2023-02-14 | End: 2023-09-13 | Stop reason: SDUPTHER

## 2023-02-14 NOTE — TELEPHONE ENCOUNTER
Received request via: Pharmacy    Was the patient seen in the last year in this department? Yes    Does the patient have an active prescription (recently filled or refills available) for medication(s) requested? No    Does the patient have correction Plus and need 100 day supply (blood pressure, diabetes and cholesterol meds only)? Patient does not have SCP    Initially prescribed by erika

## 2023-02-27 ENCOUNTER — HOSPITAL ENCOUNTER (OUTPATIENT)
Facility: MEDICAL CENTER | Age: 38
End: 2023-02-27
Attending: NURSE PRACTITIONER
Payer: COMMERCIAL

## 2023-02-27 ENCOUNTER — OFFICE VISIT (OUTPATIENT)
Dept: URGENT CARE | Facility: CLINIC | Age: 38
End: 2023-02-27
Payer: COMMERCIAL

## 2023-02-27 VITALS
HEART RATE: 76 BPM | WEIGHT: 155 LBS | RESPIRATION RATE: 20 BRPM | DIASTOLIC BLOOD PRESSURE: 80 MMHG | OXYGEN SATURATION: 98 % | SYSTOLIC BLOOD PRESSURE: 118 MMHG | HEIGHT: 70 IN | TEMPERATURE: 98.3 F | BODY MASS INDEX: 22.19 KG/M2

## 2023-02-27 DIAGNOSIS — J02.9 PHARYNGITIS, UNSPECIFIED ETIOLOGY: ICD-10-CM

## 2023-02-27 DIAGNOSIS — J06.9 VIRAL URI: ICD-10-CM

## 2023-02-27 DIAGNOSIS — R21 RASH: ICD-10-CM

## 2023-02-27 LAB — S PYO DNA SPEC NAA+PROBE: NOT DETECTED

## 2023-02-27 PROCEDURE — 87651 STREP A DNA AMP PROBE: CPT | Performed by: NURSE PRACTITIONER

## 2023-02-27 PROCEDURE — U0003 INFECTIOUS AGENT DETECTION BY NUCLEIC ACID (DNA OR RNA); SEVERE ACUTE RESPIRATORY SYNDROME CORONAVIRUS 2 (SARS-COV-2) (CORONAVIRUS DISEASE [COVID-19]), AMPLIFIED PROBE TECHNIQUE, MAKING USE OF HIGH THROUGHPUT TECHNOLOGIES AS DESCRIBED BY CMS-2020-01-R: HCPCS

## 2023-02-27 PROCEDURE — 99213 OFFICE O/P EST LOW 20 MIN: CPT | Performed by: NURSE PRACTITIONER

## 2023-02-27 PROCEDURE — U0005 INFEC AGEN DETEC AMPLI PROBE: HCPCS

## 2023-02-27 RX ORDER — LEVOTHYROXINE SODIUM 0.15 MG/1
TABLET ORAL
COMMUNITY
End: 2023-02-27

## 2023-02-27 RX ORDER — FLUCONAZOLE 150 MG/1
TABLET ORAL
COMMUNITY
Start: 2023-01-02

## 2023-02-27 ASSESSMENT — ENCOUNTER SYMPTOMS
MYALGIAS: 1
CONSTITUTIONAL NEGATIVE: 1
SORE THROAT: 1
RESPIRATORY NEGATIVE: 1
HEADACHES: 1

## 2023-02-27 ASSESSMENT — VISUAL ACUITY: OU: 1

## 2023-02-27 ASSESSMENT — FIBROSIS 4 INDEX: FIB4 SCORE: 0.54

## 2023-02-27 NOTE — LETTER
February 27, 2023         Patient: Juanita Montero   YOB: 1985   Date of Visit: 2/27/2023           To Whom it May Concern:    Juanita Montero was seen in my clinic on 2/27/2023 due to illness.    COVID-19 PCR test pending. Patient is advised to self-isolate as recommended by the CDC as applicable.    Children and adults with mild, symptomatic COVID-19: Isolation can end at least 5 days after symptom onset and after fever ends for 24 hours (without the use of fever-reducing medication) and symptoms are improving, if these people can continue to properly wear a well-fitted mask around others for 5 more days after the 5-day isolation period. Day 0 is the first day of symptoms.    Please visit https://www.cdc.gov/coronavirus/2019-ncov/hcp/duration-isolation.html for further information.     If you have any questions or concerns, please don't hesitate to call.        Sincerely,           NOE Delgado.  Electronically Signed

## 2023-02-27 NOTE — PROGRESS NOTES
Subjective:     Juanita Montero is a 37 y.o. female who presents for Sore Throat (Achy, headache, bilateral ear ache, x 1 day, Covid exposure ) and Rash (Lt side of cheek x 2 months, itchy, dry and scaly )       Rash  This is a new problem. The problem has been gradually worsening since onset. Associated symptoms include a sore throat.   Pharyngitis   This is a new problem. The problem has been gradually worsening. Associated symptoms include headaches.     2 days ago, patient started to develop a headache.  Yesterday, developed sore throat and body aches.  Did home COVID test which came back negative.    Exposed to COVID and strep.  Works in healthcare.    On a separate note, patient started to develop a rash at the left side of her face 2 months ago.  Mildly inflamed, erythematous, and itchy.    Review of Systems   Constitutional: Negative.    HENT:  Positive for sore throat.    Respiratory: Negative.     Musculoskeletal:  Positive for myalgias.   Skin:  Positive for itching and rash.   Neurological:  Positive for headaches.   All other systems reviewed and are negative.    Refer to HPI for additional details.    During this visit, appropriate PPE was worn, hand hygiene was performed, and the patient and any visitors were masked.    PMH:  has a past medical history of ADHD, Allergy, Anxiety, Depression, Hashimoto's disease, Hyperthyroidism, and Restless leg syndrome.    MEDS:   Current Outpatient Medications:     Homeopathic Products (ZICAM ALLERGY RELIEF NA), Administer  into affected nostril(S)., Disp: , Rfl:     montelukast (SINGULAIR) 10 MG Tab, Take 1 Tablet by mouth every day., Disp: 90 Tablet, Rfl: 3    SYNTHROID 150 MCG Tab, TAKE ONE TABLET BY MOUTH EVERY MORNING ON AN EMPTY STOMACH, Disp: 60 Tablet, Rfl: 0    amphetamine-dextroamphetamine (ADDERALL XR) 20 MG per XR capsule, Take 2 Capsules by mouth every morning for 30 days., Disp: 60 Capsule, Rfl: 0    Multiple Vitamin (MULTI-VITAMIN PO), Take   by mouth., Disp: , Rfl:     Cetirizine HCl (ZYRTEC ALLERGY PO), Take  by mouth., Disp: , Rfl:     albuterol 108 (90 Base) MCG/ACT Aero Soln inhalation aerosol, Inhale 2 Puffs every 6 hours as needed for Shortness of Breath., Disp: 8.5 g, Rfl: 1    EPINEPHrine (EPIPEN) 0.3 MG/0.3ML Solution Auto-injector solution for injection, Inject 0.3 mL into the thigh one time for 1 dose., Disp: 1 Each, Rfl: 0    fluticasone (FLOVENT HFA) 220 MCG/ACT Aerosol, Inhale 1 Puff 2 times a day., Disp: 12 g, Rfl: 1    Spacer/Aero-Hold Chamber Bags Misc, Dispense 1 spacer for albuterol MDI., Disp: 1 Each, Rfl: 0    Blood Glucose Meter Kit, Test blood sugar as recommended by provider when near-syncopal for hypoglycemia. One Touch Verio blood glucose monitoring kit., Disp: 1 Kit, Rfl: 0    Blood Glucose Test Strips, Use one One Touch Verio Flex strip to test blood sugar once daily ., Disp: 30 Each, Rfl: 1    Alcohol Swabs, Wipe site with prep pad prior to injection., Disp: 100 Each, Rfl: 0    MAGNESIUM PO, Take  by mouth., Disp: , Rfl:     fluconazole (DIFLUCAN) 150 MG tablet, TAKE 1 TABLET BY MOUTH ONCE. MAY TAKE 2ND PILL 2 DAYS LATER IF SYMPTOMS PERSIST, Disp: , Rfl:     amphetamine-dextroamphetamine (ADDERALL) 10 MG Tab, Take 1 Tablet by mouth every evening for 30 days., Disp: 30 Tablet, Rfl: 0    Lancets, Use one One Touch Verio Flex lancet to test blood sugar once daily ., Disp: 100 Each, Rfl: 0    DIGESTIVE ENZYMES PO, Take  by mouth., Disp: , Rfl:     Probiotic Product (PROBIOTIC PO), Take  by mouth., Disp: , Rfl:     ALLERGIES:   Allergies   Allergen Reactions    Eggs Anaphylaxis    Shellfish Allergy Anaphylaxis    Wheat Bran Anaphylaxis    Ibuprofen Anaphylaxis    Tree Nuts Food Allergy Shortness of Breath     SURGHX:   Past Surgical History:   Procedure Laterality Date    MAMMOPLASTY AUGMENTATION  2011     SOCHX:  reports that she has never smoked. She has never used smokeless tobacco. She reports that she does not currently use  "alcohol. She reports that she does not currently use drugs after having used the following drugs: Marijuana and Oral.    FH: Per HPI as applicable/pertinent.      Objective:     /80 (BP Location: Left arm, Patient Position: Sitting, BP Cuff Size: Adult)   Pulse 76   Temp 36.8 °C (98.3 °F) (Temporal)   Resp 20   Ht 1.778 m (5' 10\")   Wt 70.3 kg (155 lb)   SpO2 98%   BMI 22.24 kg/m²     Physical Exam  Nursing note reviewed.   Constitutional:       General: She is not in acute distress.     Appearance: She is well-developed. She is not ill-appearing or toxic-appearing.   HENT:      Mouth/Throat:      Mouth: Mucous membranes are moist.      Pharynx: Uvula midline. Pharyngeal swelling and posterior oropharyngeal erythema present.   Eyes:      General: Vision grossly intact.   Cardiovascular:      Rate and Rhythm: Normal rate.   Pulmonary:      Effort: Pulmonary effort is normal. No respiratory distress.      Comments: Phonation normal, speaks in full sentences, no audible wheeze or stridor   Musculoskeletal:         General: No deformity. Normal range of motion.   Lymphadenopathy:      Cervical: Cervical adenopathy present.   Skin:     General: Skin is warm and dry.      Coloration: Skin is not pale.      Findings: Rash present.          Neurological:      Mental Status: She is alert and oriented to person, place, and time.      Motor: No weakness.   Psychiatric:         Behavior: Behavior normal. Behavior is cooperative.     POCT Cepheid Group A Strep by PCR: negative      Assessment/Plan:     1. Pharyngitis, unspecified etiology  - POCT GROUP A STREP, PCR  - COVID/SARS CoV-2 PCR; Future    2. Viral URI    3. Rash    Discussed likely self-limiting viral etiology for sore throat and body aches and expected course and duration of illness. Vital signs stable, afebrile, no acute distress at this time. Emphasize supportive measures, close monitoring, and OTC medication PRN.    PCR Covid pending. Isolate/mask per " CDC guidelines as applicable. Work note provided.     Likely facial dermatitis. Change/avoid facial products. Keep moisturized. May use OTC Cortizone 10 cream for 1 week. If not better, should follow up with dermatology.    Differential diagnosis, natural history, supportive care, over-the-counter symptom management per 's instructions, close monitoring, and indications for immediate follow-up discussed.    All questions answered. Patient agrees with the plan of care.

## 2023-02-28 DIAGNOSIS — J02.9 PHARYNGITIS, UNSPECIFIED ETIOLOGY: ICD-10-CM

## 2023-03-01 LAB
SARS-COV-2 RNA RESP QL NAA+PROBE: NOTDETECTED
SPECIMEN SOURCE: NORMAL

## 2023-03-14 ENCOUNTER — HOSPITAL ENCOUNTER (OUTPATIENT)
Facility: MEDICAL CENTER | Age: 38
End: 2023-03-14
Payer: COMMERCIAL

## 2023-03-14 ENCOUNTER — OFFICE VISIT (OUTPATIENT)
Dept: MEDICAL GROUP | Facility: IMAGING CENTER | Age: 38
End: 2023-03-14
Payer: COMMERCIAL

## 2023-03-14 VITALS
RESPIRATION RATE: 16 BRPM | HEART RATE: 65 BPM | WEIGHT: 161 LBS | BODY MASS INDEX: 23.05 KG/M2 | SYSTOLIC BLOOD PRESSURE: 116 MMHG | TEMPERATURE: 98.6 F | DIASTOLIC BLOOD PRESSURE: 88 MMHG | HEIGHT: 70 IN | OXYGEN SATURATION: 100 %

## 2023-03-14 DIAGNOSIS — F90.9 ATTENTION DEFICIT HYPERACTIVITY DISORDER (ADHD), UNSPECIFIED ADHD TYPE: ICD-10-CM

## 2023-03-14 DIAGNOSIS — N89.8 VAGINAL DISCHARGE: ICD-10-CM

## 2023-03-14 DIAGNOSIS — B37.31 RECURRENT CANDIDIASIS OF VAGINA: ICD-10-CM

## 2023-03-14 DIAGNOSIS — Z01.419 GYNECOLOGIC EXAM NORMAL: ICD-10-CM

## 2023-03-14 LAB
APPEARANCE UR: NORMAL
BILIRUB UR STRIP-MCNC: NORMAL MG/DL
COLOR UR AUTO: NORMAL
GLUCOSE UR STRIP.AUTO-MCNC: NORMAL MG/DL
KETONES UR STRIP.AUTO-MCNC: NORMAL MG/DL
LEUKOCYTE ESTERASE UR QL STRIP.AUTO: NORMAL
NITRITE UR QL STRIP.AUTO: NORMAL
PH UR STRIP.AUTO: 7 [PH] (ref 5–8)
PROT UR QL STRIP: NORMAL MG/DL
RBC UR QL AUTO: NORMAL
SP GR UR STRIP.AUTO: 1.05
UROBILINOGEN UR STRIP-MCNC: 0.2 MG/DL

## 2023-03-14 PROCEDURE — 99214 OFFICE O/P EST MOD 30 MIN: CPT

## 2023-03-14 PROCEDURE — 87480 CANDIDA DNA DIR PROBE: CPT

## 2023-03-14 PROCEDURE — 87510 GARDNER VAG DNA DIR PROBE: CPT

## 2023-03-14 PROCEDURE — 87660 TRICHOMONAS VAGIN DIR PROBE: CPT

## 2023-03-14 PROCEDURE — 81002 URINALYSIS NONAUTO W/O SCOPE: CPT

## 2023-03-14 RX ORDER — DEXTROAMPHETAMINE SACCHARATE, AMPHETAMINE ASPARTATE MONOHYDRATE, DEXTROAMPHETAMINE SULFATE AND AMPHETAMINE SULFATE 7.5; 7.5; 7.5; 7.5 MG/1; MG/1; MG/1; MG/1
30 CAPSULE, EXTENDED RELEASE ORAL EVERY MORNING
Qty: 30 CAPSULE | Refills: 0 | Status: CANCELLED | OUTPATIENT
Start: 2023-03-14 | End: 2023-04-13

## 2023-03-14 RX ORDER — FLUCONAZOLE 150 MG/1
150 TABLET ORAL DAILY
Qty: 3 TABLET | Refills: 3 | Status: SHIPPED | OUTPATIENT
Start: 2023-03-14

## 2023-03-14 RX ORDER — DEXTROAMPHETAMINE SACCHARATE, AMPHETAMINE ASPARTATE MONOHYDRATE, DEXTROAMPHETAMINE SULFATE AND AMPHETAMINE SULFATE 5; 5; 5; 5 MG/1; MG/1; MG/1; MG/1
40 CAPSULE, EXTENDED RELEASE ORAL EVERY MORNING
Qty: 60 CAPSULE | Refills: 0 | Status: SHIPPED
Start: 2023-03-14 | End: 2023-03-15

## 2023-03-14 RX ORDER — DEXTROAMPHETAMINE SACCHARATE, AMPHETAMINE ASPARTATE MONOHYDRATE, DEXTROAMPHETAMINE SULFATE AND AMPHETAMINE SULFATE 2.5; 2.5; 2.5; 2.5 MG/1; MG/1; MG/1; MG/1
10 CAPSULE, EXTENDED RELEASE ORAL DAILY
Qty: 30 CAPSULE | Refills: 0 | Status: CANCELLED | OUTPATIENT
Start: 2023-03-14 | End: 2023-04-13

## 2023-03-14 ASSESSMENT — FIBROSIS 4 INDEX: FIB4 SCORE: 0.54

## 2023-03-14 NOTE — PROGRESS NOTES
Subjective:     CC:   Chief Complaint   Patient presents with    UTI     Pt reported white discharge, pain and discomforted       HPI:   Juanita presents today to discuss:    Patient reports developing vaginal pruritus, dyspareunia, vaginal bleeding, and white vaginal discharge last week.  .  She completed 6 Diflucan on Wednesday with symptom improvement.  However, her symptoms reappeared today.  She is keeping herself well-hydrated.  She is taking probiotics.  She eats healthy and strict anti-inflammatory diet.   Patient with personal history of recurrent Candida vaginitis  Patient does have past history of HPV which could be contributing to her recurrent symptoms.  She is seeing OB/GYN.  However, she would like referral to another OB/GYN for second opinion.    Patient reports developing facial rash.  She was treating this with hydrocortisone which is helpful.  However, she is concerned for Candida overgrowth which could be contributing to her symptoms.    Patient's usual ADHD regimen is:  Adderall XR 30 mg in a.m. and 10 mg IR in afternoon which is helpful at managing her symptoms.  However, her pharmacy is out of stock on Adderall 10 mg IR.  Patient is currently taking Adderall XR 40 mg tablets in a.m. however, this regimen is not ideal.  It does not provide her with coverage throughout the day.  However, if she tries to take the 20 mg tablets at different times.  She has difficulty going to sleep.  Patient was previously taking Vyvanse which was effective at controlling her symptoms throughout the day.  However, due to insurance coverage, it is too expensive to continue.  She has tried Ritalin in the past which was ineffective.  She would like to eventually go back to Vyvanse since she recently changed insurance.    Patient is scheduled to follow-up with endocrinology on 4/17.    Past Medical History:   Diagnosis Date    ADHD     Allergy     Anxiety     Depression     Hashimoto's disease     Hyperthyroidism      Restless leg syndrome      Family History   Problem Relation Age of Onset    Osteoporosis Mother     No Known Problems Father      Past Surgical History:   Procedure Laterality Date    MAMMOPLASTY AUGMENTATION  2011     Social History     Tobacco Use    Smoking status: Never    Smokeless tobacco: Never   Vaping Use    Vaping Use: Never used   Substance Use Topics    Alcohol use: Not Currently     Comment: rarely    Drug use: Not Currently     Types: Marijuana, Oral     Comment:  a few times a month/occ     Social History     Social History Narrative    Not on file     Current Outpatient Medications Ordered in Epic   Medication Sig Dispense Refill    Homeopathic Products (ZICAM ALLERGY RELIEF NA) Administer  into affected nostril(S).      montelukast (SINGULAIR) 10 MG Tab Take 1 Tablet by mouth every day. 90 Tablet 3    SYNTHROID 150 MCG Tab TAKE ONE TABLET BY MOUTH EVERY MORNING ON AN EMPTY STOMACH 60 Tablet 0    Multiple Vitamin (MULTI-VITAMIN PO) Take  by mouth.      Cetirizine HCl (ZYRTEC ALLERGY PO) Take  by mouth.      albuterol 108 (90 Base) MCG/ACT Aero Soln inhalation aerosol Inhale 2 Puffs every 6 hours as needed for Shortness of Breath. 8.5 g 1    EPINEPHrine (EPIPEN) 0.3 MG/0.3ML Solution Auto-injector solution for injection Inject 0.3 mL into the thigh one time for 1 dose. 1 Each 0    Blood Glucose Meter Kit Test blood sugar as recommended by provider when near-syncopal for hypoglycemia. One Touch Verio blood glucose monitoring kit. 1 Kit 0    Blood Glucose Test Strips Use one One Touch Verio Flex strip to test blood sugar once daily . 30 Each 1    Lancets Use one One Touch Verio Flex lancet to test blood sugar once daily . 100 Each 0    Alcohol Swabs Wipe site with prep pad prior to injection. 100 Each 0    MAGNESIUM PO Take  by mouth.      Probiotic Product (PROBIOTIC PO) Take  by mouth.      fluconazole (DIFLUCAN) 150 MG tablet TAKE 1 TABLET BY MOUTH ONCE. MAY TAKE 2ND PILL 2 DAYS LATER IF SYMPTOMS  "PERSIST (Patient not taking: Reported on 3/14/2023)      fluticasone (FLOVENT HFA) 220 MCG/ACT Aerosol Inhale 1 Puff 2 times a day. (Patient not taking: Reported on 3/14/2023) 12 g 1    Spacer/Aero-Hold Chamber Bags Misc Dispense 1 spacer for albuterol MDI. (Patient not taking: Reported on 3/14/2023) 1 Each 0    DIGESTIVE ENZYMES PO Take  by mouth.       No current Epic-ordered facility-administered medications on file.     Eggs, Shellfish allergy, Wheat bran, Ibuprofen, and Tree nuts food allergy    ROS: see hpi  Gen: no fevers/chills  Pulm: no sob, no cough  CV: no chest pain, no palpitations, no edema  GI: no nausea/vomiting, no diarrhea  Skin: no rash    Objective:   Exam:  /88 (BP Location: Left arm, Patient Position: Sitting, BP Cuff Size: Adult)   Pulse 65   Temp 37 °C (98.6 °F) (Temporal)   Resp 16   Ht 1.778 m (5' 10\") Comment: pt reported  Wt 73 kg (161 lb)   LMP 02/16/2023   SpO2 100%   BMI 23.10 kg/m²    Body mass index is 23.1 kg/m².    Gen: Alert and oriented, No apparent distress.  HEENT: Head atraumatic, normocephalic. Pupils equal and round.  Neck: Neck is supple without lymphadenopathy.   Lungs: Normal effort, CTA bilaterally, no wheezes, rhonchi, or rales  CV: Regular rate and rhythm. No murmurs, rubs, or gallops.  ABD: +BS. Non-tender, non-distended. No rebound, rigidity, or guarding.  Ext: No clubbing, cyanosis, edema.    Assessment & Plan:     37 y.o. female with the following -     1. Attention deficit hyperactivity disorder (ADHD), unspecified ADHD type  Chronic and tolerable condition on Adderall. Med refill sent to pharmacy.   She is currently taking Adderall XR 40 mg in am.. She is having break through brain fog by mid afternoon.   Her usual regimen is Adderall XR 30 mg in a.m. and 10 mg IR in afternoon which is helpful at managing her symptoms.  However, her pharmacy is out of stock on Adderall 10 mg IR.  Patient's symptoms were better managed with Vyvanse. Patient recently " changed insurance. Will attempt to restart patient on this.   Patient had failed treatment with Ritalin.     - amphetamine-dextroamphetamine (ADDERALL XR) 20 MG per XR capsule; Take 2 Capsules by mouth every morning for 30 days.  Dispense: 60 Capsule; Refill: 0    2. Recurrent candidiasis of vagina  3. Vaginal discharge  Onset of symptoms started last week s/p Diflucan treatment on Wednesday with symptom relief. However, her symptoms reappeared. U/A negative for UTI. Vaginal pathogen sent to lab. Patient presentation is concerning for candida overgrowth. Recommend low sugar and anti-inflammatory diet, high in fiber from vegetables. Continue probiotic supplements.   Patient will be going out of town in a few days. Will send prescription for Diflucan if her symptoms worsen.     - fluconazole (DIFLUCAN) 150 MG tablet; Take 1 Tablet by mouth every day.  Dispense: 3 Tablet; Refill: 3  - POCT Urinalysis-negative  - VAGINAL PATHOGENS DNA PANEL; Future    4. Gynecologic exam normal    - Referral to OB/Gyn    Medical Decision Making/Course:  In the course of preparing for this visit with review of the pertinent past medical history, recent and past clinic visits, current medications, and performing chart, immunization, medical history and medication reconciliation, and in the further course of obtaining the current history pertinent to the clinic visit today, performing an exam and evaluation, ordering and independently evaluating labs, tests, and/or procedures, prescribing any recommended new medications as noted above, providing any pertinent counseling and education and recommending further coordination of care. This was discussed with patient in a shared-decision making conversation, and they understand and agreed with plan of care.     No follow-ups on file.    NOE Romero.   [facility]    Please note that this dictation was created using voice recognition software. I have made every  reasonable attempt to correct obvious errors, but I expect that there are errors of grammar and possibly content that I did not discover before finalizing the note.

## 2023-03-15 DIAGNOSIS — N89.8 VAGINAL DISCHARGE: ICD-10-CM

## 2023-03-15 DIAGNOSIS — F90.9 ATTENTION DEFICIT HYPERACTIVITY DISORDER (ADHD), UNSPECIFIED ADHD TYPE: ICD-10-CM

## 2023-03-15 RX ORDER — DEXTROAMPHETAMINE SACCHARATE, AMPHETAMINE ASPARTATE MONOHYDRATE, DEXTROAMPHETAMINE SULFATE AND AMPHETAMINE SULFATE 5; 5; 5; 5 MG/1; MG/1; MG/1; MG/1
2 CAPSULE, EXTENDED RELEASE ORAL DAILY
COMMUNITY
End: 2023-04-10 | Stop reason: SDUPTHER

## 2023-04-10 ENCOUNTER — PATIENT MESSAGE (OUTPATIENT)
Dept: MEDICAL GROUP | Facility: IMAGING CENTER | Age: 38
End: 2023-04-10
Payer: COMMERCIAL

## 2023-04-10 DIAGNOSIS — F90.9 ATTENTION DEFICIT HYPERACTIVITY DISORDER (ADHD), UNSPECIFIED ADHD TYPE: ICD-10-CM

## 2023-04-10 NOTE — PATIENT COMMUNICATION
Received request via: Patient    Was the patient seen in the last year in this department? Yes    Does the patient have an active prescription (recently filled or refills available) for medication(s) requested? No    Does the patient have long term Plus and need 100 day supply (blood pressure, diabetes and cholesterol meds only)? Patient does not have SCP

## 2023-04-11 RX ORDER — DEXTROAMPHETAMINE SACCHARATE, AMPHETAMINE ASPARTATE MONOHYDRATE, DEXTROAMPHETAMINE SULFATE AND AMPHETAMINE SULFATE 5; 5; 5; 5 MG/1; MG/1; MG/1; MG/1
40 CAPSULE, EXTENDED RELEASE ORAL DAILY
Qty: 60 CAPSULE | Refills: 0 | Status: SHIPPED | OUTPATIENT
Start: 2023-04-11 | End: 2023-05-12 | Stop reason: SDUPTHER

## 2023-05-12 ENCOUNTER — PATIENT MESSAGE (OUTPATIENT)
Dept: MEDICAL GROUP | Facility: IMAGING CENTER | Age: 38
End: 2023-05-12
Payer: COMMERCIAL

## 2023-05-12 DIAGNOSIS — F90.9 ATTENTION DEFICIT HYPERACTIVITY DISORDER (ADHD), UNSPECIFIED ADHD TYPE: ICD-10-CM

## 2023-05-12 PROCEDURE — 1126F AMNT PAIN NOTED NONE PRSNT: CPT

## 2023-05-13 RX ORDER — DEXTROAMPHETAMINE SACCHARATE, AMPHETAMINE ASPARTATE MONOHYDRATE, DEXTROAMPHETAMINE SULFATE AND AMPHETAMINE SULFATE 5; 5; 5; 5 MG/1; MG/1; MG/1; MG/1
40 CAPSULE, EXTENDED RELEASE ORAL DAILY
Qty: 60 CAPSULE | Refills: 0 | Status: SHIPPED | OUTPATIENT
Start: 2023-05-13 | End: 2023-06-14 | Stop reason: SDUPTHER

## 2023-06-13 ENCOUNTER — PATIENT MESSAGE (OUTPATIENT)
Dept: MEDICAL GROUP | Facility: IMAGING CENTER | Age: 38
End: 2023-06-13
Payer: COMMERCIAL

## 2023-06-13 DIAGNOSIS — F90.9 ATTENTION DEFICIT HYPERACTIVITY DISORDER (ADHD), UNSPECIFIED ADHD TYPE: ICD-10-CM

## 2023-06-14 RX ORDER — DEXTROAMPHETAMINE SACCHARATE, AMPHETAMINE ASPARTATE MONOHYDRATE, DEXTROAMPHETAMINE SULFATE AND AMPHETAMINE SULFATE 5; 5; 5; 5 MG/1; MG/1; MG/1; MG/1
40 CAPSULE, EXTENDED RELEASE ORAL DAILY
Qty: 60 CAPSULE | Refills: 0 | Status: SHIPPED | OUTPATIENT
Start: 2023-06-14 | End: 2023-06-15 | Stop reason: SDUPTHER

## 2023-06-14 NOTE — PATIENT COMMUNICATION
Received request via: Patient    Was the patient seen in the last year in this department? Yes    Does the patient have an active prescription (recently filled or refills available) for medication(s) requested? No    Does the patient have USP Plus and need 100 day supply (blood pressure, diabetes and cholesterol meds only)? Patient does not have SCP

## 2023-06-15 ENCOUNTER — OFFICE VISIT (OUTPATIENT)
Dept: MEDICAL GROUP | Facility: IMAGING CENTER | Age: 38
End: 2023-06-15
Payer: COMMERCIAL

## 2023-06-15 VITALS
DIASTOLIC BLOOD PRESSURE: 72 MMHG | HEART RATE: 74 BPM | BODY MASS INDEX: 22.19 KG/M2 | RESPIRATION RATE: 16 BRPM | SYSTOLIC BLOOD PRESSURE: 104 MMHG | TEMPERATURE: 97.8 F | WEIGHT: 155 LBS | OXYGEN SATURATION: 97 % | HEIGHT: 70 IN

## 2023-06-15 DIAGNOSIS — F90.9 ATTENTION DEFICIT HYPERACTIVITY DISORDER (ADHD), UNSPECIFIED ADHD TYPE: ICD-10-CM

## 2023-06-15 PROCEDURE — 99213 OFFICE O/P EST LOW 20 MIN: CPT

## 2023-06-15 PROCEDURE — 3074F SYST BP LT 130 MM HG: CPT

## 2023-06-15 PROCEDURE — 3078F DIAST BP <80 MM HG: CPT

## 2023-06-15 RX ORDER — DEXTROAMPHETAMINE SACCHARATE, AMPHETAMINE ASPARTATE MONOHYDRATE, DEXTROAMPHETAMINE SULFATE AND AMPHETAMINE SULFATE 5; 5; 5; 5 MG/1; MG/1; MG/1; MG/1
40 CAPSULE, EXTENDED RELEASE ORAL DAILY
Qty: 60 CAPSULE | Refills: 0 | Status: SHIPPED | OUTPATIENT
Start: 2023-06-15 | End: 2023-07-13 | Stop reason: SDUPTHER

## 2023-06-15 ASSESSMENT — FIBROSIS 4 INDEX: FIB4 SCORE: 0.55

## 2023-06-15 NOTE — PROGRESS NOTES
Subjective:     CC:   Chief Complaint   Patient presents with    Follow-Up     Adderall        HPI:   Juanita presents today to discuss:    ADHD  Chronic condition. Patient is taking Adderrall XR 20 mg, 2 tabs daily . Tolerating medication well without side effects.  Patient denies symptoms of aggression, impulsivity, irritability, hyperactivity, difficulty with concentration.      Past Medical History:   Diagnosis Date    ADHD     Allergy     Anxiety     Depression     Hashimoto's disease     Hyperthyroidism     Restless leg syndrome      Family History   Problem Relation Age of Onset    Osteoporosis Mother     No Known Problems Father      Past Surgical History:   Procedure Laterality Date    MAMMOPLASTY AUGMENTATION  2011     Social History     Tobacco Use    Smoking status: Never    Smokeless tobacco: Never   Vaping Use    Vaping Use: Never used   Substance Use Topics    Alcohol use: Not Currently     Comment: rarely    Drug use: Not Currently     Types: Marijuana, Oral     Comment:  a few times a month/occ     Social History     Social History Narrative    Not on file     Current Outpatient Medications Ordered in Epic   Medication Sig Dispense Refill    amphetamine-dextroamphetamine (ADDERALL XR) 20 MG per XR capsule Take 2 Capsules by mouth every day for 30 days. 60 Capsule 0    montelukast (SINGULAIR) 10 MG Tab Take 1 Tablet by mouth every day. 90 Tablet 3    SYNTHROID 150 MCG Tab TAKE ONE TABLET BY MOUTH EVERY MORNING ON AN EMPTY STOMACH 60 Tablet 0    Multiple Vitamin (MULTI-VITAMIN PO) Take  by mouth.      Cetirizine HCl (ZYRTEC ALLERGY PO) Take  by mouth.      albuterol 108 (90 Base) MCG/ACT Aero Soln inhalation aerosol Inhale 2 Puffs every 6 hours as needed for Shortness of Breath. 8.5 g 1    EPINEPHrine (EPIPEN) 0.3 MG/0.3ML Solution Auto-injector solution for injection Inject 0.3 mL into the thigh one time for 1 dose. 1 Each 0    MAGNESIUM PO Take  by mouth.      DIGESTIVE ENZYMES PO Take  by mouth.       "Probiotic Product (PROBIOTIC PO) Take  by mouth.      fluconazole (DIFLUCAN) 150 MG tablet Take 1 Tablet by mouth every day. (Patient not taking: Reported on 6/15/2023) 3 Tablet 3    fluconazole (DIFLUCAN) 150 MG tablet TAKE 1 TABLET BY MOUTH ONCE. MAY TAKE 2ND PILL 2 DAYS LATER IF SYMPTOMS PERSIST (Patient not taking: Reported on 3/14/2023)      Homeopathic Products (ZICAM ALLERGY RELIEF NA) Administer  into affected nostril(S). (Patient not taking: Reported on 6/15/2023)      fluticasone (FLOVENT HFA) 220 MCG/ACT Aerosol Inhale 1 Puff 2 times a day. (Patient not taking: Reported on 3/14/2023) 12 g 1    Spacer/Aero-Hold Chamber Bags Misc Dispense 1 spacer for albuterol MDI. (Patient not taking: Reported on 3/14/2023) 1 Each 0    Blood Glucose Meter Kit Test blood sugar as recommended by provider when near-syncopal for hypoglycemia. One Touch Verio blood glucose monitoring kit. (Patient not taking: Reported on 6/15/2023) 1 Kit 0    Blood Glucose Test Strips Use one One Touch Verio Flex strip to test blood sugar once daily . (Patient not taking: Reported on 6/15/2023) 30 Each 1    Lancets Use one One Touch Verio Flex lancet to test blood sugar once daily . (Patient not taking: Reported on 6/15/2023) 100 Each 0    Alcohol Swabs Wipe site with prep pad prior to injection. (Patient not taking: Reported on 6/15/2023) 100 Each 0     No current [facility]-ordered facility-administered medications on file.     Eggs, Shellfish allergy, Wheat bran, Ibuprofen, and Tree nuts food allergy    ROS: see hpi  Gen: no fevers/chills  Pulm: no sob, no cough  CV: no chest pain, no palpitations, no edema  GI: no nausea/vomiting, no diarrhea  Skin: no rash    Objective:   Exam:  /72 (BP Location: Left arm, Patient Position: Sitting, BP Cuff Size: Adult)   Pulse 74   Temp 36.6 °C (97.8 °F) (Temporal)   Resp 16   Ht 1.778 m (5' 10\")   Wt 70.3 kg (155 lb)   LMP 05/17/2023   SpO2 97%   BMI 22.24 kg/m²    Body mass index is 22.24 " kg/m².    Gen: Alert and oriented, No apparent distress.  HEENT: Head atraumatic, normocephalic. Pupils equal and round.  Neck: Neck is supple without lymphadenopathy.   Lungs: Normal effort, CTA bilaterally, no wheezes, rhonchi, or rales  CV: Regular rate and rhythm. No murmurs, rubs, or gallops.  ABD: +BS. Non-tender, non-distended. No rebound, rigidity, or guarding.  Ext: No clubbing, cyanosis, edema.    Assessment & Plan:     38 y.o. female with the following -     1. Attention deficit hyperactivity disorder (ADHD), unspecified ADHD type  amphetamine-dextroamphetamine (ADDERALL XR) 20 MG per XR capsule        ADHD well controlled.  Refill sent to pharmacy.  Discussed medication desired effects, potential side effects, and how to administer the medication.  Discussed nonpharmacological interventions such as structure, routine, adequate sleep.  Recommend patient to see a therapist as well.  Patient verbalized understanding regarding plan of care and all questions answered.      Return in about 3 months (around 9/15/2023), or if symptoms worsen or fail to improve.    NOE Romero.   [facility]    Please note that this dictation was created using voice recognition software. I have made every reasonable attempt to correct obvious errors, but I expect that there are errors of grammar and possibly content that I did not discover before finalizing the note.

## 2023-07-13 ENCOUNTER — PATIENT MESSAGE (OUTPATIENT)
Dept: MEDICAL GROUP | Facility: IMAGING CENTER | Age: 38
End: 2023-07-13
Payer: COMMERCIAL

## 2023-07-13 DIAGNOSIS — F90.9 ATTENTION DEFICIT HYPERACTIVITY DISORDER (ADHD), UNSPECIFIED ADHD TYPE: ICD-10-CM

## 2023-07-13 RX ORDER — DEXTROAMPHETAMINE SACCHARATE, AMPHETAMINE ASPARTATE MONOHYDRATE, DEXTROAMPHETAMINE SULFATE AND AMPHETAMINE SULFATE 5; 5; 5; 5 MG/1; MG/1; MG/1; MG/1
40 CAPSULE, EXTENDED RELEASE ORAL DAILY
Qty: 60 CAPSULE | Refills: 0 | Status: SHIPPED | OUTPATIENT
Start: 2023-07-13 | End: 2023-08-12

## 2023-08-28 ENCOUNTER — OFFICE VISIT (OUTPATIENT)
Dept: MEDICAL GROUP | Facility: IMAGING CENTER | Age: 38
End: 2023-08-28
Payer: COMMERCIAL

## 2023-08-28 VITALS
DIASTOLIC BLOOD PRESSURE: 64 MMHG | HEART RATE: 82 BPM | SYSTOLIC BLOOD PRESSURE: 100 MMHG | OXYGEN SATURATION: 97 % | HEIGHT: 70 IN | RESPIRATION RATE: 16 BRPM | TEMPERATURE: 98.1 F | WEIGHT: 162.2 LBS | BODY MASS INDEX: 23.22 KG/M2

## 2023-08-28 DIAGNOSIS — T78.2XXD ANAPHYLAXIS, SUBSEQUENT ENCOUNTER: ICD-10-CM

## 2023-08-28 DIAGNOSIS — F90.9 ATTENTION DEFICIT HYPERACTIVITY DISORDER (ADHD), UNSPECIFIED ADHD TYPE: ICD-10-CM

## 2023-08-28 PROCEDURE — 3078F DIAST BP <80 MM HG: CPT

## 2023-08-28 PROCEDURE — 3074F SYST BP LT 130 MM HG: CPT

## 2023-08-28 PROCEDURE — 99214 OFFICE O/P EST MOD 30 MIN: CPT

## 2023-08-28 RX ORDER — DEXTROAMPHETAMINE SACCHARATE, AMPHETAMINE ASPARTATE MONOHYDRATE, DEXTROAMPHETAMINE SULFATE AND AMPHETAMINE SULFATE 5; 5; 5; 5 MG/1; MG/1; MG/1; MG/1
20 CAPSULE, EXTENDED RELEASE ORAL 2 TIMES DAILY
Qty: 60 CAPSULE | Refills: 0 | Status: SHIPPED | OUTPATIENT
Start: 2023-08-28 | End: 2023-10-11 | Stop reason: SDUPTHER

## 2023-08-28 RX ORDER — EPINEPHRINE 0.3 MG/.3ML
INJECTION SUBCUTANEOUS
Qty: 1 EACH | Refills: 0 | Status: SHIPPED | OUTPATIENT
Start: 2023-08-28

## 2023-08-28 RX ORDER — LIOTHYRONINE SODIUM 5 UG/1
5 TABLET ORAL
COMMUNITY
Start: 2023-08-03

## 2023-08-28 ASSESSMENT — ANXIETY QUESTIONNAIRES
3. WORRYING TOO MUCH ABOUT DIFFERENT THINGS: SEVERAL DAYS
GAD7 TOTAL SCORE: 5
7. FEELING AFRAID AS IF SOMETHING AWFUL MIGHT HAPPEN: NOT AT ALL
1. FEELING NERVOUS, ANXIOUS, OR ON EDGE: SEVERAL DAYS
2. NOT BEING ABLE TO STOP OR CONTROL WORRYING: NOT AT ALL
6. BECOMING EASILY ANNOYED OR IRRITABLE: NOT AT ALL
4. TROUBLE RELAXING: SEVERAL DAYS
5. BEING SO RESTLESS THAT IT IS HARD TO SIT STILL: MORE THAN HALF THE DAYS

## 2023-08-28 ASSESSMENT — FIBROSIS 4 INDEX: FIB4 SCORE: 0.55

## 2023-08-28 ASSESSMENT — PATIENT HEALTH QUESTIONNAIRE - PHQ9
SUM OF ALL RESPONSES TO PHQ QUESTIONS 1-9: 11
CLINICAL INTERPRETATION OF PHQ2 SCORE: 3
5. POOR APPETITE OR OVEREATING: 1 - SEVERAL DAYS

## 2023-08-28 NOTE — PROGRESS NOTES
Subjective:     CC:   Chief Complaint   Patient presents with    Follow-Up     For ADHD Medication       HPI:   Juanita presents today to discuss:    ADHD  Chronic condition. Patient is taking Adderall 20 mg XR BID daily. Tolerating medication well without side effects.   She is requesting med refill.  Patient denies symptoms of aggression, impulsivity, irritability, hyperactivity, difficulty with concentration.      Past Medical History:   Diagnosis Date    ADHD     Allergy     Anxiety     Depression     Hashimoto's disease     Hyperthyroidism     Restless leg syndrome      Family History   Problem Relation Age of Onset    Osteoporosis Mother     No Known Problems Father      Past Surgical History:   Procedure Laterality Date    MAMMOPLASTY AUGMENTATION  2011     Social History     Tobacco Use    Smoking status: Never    Smokeless tobacco: Never   Vaping Use    Vaping Use: Never used   Substance Use Topics    Alcohol use: Not Currently     Comment: rarely    Drug use: Not Currently     Types: Marijuana, Oral     Comment:  a few times a month/occ     Social History     Social History Narrative    Not on file     Current Outpatient Medications Ordered in Epic   Medication Sig Dispense Refill    amphetamine-dextroamphetamine (ADDERALL XR, 20MG,) 20 MG per XR capsule Take 1 Capsule by mouth 2 times a day for 30 days. 60 Capsule 0    EPINEPHrine (EPIPEN) 0.3 MG/0.3ML Solution Auto-injector solution for injection Inject 0.3 mL into the thigh one time for 1 dose. 1 Each 0    montelukast (SINGULAIR) 10 MG Tab Take 1 Tablet by mouth every day. 90 Tablet 3    SYNTHROID 150 MCG Tab TAKE ONE TABLET BY MOUTH EVERY MORNING ON AN EMPTY STOMACH 60 Tablet 0    Multiple Vitamin (MULTI-VITAMIN PO) Take  by mouth.      Cetirizine HCl (ZYRTEC ALLERGY PO) Take  by mouth.      albuterol 108 (90 Base) MCG/ACT Aero Soln inhalation aerosol Inhale 2 Puffs every 6 hours as needed for Shortness of Breath. 8.5 g 1    Blood Glucose Test Strips Use  "one One Touch Verio Flex strip to test blood sugar once daily . 30 Each 1    Alcohol Swabs Wipe site with prep pad prior to injection. 100 Each 0    MAGNESIUM PO Take  by mouth.      DIGESTIVE ENZYMES PO Take  by mouth.      Probiotic Product (PROBIOTIC PO) Take  by mouth.      liothyronine (CYTOMEL) 5 MCG Tab Take 5 mcg by mouth.      fluconazole (DIFLUCAN) 150 MG tablet Take 1 Tablet by mouth every day. (Patient not taking: Reported on 6/15/2023) 3 Tablet 3    fluconazole (DIFLUCAN) 150 MG tablet TAKE 1 TABLET BY MOUTH ONCE. MAY TAKE 2ND PILL 2 DAYS LATER IF SYMPTOMS PERSIST (Patient not taking: Reported on 3/14/2023)      Homeopathic Products (ZICAM ALLERGY RELIEF NA) Administer  into affected nostril(S). (Patient not taking: Reported on 6/15/2023)      fluticasone (FLOVENT HFA) 220 MCG/ACT Aerosol Inhale 1 Puff 2 times a day. (Patient not taking: Reported on 3/14/2023) 12 g 1    Spacer/Aero-Hold Chamber Bags Misc Dispense 1 spacer for albuterol MDI. (Patient not taking: Reported on 3/14/2023) 1 Each 0    Blood Glucose Meter Kit Test blood sugar as recommended by provider when near-syncopal for hypoglycemia. One Touch Verio blood glucose monitoring kit. (Patient not taking: Reported on 6/15/2023) 1 Kit 0    Lancets Use one One Touch Verio Flex lancet to test blood sugar once daily . (Patient not taking: Reported on 6/15/2023) 100 Each 0     No current [facility]-ordered facility-administered medications on file.     Eggs, Shellfish allergy, Wheat bran, Ibuprofen, and Tree nuts food allergy    ROS: see hpi  Gen: no fevers/chills  Pulm: no sob, no cough  CV: no chest pain, no palpitations, no edema  GI: no nausea/vomiting, no diarrhea  Skin: no rash    Objective:   Exam:  /64 (BP Location: Left arm, Patient Position: Sitting, BP Cuff Size: Adult)   Pulse 82   Temp 36.7 °C (98.1 °F) (Temporal)   Resp 16   Ht 1.778 m (5' 10\")   Wt 73.6 kg (162 lb 3.2 oz)   LMP 08/11/2023   SpO2 97%   BMI 23.27 kg/m²    Body " mass index is 23.27 kg/m².    Gen: Alert and oriented, No apparent distress.  HEENT: Head atraumatic, normocephalic. Pupils equal and round.  Neck: Neck is supple without lymphadenopathy.   Lungs: Normal effort, CTA bilaterally, no wheezes, rhonchi, or rales  CV: Regular rate and rhythm. No murmurs, rubs, or gallops.  ABD: +BS. Non-tender, non-distended. No rebound, rigidity, or guarding.  Ext: No clubbing, cyanosis, edema.    Assessment & Plan:     38 y.o. female with the following -     1. Attention deficit hyperactivity disorder (ADHD), unspecified ADHD type  Chronic and stable condition on Adderall.  Med refill sent to pharmacy.  Medication administration and side effects discussed.  Risk of dependence discussed.  Obtained and reviewed patient utilization report from [facility] pharmacy database on 8/28/2023 6:19 PM  prior to writing prescription for controlled substance II, III or IV per [place] bill . Based on assessment of the report, the prescription is medically necessary.     - amphetamine-dextroamphetamine (ADDERALL XR, 20MG,) 20 MG per XR capsule; Take 1 Capsule by mouth 2 times a day for 30 days.  Dispense: 60 Capsule; Refill: 0    2. Anaphylaxis, subsequent encounter  Established condition.  Patient requesting refill on EpiPen.  Patient administration and side effects discussed.  ED symptoms discussed.    - EPINEPHrine (EPIPEN) 0.3 MG/0.3ML Solution Auto-injector solution for injection; Inject 0.3 mL into the thigh one time for 1 dose.  Dispense: 1 Each; Refill: 0    Medical Decision Making/Course:  In the course of preparing for this visit with review of the pertinent past medical history, recent and past clinic visits, current medications, and performing chart, immunization, medical history and medication reconciliation, and in the further course of obtaining the current history pertinent to the clinic visit today, performing an exam and evaluation, ordering and independently evaluating labs,  tests, and/or procedures, prescribing any recommended new medications as noted above, providing any pertinent counseling and education and recommending further coordination of care. This was discussed with patient in a shared-decision making conversation, and they understand and agreed with plan of care.     Return in about 3 months (around 11/28/2023), or if symptoms worsen or fail to improve.    ADI Romero   [facility]    Please note that this dictation was created using voice recognition software. I have made every reasonable attempt to correct obvious errors, but I expect that there are errors of grammar and possibly content that I did not discover before finalizing the note.

## 2023-09-13 RX ORDER — MONTELUKAST SODIUM 10 MG/1
10 TABLET ORAL DAILY
Qty: 90 TABLET | Refills: 0 | Status: SHIPPED | OUTPATIENT
Start: 2023-09-13 | End: 2024-03-05

## 2023-09-13 NOTE — TELEPHONE ENCOUNTER
Received request via: Pharmacy    Was the patient seen in the last year in this department? Yes    Does the patient have an active prescription (recently filled or refills available) for medication(s) requested? No    Does the patient have [facility] and need 100 day supply (blood pressure, diabetes and cholesterol meds only)? Patient does not have SCP  Pharmacy request for 90 days supplies

## 2023-10-11 DIAGNOSIS — F90.9 ATTENTION DEFICIT HYPERACTIVITY DISORDER (ADHD), UNSPECIFIED ADHD TYPE: ICD-10-CM

## 2023-10-11 RX ORDER — DEXTROAMPHETAMINE SACCHARATE, AMPHETAMINE ASPARTATE MONOHYDRATE, DEXTROAMPHETAMINE SULFATE AND AMPHETAMINE SULFATE 5; 5; 5; 5 MG/1; MG/1; MG/1; MG/1
20 CAPSULE, EXTENDED RELEASE ORAL 2 TIMES DAILY
Qty: 60 CAPSULE | Refills: 0 | Status: SHIPPED | OUTPATIENT
Start: 2023-10-11 | End: 2023-11-09 | Stop reason: SDUPTHER

## 2023-10-11 NOTE — TELEPHONE ENCOUNTER
Received request via: Patient    Was the patient seen in the last year in this department? Yes    Does the patient have an active prescription (recently filled or refills available) for medication(s) requested? No    Does the patient have [facility] living [facility] and need 100 day supply (blood pressure, diabetes and cholesterol meds only)? Patient does not have SCP

## 2023-10-12 DIAGNOSIS — J45.20 MILD INTERMITTENT ASTHMA WITHOUT COMPLICATION: ICD-10-CM

## 2023-10-12 RX ORDER — ALBUTEROL SULFATE 90 UG/1
2 AEROSOL, METERED RESPIRATORY (INHALATION) EVERY 6 HOURS PRN
Qty: 8.5 G | Refills: 1 | Status: SHIPPED | OUTPATIENT
Start: 2023-10-12

## 2023-11-09 ENCOUNTER — PATIENT MESSAGE (OUTPATIENT)
Dept: MEDICAL GROUP | Facility: IMAGING CENTER | Age: 38
End: 2023-11-09
Payer: COMMERCIAL

## 2023-11-09 DIAGNOSIS — F90.9 ATTENTION DEFICIT HYPERACTIVITY DISORDER (ADHD), UNSPECIFIED ADHD TYPE: ICD-10-CM

## 2023-11-10 RX ORDER — DEXTROAMPHETAMINE SACCHARATE, AMPHETAMINE ASPARTATE MONOHYDRATE, DEXTROAMPHETAMINE SULFATE AND AMPHETAMINE SULFATE 5; 5; 5; 5 MG/1; MG/1; MG/1; MG/1
20 CAPSULE, EXTENDED RELEASE ORAL 2 TIMES DAILY
Qty: 60 CAPSULE | Refills: 0 | Status: SHIPPED | OUTPATIENT
Start: 2023-11-10 | End: 2023-11-16 | Stop reason: SDUPTHER

## 2023-11-16 ENCOUNTER — OFFICE VISIT (OUTPATIENT)
Dept: MEDICAL GROUP | Facility: IMAGING CENTER | Age: 38
End: 2023-11-16
Payer: COMMERCIAL

## 2023-11-16 VITALS
OXYGEN SATURATION: 99 % | HEIGHT: 69 IN | BODY MASS INDEX: 23.49 KG/M2 | TEMPERATURE: 98.2 F | RESPIRATION RATE: 14 BRPM | SYSTOLIC BLOOD PRESSURE: 104 MMHG | HEART RATE: 73 BPM | DIASTOLIC BLOOD PRESSURE: 78 MMHG | WEIGHT: 158.6 LBS

## 2023-11-16 DIAGNOSIS — Z13.79 GENETIC TESTING: ICD-10-CM

## 2023-11-16 DIAGNOSIS — Z12.31 ENCOUNTER FOR SCREENING MAMMOGRAM FOR MALIGNANT NEOPLASM OF BREAST: ICD-10-CM

## 2023-11-16 DIAGNOSIS — F90.9 ATTENTION DEFICIT HYPERACTIVITY DISORDER (ADHD), UNSPECIFIED ADHD TYPE: ICD-10-CM

## 2023-11-16 DIAGNOSIS — Z00.00 WELLNESS EXAMINATION: ICD-10-CM

## 2023-11-16 PROCEDURE — 99213 OFFICE O/P EST LOW 20 MIN: CPT

## 2023-11-16 PROCEDURE — 3074F SYST BP LT 130 MM HG: CPT

## 2023-11-16 PROCEDURE — 3078F DIAST BP <80 MM HG: CPT

## 2023-11-16 RX ORDER — DEXTROAMPHETAMINE SACCHARATE, AMPHETAMINE ASPARTATE MONOHYDRATE, DEXTROAMPHETAMINE SULFATE AND AMPHETAMINE SULFATE 5; 5; 5; 5 MG/1; MG/1; MG/1; MG/1
20 CAPSULE, EXTENDED RELEASE ORAL 2 TIMES DAILY
Qty: 60 CAPSULE | Refills: 0 | Status: SHIPPED | OUTPATIENT
Start: 2023-11-16 | End: 2023-12-08 | Stop reason: SDUPTHER

## 2023-11-16 ASSESSMENT — FIBROSIS 4 INDEX: FIB4 SCORE: 0.55

## 2023-11-16 NOTE — PROGRESS NOTES
Subjective:     Chief Complaint   Patient presents with    Follow-Up     Medication for adderall      HPI:   Juanita presents today to discuss:    ADHD  Chronic condition. Patient is taking Adderall 20 mg XR BID daily. Tolerating medication well without side effects.   She is requesting med refill.  Patient denies symptoms of aggression, impulsivity, irritability, hyperactivity, difficulty with concentration.        Past Medical History:   Diagnosis Date    ADHD     Allergy     Anxiety     Depression     Hashimoto's disease     Hyperthyroidism     Restless leg syndrome      Family History   Problem Relation Age of Onset    Osteoporosis Mother     No Known Problems Father      Past Surgical History:   Procedure Laterality Date    MAMMOPLASTY AUGMENTATION  2011     Social History     Tobacco Use    Smoking status: Never    Smokeless tobacco: Never   Vaping Use    Vaping Use: Never used   Substance Use Topics    Alcohol use: Not Currently     Comment: rarely    Drug use: Not Currently     Types: Marijuana, Oral     Comment:  a few times a month/occ     Social History     Social History Narrative    Not on file     Current Outpatient Medications Ordered in Epic   Medication Sig Dispense Refill    amphetamine-dextroamphetamine (ADDERALL XR, 20MG,) 20 MG per XR capsule Take 1 Capsule by mouth 2 times a day for 30 days. 60 Capsule 0    albuterol 108 (90 Base) MCG/ACT Aero Soln inhalation aerosol Inhale 2 Puffs every 6 hours as needed for Shortness of Breath. 8.5 g 1    montelukast (SINGULAIR) 10 MG Tab Take 1 Tablet by mouth every day. 90 Tablet 0    liothyronine (CYTOMEL) 5 MCG Tab Take 5 mcg by mouth.      EPINEPHrine (EPIPEN) 0.3 MG/0.3ML Solution Auto-injector solution for injection Inject 0.3 mL into the thigh one time for 1 dose. 1 Each 0    SYNTHROID 150 MCG Tab TAKE ONE TABLET BY MOUTH EVERY MORNING ON AN EMPTY STOMACH 60 Tablet 0    Multiple Vitamin (MULTI-VITAMIN PO) Take  by mouth.      Cetirizine HCl (ZYRTEC  "ALLERGY PO) Take  by mouth.      Alcohol Swabs Wipe site with prep pad prior to injection. 100 Each 0    MAGNESIUM PO Take  by mouth.      DIGESTIVE ENZYMES PO Take  by mouth.      Probiotic Product (PROBIOTIC PO) Take  by mouth.      fluconazole (DIFLUCAN) 150 MG tablet Take 1 Tablet by mouth every day. (Patient not taking: Reported on 6/15/2023) 3 Tablet 3    fluconazole (DIFLUCAN) 150 MG tablet TAKE 1 TABLET BY MOUTH ONCE. MAY TAKE 2ND PILL 2 DAYS LATER IF SYMPTOMS PERSIST (Patient not taking: Reported on 3/14/2023)      Homeopathic Products (ZICAM ALLERGY RELIEF NA) Administer  into affected nostril(S). (Patient not taking: Reported on 6/15/2023)      fluticasone (FLOVENT HFA) 220 MCG/ACT Aerosol Inhale 1 Puff 2 times a day. (Patient not taking: Reported on 3/14/2023) 12 g 1    Spacer/Aero-Hold Chamber Bags Misc Dispense 1 spacer for albuterol MDI. (Patient not taking: Reported on 3/14/2023) 1 Each 0    Blood Glucose Meter Kit Test blood sugar as recommended by provider when near-syncopal for hypoglycemia. One Touch Verio blood glucose monitoring kit. (Patient not taking: Reported on 6/15/2023) 1 Kit 0    Blood Glucose Test Strips Use one One Touch Verio Flex strip to test blood sugar once daily . (Patient not taking: Reported on 11/16/2023) 30 Each 1    Lancets Use one One Touch Verio Flex lancet to test blood sugar once daily . (Patient not taking: Reported on 6/15/2023) 100 Each 0     No current Epic-ordered facility-administered medications on file.     Eggs, Shellfish allergy, Wheat bran, Ibuprofen, and Tree nuts food allergy    ROS: see hpi  Gen: no fevers/chills  Pulm: no sob, no cough  CV: no chest pain, no palpitations, no edema  GI: no nausea/vomiting, no diarrhea  Skin: no rash    Objective:   Exam:  /78 (BP Location: Left arm, Patient Position: Sitting, BP Cuff Size: Adult)   Pulse 73   Temp 36.8 °C (98.2 °F) (Temporal)   Resp 14   Ht 1.753 m (5' 9\")   Wt 71.9 kg (158 lb 9.6 oz)   LMP " 11/05/2023   SpO2 99%   BMI 23.42 kg/m²    Body mass index is 23.42 kg/m².    Gen: Alert and oriented, No apparent distress.  HEENT: Head atraumatic, normocephalic. Pupils equal and round.  Neck: Neck is supple without lymphadenopathy.   Lungs: Normal effort, CTA bilaterally, no wheezes, rhonchi, or rales  CV: Regular rate and rhythm. No murmurs, rubs, or gallops.  ABD: +BS. Non-tender, non-distended. No rebound, rigidity, or guarding.  Ext: No clubbing, cyanosis, edema.    Assessment & Plan:     38 y.o. female with the following -     1. Attention deficit hyperactivity disorder (ADHD), unspecified ADHD type  ADHD well controlled.  Refill sent to pharmacy.  Discussed medication desired effects, potential side effects, and how to administer the medication.  Discussed nonpharmacological interventions such as structure, routine, adequate sleep.  Recommend patient to see a therapist as well.  Patient verbalized understanding regarding plan of care and all questions answered.     - CBC WITH DIFFERENTIAL; Future  - Comp Metabolic Panel; Future  - VITAMIN D,25 HYDROXY (DEFICIENCY); Future  - HEMOGLOBIN A1C; Future  - amphetamine-dextroamphetamine (ADDERALL XR, 20MG,) 20 MG per XR capsule; Take 1 Capsule by mouth 2 times a day for 30 days.  Dispense: 60 Capsule; Refill: 0    2. Wellness examination  PMH/PSH/FH/Social history reviewed. Medication reconciled. Vaccinations discussed. Previous records and labs reviewed. Discussed age appropriate anticipatory guidance.  Will screen for anemia, metabolic disorder, cardiovascular disease, diabetes, and vitamin deficiency. Will order labs.    - CBC WITH DIFFERENTIAL; Future  - Comp Metabolic Panel; Future  - Lipid Profile; Future  - VITAMIN D,25 HYDROXY (DEFICIENCY); Future  - HEMOGLOBIN A1C; Future    3. Genetic testing    - Referral to Genetic Research Studies    4. Encounter for screening mammogram for malignant neoplasm of breast    - MA-SCREENING MAMMO BILAT W/ IMPLANTS  W/CAD; Future    Medical Decision Making/Course:  In the course of preparing for this visit with review of the pertinent past medical history, recent and past clinic visits, current medications, and performing chart, immunization, medical history and medication reconciliation, and in the further course of obtaining the current history pertinent to the clinic visit today, performing an exam and evaluation, ordering and independently evaluating labs, tests, and/or procedures, prescribing any recommended new medications as noted above, providing any pertinent counseling and education and recommending further coordination of care. This was discussed with patient in a shared-decision making conversation, and they understand and agreed with plan of care.     Return in about 3 months (around 2/16/2024), or if symptoms worsen or fail to improve.    NOE Romero.   [facility]    Please note that this dictation was created using voice recognition software. I have made every reasonable attempt to correct obvious errors, but I expect that there are errors of grammar and possibly content that I did not discover before finalizing the note.

## 2023-11-30 ENCOUNTER — APPOINTMENT (OUTPATIENT)
Dept: RADIOLOGY | Facility: MEDICAL CENTER | Age: 38
End: 2023-11-30
Payer: COMMERCIAL

## 2023-12-08 ENCOUNTER — PATIENT MESSAGE (OUTPATIENT)
Dept: MEDICAL GROUP | Facility: IMAGING CENTER | Age: 38
End: 2023-12-08
Payer: COMMERCIAL

## 2023-12-08 DIAGNOSIS — F90.9 ATTENTION DEFICIT HYPERACTIVITY DISORDER (ADHD), UNSPECIFIED ADHD TYPE: ICD-10-CM

## 2023-12-08 RX ORDER — DEXTROAMPHETAMINE SACCHARATE, AMPHETAMINE ASPARTATE MONOHYDRATE, DEXTROAMPHETAMINE SULFATE AND AMPHETAMINE SULFATE 5; 5; 5; 5 MG/1; MG/1; MG/1; MG/1
20 CAPSULE, EXTENDED RELEASE ORAL 2 TIMES DAILY
Qty: 60 CAPSULE | Refills: 0 | Status: SHIPPED | OUTPATIENT
Start: 2023-12-08 | End: 2024-01-07

## 2023-12-08 NOTE — PATIENT COMMUNICATION
Received request via: Patient    Was the patient seen in the last year in this department? Yes    Does the patient have an active prescription (recently filled or refills available) for medication(s) requested? No    Does the patient have [facility] and need 100 day supply (blood pressure, diabetes and cholesterol meds only)? Patient does not have SCP

## 2023-12-15 ENCOUNTER — APPOINTMENT (OUTPATIENT)
Dept: RADIOLOGY | Facility: MEDICAL CENTER | Age: 38
End: 2023-12-15
Payer: COMMERCIAL

## 2023-12-15 DIAGNOSIS — Z12.31 ENCOUNTER FOR SCREENING MAMMOGRAM FOR MALIGNANT NEOPLASM OF BREAST: ICD-10-CM

## 2023-12-15 PROCEDURE — 77063 BREAST TOMOSYNTHESIS BI: CPT

## 2023-12-20 ENCOUNTER — RESEARCH ENCOUNTER (OUTPATIENT)
Dept: RESEARCH | Facility: MEDICAL CENTER | Age: 38
End: 2023-12-20
Payer: COMMERCIAL

## 2023-12-20 DIAGNOSIS — Z00.6 RESEARCH STUDY PATIENT: ICD-10-CM

## 2023-12-20 NOTE — RESEARCH NOTE
Confirmed with the participant which designated provider (ADI Romero) they would like study results shared with. Patient will have an opportunity to share the results with any providers of their choosing in the future by accessing their results from [employer].

## 2024-01-09 ENCOUNTER — HOSPITAL ENCOUNTER (OUTPATIENT)
Dept: LAB | Facility: MEDICAL CENTER | Age: 39
End: 2024-01-09
Payer: COMMERCIAL

## 2024-01-09 DIAGNOSIS — Z00.6 RESEARCH STUDY PATIENT: ICD-10-CM

## 2024-01-09 DIAGNOSIS — F90.9 ATTENTION DEFICIT HYPERACTIVITY DISORDER (ADHD), UNSPECIFIED ADHD TYPE: ICD-10-CM

## 2024-01-09 RX ORDER — DEXTROAMPHETAMINE SACCHARATE, AMPHETAMINE ASPARTATE MONOHYDRATE, DEXTROAMPHETAMINE SULFATE AND AMPHETAMINE SULFATE 5; 5; 5; 5 MG/1; MG/1; MG/1; MG/1
40 CAPSULE, EXTENDED RELEASE ORAL EVERY MORNING
Qty: 60 CAPSULE | Refills: 0 | Status: SHIPPED | OUTPATIENT
Start: 2024-01-09 | End: 2024-01-17 | Stop reason: SDUPTHER

## 2024-01-10 ENCOUNTER — APPOINTMENT (OUTPATIENT)
Dept: MEDICAL GROUP | Facility: IMAGING CENTER | Age: 39
End: 2024-01-10
Payer: COMMERCIAL

## 2024-01-17 ENCOUNTER — HOSPITAL ENCOUNTER (OUTPATIENT)
Facility: MEDICAL CENTER | Age: 39
End: 2024-01-17
Payer: COMMERCIAL

## 2024-01-17 ENCOUNTER — OFFICE VISIT (OUTPATIENT)
Dept: MEDICAL GROUP | Facility: IMAGING CENTER | Age: 39
End: 2024-01-17
Payer: COMMERCIAL

## 2024-01-17 VITALS
HEART RATE: 71 BPM | RESPIRATION RATE: 18 BRPM | TEMPERATURE: 96.7 F | DIASTOLIC BLOOD PRESSURE: 76 MMHG | SYSTOLIC BLOOD PRESSURE: 108 MMHG | HEIGHT: 69 IN | WEIGHT: 158 LBS | BODY MASS INDEX: 23.4 KG/M2 | OXYGEN SATURATION: 97 %

## 2024-01-17 DIAGNOSIS — Z79.899 ON STIMULANT MEDICATION: ICD-10-CM

## 2024-01-17 DIAGNOSIS — F90.9 ATTENTION DEFICIT HYPERACTIVITY DISORDER (ADHD), UNSPECIFIED ADHD TYPE: ICD-10-CM

## 2024-01-17 PROCEDURE — G0480 DRUG TEST DEF 1-7 CLASSES: HCPCS

## 2024-01-17 PROCEDURE — 3078F DIAST BP <80 MM HG: CPT

## 2024-01-17 PROCEDURE — 80307 DRUG TEST PRSMV CHEM ANLYZR: CPT

## 2024-01-17 PROCEDURE — 3074F SYST BP LT 130 MM HG: CPT

## 2024-01-17 PROCEDURE — 99213 OFFICE O/P EST LOW 20 MIN: CPT

## 2024-01-17 RX ORDER — DEXTROAMPHETAMINE SACCHARATE, AMPHETAMINE ASPARTATE MONOHYDRATE, DEXTROAMPHETAMINE SULFATE AND AMPHETAMINE SULFATE 5; 5; 5; 5 MG/1; MG/1; MG/1; MG/1
40 CAPSULE, EXTENDED RELEASE ORAL EVERY MORNING
Qty: 60 CAPSULE | Refills: 0 | Status: SHIPPED | OUTPATIENT
Start: 2024-02-07 | End: 2024-03-08

## 2024-01-17 ASSESSMENT — PATIENT HEALTH QUESTIONNAIRE - PHQ9
SUM OF ALL RESPONSES TO PHQ QUESTIONS 1-9: 8
5. POOR APPETITE OR OVEREATING: 1 - SEVERAL DAYS
CLINICAL INTERPRETATION OF PHQ2 SCORE: 3

## 2024-01-17 ASSESSMENT — FIBROSIS 4 INDEX: FIB4 SCORE: 0.55

## 2024-01-17 NOTE — PROGRESS NOTES
Subjective:     CC:   Chief Complaint   Patient presents with    Medication Refill     Adderall       HPI:   Juanita presents today to discuss:    ADHD  Chronic condition. Patient is taking Adderall 20 mg XR BID, total daily dose of 40 mg. Tolerating medication well without side effects.   Med refill sent to pharmacy.  Patient denies symptoms of aggression, impulsivity, irritability, hyperactivity, difficulty with concentration.    Past Medical History:   Diagnosis Date    ADHD     Allergy     Anxiety     Depression     Hashimoto's disease     Hyperthyroidism     Restless leg syndrome      Family History   Problem Relation Age of Onset    Osteoporosis Mother     No Known Problems Father      Past Surgical History:   Procedure Laterality Date    MAMMOPLASTY AUGMENTATION  2011    MT BREAST AUGMENTATION WITH IMPLANT  2011     Social History     Tobacco Use    Smoking status: Never    Smokeless tobacco: Never   Vaping Use    Vaping Use: Never used   Substance Use Topics    Alcohol use: Not Currently     Comment: rarely    Drug use: Not Currently     Types: Marijuana, Oral     Comment:  a few times a month/occ     Social History     Social History Narrative    Not on file     Current Outpatient Medications Ordered in Epic   Medication Sig Dispense Refill    [START ON 2/7/2024] amphetamine-dextroamphetamine (ADDERALL XR) 20 MG per XR capsule Take 2 Capsules by mouth every morning for 30 days. 60 Capsule 0    albuterol 108 (90 Base) MCG/ACT Aero Soln inhalation aerosol Inhale 2 Puffs every 6 hours as needed for Shortness of Breath. 8.5 g 1    montelukast (SINGULAIR) 10 MG Tab Take 1 Tablet by mouth every day. 90 Tablet 0    liothyronine (CYTOMEL) 5 MCG Tab Take 5 mcg by mouth.      EPINEPHrine (EPIPEN) 0.3 MG/0.3ML Solution Auto-injector solution for injection Inject 0.3 mL into the thigh one time for 1 dose. 1 Each 0    fluconazole (DIFLUCAN) 150 MG tablet       SYNTHROID 150 MCG Tab TAKE ONE TABLET BY MOUTH EVERY MORNING  "ON AN EMPTY STOMACH 60 Tablet 0    Multiple Vitamin (MULTI-VITAMIN PO) Take  by mouth.      Cetirizine HCl (ZYRTEC ALLERGY PO) Take  by mouth.      MAGNESIUM PO Take  by mouth.      DIGESTIVE ENZYMES PO Take  by mouth.      Probiotic Product (PROBIOTIC PO) Take  by mouth.      fluconazole (DIFLUCAN) 150 MG tablet Take 1 Tablet by mouth every day. (Patient not taking: Reported on 6/15/2023) 3 Tablet 3    Homeopathic Products (ZICAM ALLERGY RELIEF NA) Administer  into affected nostril(S). (Patient not taking: Reported on 6/15/2023)      fluticasone (FLOVENT HFA) 220 MCG/ACT Aerosol Inhale 1 Puff 2 times a day. (Patient not taking: Reported on 3/14/2023) 12 g 1    Spacer/Aero-Hold Chamber Bags Misc Dispense 1 spacer for albuterol MDI. (Patient not taking: Reported on 3/14/2023) 1 Each 0    Blood Glucose Meter Kit Test blood sugar as recommended by provider when near-syncopal for hypoglycemia. One Touch Verio blood glucose monitoring kit. (Patient not taking: Reported on 6/15/2023) 1 Kit 0    Blood Glucose Test Strips Use one One Touch Verio Flex strip to test blood sugar once daily . (Patient not taking: Reported on 11/16/2023) 30 Each 1    Lancets Use one One Touch Verio Flex lancet to test blood sugar once daily . (Patient not taking: Reported on 6/15/2023) 100 Each 0    Alcohol Swabs Wipe site with prep pad prior to injection. (Patient not taking: Reported on 1/17/2024) 100 Each 0     No current [facility]-ordered facility-administered medications on file.     Eggs, Shellfish allergy, Wheat bran, Ibuprofen, and Tree nuts food allergy    ROS: see hpi  Gen: no fevers/chills  Pulm: no sob, no cough  CV: no chest pain, no palpitations, no edema  GI: no nausea/vomiting, no diarrhea  Skin: no rash    Objective:   Exam:  /76 (BP Location: Right arm, Patient Position: Sitting, BP Cuff Size: Adult)   Pulse 71   Temp 35.9 °C (96.7 °F)   Resp 18   Ht 1.753 m (5' 9\")   Wt 71.7 kg (158 lb)   SpO2 97%   BMI 23.33 kg/m²  "   Body mass index is 23.33 kg/m².    Gen: Alert and oriented, No apparent distress.  HEENT: Head atraumatic, normocephalic. Pupils equal and round.  Neck: Neck is supple without lymphadenopathy.   Lungs: Normal effort, CTA bilaterally, no wheezes, rhonchi, or rales  CV: Regular rate and rhythm. No murmurs, rubs, or gallops.  ABD: +BS. Non-tender, non-distended. No rebound, rigidity, or guarding.  Ext: No clubbing, cyanosis, edema.    Assessment & Plan:     38 y.o. female with the following -     1. Attention deficit hyperactivity disorder (ADHD), unspecified ADHD type  amphetamine-dextroamphetamine (ADDERALL XR) 20 MG per XR capsule      2. On stimulant medication  URINE DRUG SCREEN      ADHD well controlled on adderall.  Refill sent to pharmacy.  Discussed medication desired effects, potential side effects, and how to administer the medication.  Discussed nonpharmacological interventions such as structure, routine, adequate sleep.  Recommend patient to see a therapist as well.  Patient verbalized understanding regarding plan of care and all questions answered.  Obtained and reviewed patient utilization report from [facility] pharmacy database on 1/17/2024 2:19 PM  prior to writing prescription for controlled substance II, III or IV per [place] bill . Based on assessment of the report, the prescription is medically necessary.     Medical Decision Making/Course:  In the course of preparing for this visit with review of the pertinent past medical history, recent and past clinic visits, current medications, and performing chart, immunization, medical history and medication reconciliation, and in the further course of obtaining the current history pertinent to the clinic visit today, performing an exam and evaluation, ordering and independently evaluating labs, tests, and/or procedures, prescribing any recommended new medications as noted above, providing any pertinent counseling and education and recommending  further coordination of care. This was discussed with patient in a shared-decision making conversation, and they understand and agreed with plan of care.     Return in about 3 months (around 4/17/2024), or if symptoms worsen or fail to improve.    NOE Romero.   [facility]    Please note that this dictation was created using voice recognition software. I have made every reasonable attempt to correct obvious errors, but I expect that there are errors of grammar and possibly content that I did not discover before finalizing the note.

## 2024-01-19 LAB
AMPHET CTO UR CFM-MCNC: NORMAL NG/ML
BARBITURATES CTO UR CFM-MCNC: NEGATIVE NG/ML
BENZODIAZ CTO UR CFM-MCNC: NEGATIVE NG/ML
CANNABINOIDS CTO UR CFM-MCNC: NEGATIVE NG/ML
COCAINE CTO UR CFM-MCNC: NEGATIVE NG/ML
CREAT UR-MCNC: 84.5 MG/DL (ref 20–400)
DRUG COMMENT 753798: NORMAL
METHADONE CTO UR CFM-MCNC: NEGATIVE NG/ML
OPIATES CTO UR CFM-MCNC: NEGATIVE NG/ML
PCP CTO UR CFM-MCNC: NEGATIVE NG/ML
PROPOXYPH CTO UR CFM-MCNC: NEGATIVE NG/ML

## 2024-01-21 LAB
AMPHET UR CFM-MCNC: >5000 NG/ML
MDA UR CFM-MCNC: <200 NG/ML
MDEA UR CFM-MCNC: <200 NG/ML
MDMA UR CFM-MCNC: <200 NG/ML
METHAMPHET UR CFM-MCNC: <200 NG/ML
PHENTERMINE UR CFM-MCNC: <200 NG/ML

## 2024-02-18 LAB
APOB+LDLR+PCSK9 GENE MUT ANL BLD/T: NOT DETECTED
BRCA1+BRCA2 DEL+DUP + FULL MUT ANL BLD/T: NOT DETECTED
MLH1+MSH2+MSH6+PMS2 GN DEL+DUP+FUL M: NOT DETECTED

## 2024-03-05 RX ORDER — MONTELUKAST SODIUM 10 MG/1
10 TABLET ORAL DAILY
Qty: 90 TABLET | Refills: 2 | Status: SHIPPED | OUTPATIENT
Start: 2024-03-05

## 2024-03-05 NOTE — TELEPHONE ENCOUNTER
Received request via: Pharmacy    Was the patient seen in the last year in this department? Yes    Does the patient have an active prescription (recently filled or refills available) for medication(s) requested? No    Pharmacy Name: [facility]     Does the patient have [facility] and need 100 day supply (blood pressure, diabetes and cholesterol meds only)? Patient does not have SCP

## 2024-04-08 RX ORDER — DEXTROAMPHETAMINE SACCHARATE, AMPHETAMINE ASPARTATE MONOHYDRATE, DEXTROAMPHETAMINE SULFATE AND AMPHETAMINE SULFATE 5; 5; 5; 5 MG/1; MG/1; MG/1; MG/1
20 CAPSULE, EXTENDED RELEASE ORAL 2 TIMES DAILY
COMMUNITY
Start: 2024-03-08 | End: 2024-04-10

## 2024-04-08 RX ORDER — DEXTROAMPHETAMINE SACCHARATE, AMPHETAMINE ASPARTATE MONOHYDRATE, DEXTROAMPHETAMINE SULFATE AND AMPHETAMINE SULFATE 5; 5; 5; 5 MG/1; MG/1; MG/1; MG/1
20 CAPSULE, EXTENDED RELEASE ORAL 2 TIMES DAILY
Qty: 30 CAPSULE | Status: CANCELLED | OUTPATIENT
Start: 2024-04-08

## 2024-04-08 NOTE — TELEPHONE ENCOUNTER
Received request via: Patient    Was the patient seen in the last year in this department? Yes    Does the patient have an active prescription (recently filled or refills available) for medication(s) requested? No    Pharmacy Name: [id number]    Does the patient have detention [facility] and need 100 day supply (blood pressure, diabetes and cholesterol meds only)? Patient does not have SCP

## 2024-04-10 ENCOUNTER — APPOINTMENT (OUTPATIENT)
Dept: MEDICAL GROUP | Facility: IMAGING CENTER | Age: 39
End: 2024-04-10
Payer: COMMERCIAL

## 2024-04-10 VITALS
OXYGEN SATURATION: 95 % | BODY MASS INDEX: 23.19 KG/M2 | HEIGHT: 70 IN | SYSTOLIC BLOOD PRESSURE: 114 MMHG | DIASTOLIC BLOOD PRESSURE: 74 MMHG | TEMPERATURE: 97.6 F | RESPIRATION RATE: 14 BRPM | HEART RATE: 80 BPM | WEIGHT: 162 LBS

## 2024-04-10 DIAGNOSIS — F90.9 ATTENTION DEFICIT HYPERACTIVITY DISORDER (ADHD), UNSPECIFIED ADHD TYPE: ICD-10-CM

## 2024-04-10 PROCEDURE — 3078F DIAST BP <80 MM HG: CPT

## 2024-04-10 PROCEDURE — 3074F SYST BP LT 130 MM HG: CPT

## 2024-04-10 PROCEDURE — 99213 OFFICE O/P EST LOW 20 MIN: CPT

## 2024-04-10 RX ORDER — DEXTROAMPHETAMINE SACCHARATE, AMPHETAMINE ASPARTATE MONOHYDRATE, DEXTROAMPHETAMINE SULFATE AND AMPHETAMINE SULFATE 7.5; 7.5; 7.5; 7.5 MG/1; MG/1; MG/1; MG/1
30 CAPSULE, EXTENDED RELEASE ORAL EVERY MORNING
Qty: 30 CAPSULE | Refills: 0 | Status: SHIPPED | OUTPATIENT
Start: 2024-05-11 | End: 2024-06-10

## 2024-04-10 RX ORDER — DEXTROAMPHETAMINE SACCHARATE, AMPHETAMINE ASPARTATE MONOHYDRATE, DEXTROAMPHETAMINE SULFATE AND AMPHETAMINE SULFATE 5; 5; 5; 5 MG/1; MG/1; MG/1; MG/1
20 CAPSULE, EXTENDED RELEASE ORAL 2 TIMES DAILY
Qty: 60 CAPSULE | Refills: 0 | Status: CANCELLED | OUTPATIENT
Start: 2024-05-11 | End: 2024-06-10

## 2024-04-10 RX ORDER — DEXTROAMPHETAMINE SACCHARATE, AMPHETAMINE ASPARTATE MONOHYDRATE, DEXTROAMPHETAMINE SULFATE AND AMPHETAMINE SULFATE 7.5; 7.5; 7.5; 7.5 MG/1; MG/1; MG/1; MG/1
30 CAPSULE, EXTENDED RELEASE ORAL EVERY MORNING
Qty: 30 CAPSULE | Refills: 0 | Status: SHIPPED | OUTPATIENT
Start: 2024-06-11 | End: 2024-07-11

## 2024-04-10 RX ORDER — DEXTROAMPHETAMINE SACCHARATE, AMPHETAMINE ASPARTATE MONOHYDRATE, DEXTROAMPHETAMINE SULFATE AND AMPHETAMINE SULFATE 7.5; 7.5; 7.5; 7.5 MG/1; MG/1; MG/1; MG/1
30 CAPSULE, EXTENDED RELEASE ORAL EVERY MORNING
Qty: 30 CAPSULE | Refills: 0 | Status: SHIPPED | OUTPATIENT
Start: 2024-04-10 | End: 2024-05-10

## 2024-04-10 RX ORDER — DEXTROAMPHETAMINE SACCHARATE, AMPHETAMINE ASPARTATE MONOHYDRATE, DEXTROAMPHETAMINE SULFATE AND AMPHETAMINE SULFATE 5; 5; 5; 5 MG/1; MG/1; MG/1; MG/1
20 CAPSULE, EXTENDED RELEASE ORAL 2 TIMES DAILY
Qty: 60 CAPSULE | Refills: 0 | Status: CANCELLED | OUTPATIENT
Start: 2024-04-10 | End: 2024-05-10

## 2024-04-10 RX ORDER — DEXTROAMPHETAMINE SACCHARATE, AMPHETAMINE ASPARTATE, DEXTROAMPHETAMINE SULFATE AND AMPHETAMINE SULFATE 2.5; 2.5; 2.5; 2.5 MG/1; MG/1; MG/1; MG/1
10 TABLET ORAL DAILY
Qty: 30 TABLET | Refills: 0 | Status: SHIPPED | OUTPATIENT
Start: 2024-04-10 | End: 2024-05-10

## 2024-04-10 RX ORDER — DEXTROAMPHETAMINE SACCHARATE, AMPHETAMINE ASPARTATE, DEXTROAMPHETAMINE SULFATE AND AMPHETAMINE SULFATE 2.5; 2.5; 2.5; 2.5 MG/1; MG/1; MG/1; MG/1
10 TABLET ORAL DAILY
Qty: 30 TABLET | Refills: 0 | Status: SHIPPED | OUTPATIENT
Start: 2024-06-11 | End: 2024-07-11

## 2024-04-10 RX ORDER — DEXTROAMPHETAMINE SACCHARATE, AMPHETAMINE ASPARTATE, DEXTROAMPHETAMINE SULFATE AND AMPHETAMINE SULFATE 2.5; 2.5; 2.5; 2.5 MG/1; MG/1; MG/1; MG/1
10 TABLET ORAL DAILY
Qty: 30 TABLET | Refills: 0 | Status: SHIPPED | OUTPATIENT
Start: 2024-05-11 | End: 2024-06-10

## 2024-04-10 RX ORDER — DEXTROAMPHETAMINE SACCHARATE, AMPHETAMINE ASPARTATE MONOHYDRATE, DEXTROAMPHETAMINE SULFATE AND AMPHETAMINE SULFATE 5; 5; 5; 5 MG/1; MG/1; MG/1; MG/1
20 CAPSULE, EXTENDED RELEASE ORAL 2 TIMES DAILY
Qty: 60 CAPSULE | Refills: 0 | Status: CANCELLED | OUTPATIENT
Start: 2024-06-11 | End: 2024-07-11

## 2024-04-10 NOTE — PROGRESS NOTES
Subjective:     CC:   Chief Complaint   Patient presents with    Follow-Up     Medication ADHD       HPI:   Juanita presents today to discuss:    ADHD  Chronic condition. Patient is currently taking Adderall 20 mg XR BID, total daily dose of 40 mg. Tolerating medication well without side effects.   However, this regimen is not as effective as her previous regimen. The am dose is not lasting long enough and she is having breakthrough symptoms.   She was previously taking Adderall 30 mg XR in am and Adderral 10 mg tab in afternoon.   Her regimen was changed a few months ago due to shortage on adderral 10 mg tablet. However, this strength is now in stock.   She is requesting to resume this regimen.  Patient denies symptoms of aggression, impulsivity, irritability, hyperactivity, difficulty with concentration.    She will be seeing her gynecology this weekend for f/u and for lab work specifically to check her hormones.     She continues to f/u with endocrinology for her thyroid disorder.        Past Medical History:   Diagnosis Date    ADHD     Allergy     Anxiety     Depression     Hashimoto's disease     Hyperthyroidism     Restless leg syndrome      Family History   Problem Relation Age of Onset    Osteoporosis Mother     No Known Problems Father      Past Surgical History:   Procedure Laterality Date    MAMMOPLASTY AUGMENTATION  2011    [place] BREAST AUGMENTATION WITH IMPLANT  2011     Social History     Tobacco Use    Smoking status: Never    Smokeless tobacco: Never   Vaping Use    Vaping Use: Never used   Substance Use Topics    Alcohol use: Not Currently     Comment: rarely    Drug use: Not Currently     Types: Marijuana, Oral     Comment:  a few times a month/occ     Social History     Social History Narrative    Not on file     Current Outpatient Medications Ordered in Epic   Medication Sig Dispense Refill    amphetamine-dextroamphetamine ER (ADDERALL XR) 30 MG XR capsule Take 1 Capsule by mouth every morning for 30  days. 30 Capsule 0    [START ON 5/11/2024] amphetamine-dextroamphetamine ER (ADDERALL XR) 30 MG XR capsule Take 1 Capsule by mouth every morning for 30 days. 30 Capsule 0    [START ON 6/11/2024] amphetamine-dextroamphetamine ER (ADDERALL XR) 30 MG XR capsule Take 1 Capsule by mouth every morning for 30 days. 30 Capsule 0    amphetamine-dextroamphetamine (ADDERALL) 10 MG Tab Take 1 Tablet by mouth every day for 30 days. 30 Tablet 0    [START ON 5/11/2024] amphetamine-dextroamphetamine (ADDERALL) 10 MG Tab Take 1 Tablet by mouth every day for 30 days. 30 Tablet 0    [START ON 6/11/2024] amphetamine-dextroamphetamine (ADDERALL) 10 MG Tab Take 1 Tablet by mouth every day for 30 days. 30 Tablet 0    montelukast (SINGULAIR) 10 MG Tab TAKE ONE TABLET BY MOUTH DAILY 90 Tablet 2    liothyronine (CYTOMEL) 5 MCG Tab Take 5 mcg by mouth.      SYNTHROID 150 MCG Tab TAKE ONE TABLET BY MOUTH EVERY MORNING ON AN EMPTY STOMACH 60 Tablet 0    Multiple Vitamin (MULTI-VITAMIN PO) Take  by mouth.      MAGNESIUM PO Take  by mouth.      Probiotic Product (PROBIOTIC PO) Take  by mouth.      albuterol 108 (90 Base) MCG/ACT Aero Soln inhalation aerosol Inhale 2 Puffs every 6 hours as needed for Shortness of Breath. 8.5 g 1    EPINEPHrine (EPIPEN) 0.3 MG/0.3ML Solution Auto-injector solution for injection Inject 0.3 mL into the thigh one time for 1 dose. 1 Each 0    fluconazole (DIFLUCAN) 150 MG tablet Take 1 Tablet by mouth every day. (Patient not taking: Reported on 6/15/2023) 3 Tablet 3    fluconazole (DIFLUCAN) 150 MG tablet       Homeopathic Products (ZICAM ALLERGY RELIEF NA) Administer  into affected nostril(S). (Patient not taking: Reported on 6/15/2023)      Cetirizine HCl (ZYRTEC ALLERGY PO) Take  by mouth.      fluticasone (FLOVENT HFA) 220 MCG/ACT Aerosol Inhale 1 Puff 2 times a day. (Patient not taking: Reported on 3/14/2023) 12 g 1    Spacer/Aero-Hold Chamber Bags Misc Dispense 1 spacer for albuterol MDI. (Patient not taking:  "Reported on 3/14/2023) 1 Each 0    Blood Glucose Meter Kit Test blood sugar as recommended by provider when near-syncopal for hypoglycemia. One Touch Verio blood glucose monitoring kit. (Patient not taking: Reported on 6/15/2023) 1 Kit 0    Blood Glucose Test Strips Use one One Touch Verio Flex strip to test blood sugar once daily . (Patient not taking: Reported on 11/16/2023) 30 Each 1    Lancets Use one One Touch Verio Flex lancet to test blood sugar once daily . (Patient not taking: Reported on 6/15/2023) 100 Each 0    Alcohol Swabs Wipe site with prep pad prior to injection. (Patient not taking: Reported on 1/17/2024) 100 Each 0    DIGESTIVE ENZYMES PO Take  by mouth.       No current Epic-ordered facility-administered medications on file.     Eggs, Shellfish allergy, Wheat bran, Ibuprofen, and Tree nuts food allergy    ROS: see hpi  Gen: no fevers/chills  Pulm: no sob, no cough  CV: no chest pain, no palpitations, no edema  GI: no nausea/vomiting, no diarrhea  Skin: no rash    Objective:   Exam:  /74 (BP Location: Left arm, Patient Position: Sitting, BP Cuff Size: Adult)   Pulse 80   Temp 36.4 °C (97.6 °F) (Temporal)   Resp 14   Ht 1.778 m (5' 10\")   Wt 73.5 kg (162 lb)   LMP 03/26/2024   SpO2 95%   BMI 23.24 kg/m²    Body mass index is 23.24 kg/m².    Gen: Alert and oriented, No apparent distress.  HEENT: Head atraumatic, normocephalic. Pupils equal and round.  Neck: Neck is supple without lymphadenopathy.   Lungs: Normal effort, CTA bilaterally, no wheezes, rhonchi, or rales  CV: Regular rate and rhythm. No murmurs, rubs, or gallops.  ABD: +BS. Non-tender, non-distended. No rebound, rigidity, or guarding.  Ext: No clubbing, cyanosis, edema.    Assessment & Plan:     39 y.o. female with the following -     1. Attention deficit hyperactivity disorder (ADHD), unspecified ADHD type  amphetamine-dextroamphetamine ER (ADDERALL XR) 30 MG XR capsule    amphetamine-dextroamphetamine ER (ADDERALL XR) 30 " MG XR capsule    amphetamine-dextroamphetamine ER (ADDERALL XR) 30 MG XR capsule    amphetamine-dextroamphetamine (ADDERALL) 10 MG Tab    amphetamine-dextroamphetamine (ADDERALL) 10 MG Tab    amphetamine-dextroamphetamine (ADDERALL) 10 MG Tab        ADHD well controlled on Adderall. Will resume previous regimen of:  Adderall 30 mg extended release in a.m. and Adderall 10 mg tablet in afternoon.  Med refill sent to pharmacy.   There has been scheduling issues with med refill and she was out of medication for almost a week.  Will try to send 3 month refill to prevent med lapse.  Discussed medication desired effects, potential side effects, and how to administer the medication.  Discussed nonpharmacological interventions such as structure, routine, adequate sleep.  Recommend patient to see a therapist as well.  Patient verbalized understanding regarding plan of care and all questions answered.    Medical Decision Making/Course:  In the course of preparing for this visit with review of the pertinent past medical history, recent and past clinic visits, current medications, and performing chart, immunization, medical history and medication reconciliation, and in the further course of obtaining the current history pertinent to the clinic visit today, performing an exam and evaluation, ordering and independently evaluating labs, tests, and/or procedures, prescribing any recommended new medications as noted above, providing any pertinent counseling and education and recommending further coordination of care. This was discussed with patient in a shared-decision making conversation, and they understand and agreed with plan of care.     Return in about 3 months (around 7/10/2024), or if symptoms worsen or fail to improve.    NOE Romero.   [facility]    Please note that this dictation was created using voice recognition software. I have made every reasonable attempt to correct obvious errors, but I  expect that there are errors of grammar and possibly content that I did not discover before finalizing the note.

## 2024-05-09 ENCOUNTER — TELEMEDICINE (OUTPATIENT)
Dept: MEDICAL GROUP | Facility: IMAGING CENTER | Age: 39
End: 2024-05-09
Payer: COMMERCIAL

## 2024-05-09 VITALS — TEMPERATURE: 98.5 F | HEART RATE: 65 BPM | BODY MASS INDEX: 22.62 KG/M2 | WEIGHT: 158 LBS | HEIGHT: 70 IN

## 2024-05-09 DIAGNOSIS — F41.9 ANXIETY AND DEPRESSION: ICD-10-CM

## 2024-05-09 DIAGNOSIS — F90.9 ATTENTION DEFICIT HYPERACTIVITY DISORDER (ADHD), UNSPECIFIED ADHD TYPE: ICD-10-CM

## 2024-05-09 DIAGNOSIS — R21 FACIAL RASH: ICD-10-CM

## 2024-05-09 DIAGNOSIS — G89.29 CHRONIC PAIN OF MULTIPLE JOINTS: ICD-10-CM

## 2024-05-09 DIAGNOSIS — F32.A ANXIETY AND DEPRESSION: ICD-10-CM

## 2024-05-09 DIAGNOSIS — G93.32 CHRONIC FATIGUE SYNDROME: ICD-10-CM

## 2024-05-09 DIAGNOSIS — M25.50 CHRONIC PAIN OF MULTIPLE JOINTS: ICD-10-CM

## 2024-05-09 PROCEDURE — 99214 OFFICE O/P EST MOD 30 MIN: CPT | Mod: 95

## 2024-05-09 RX ORDER — DEXTROAMPHETAMINE SACCHARATE, AMPHETAMINE ASPARTATE, DEXTROAMPHETAMINE SULFATE AND AMPHETAMINE SULFATE 2.5; 2.5; 2.5; 2.5 MG/1; MG/1; MG/1; MG/1
10 TABLET ORAL DAILY
Qty: 30 TABLET | Refills: 0 | Status: SHIPPED | OUTPATIENT
Start: 2024-05-09 | End: 2024-06-08

## 2024-05-09 RX ORDER — DEXTROAMPHETAMINE SACCHARATE, AMPHETAMINE ASPARTATE, DEXTROAMPHETAMINE SULFATE AND AMPHETAMINE SULFATE 2.5; 2.5; 2.5; 2.5 MG/1; MG/1; MG/1; MG/1
10 TABLET ORAL DAILY
Qty: 30 TABLET | Refills: 0 | Status: SHIPPED | OUTPATIENT
Start: 2024-06-08 | End: 2024-07-08

## 2024-05-09 RX ORDER — DEXTROAMPHETAMINE SACCHARATE, AMPHETAMINE ASPARTATE MONOHYDRATE, DEXTROAMPHETAMINE SULFATE AND AMPHETAMINE SULFATE 7.5; 7.5; 7.5; 7.5 MG/1; MG/1; MG/1; MG/1
30 CAPSULE, EXTENDED RELEASE ORAL EVERY MORNING
Qty: 30 CAPSULE | Refills: 0 | Status: SHIPPED | OUTPATIENT
Start: 2024-06-08 | End: 2024-07-08

## 2024-05-09 RX ORDER — DEXTROAMPHETAMINE SACCHARATE, AMPHETAMINE ASPARTATE MONOHYDRATE, DEXTROAMPHETAMINE SULFATE AND AMPHETAMINE SULFATE 7.5; 7.5; 7.5; 7.5 MG/1; MG/1; MG/1; MG/1
30 CAPSULE, EXTENDED RELEASE ORAL EVERY MORNING
Qty: 30 CAPSULE | Refills: 0 | Status: SHIPPED | OUTPATIENT
Start: 2024-05-09 | End: 2024-06-08

## 2024-05-09 ASSESSMENT — PAIN SCALES - GENERAL: PAINLEVEL: NO PAIN

## 2024-05-09 NOTE — PROGRESS NOTES
Virtual Visit: Established Patient   This visit was conducted via Zoom using secure and encrypted videoconferencing technology.   The patient was in their home in the state of [place].    The patient's identity was confirmed and verbal consent was obtained for this virtual visit.    Subjective:   CC:   Chief Complaint   Patient presents with    Follow-Up     Pt states endo recommended additional testing for lupus     Medication Management     Adderall      Juanita Montero is a 39 y.o. female presenting for evaluation and management of:    Patient reports intermittent episodes of facial rash for years. Her symptoms would wax and wane. She admits that stress provokes her symptoms.   She assumed it was eczema; however, after discussing this with her endocrinologist along with her chronic symptoms of fatigue, multiple joint pain, anxiety, and headaches.   She was advised to get autoimmune testing.     ROS per hpi  Gen: no fevers/chills  Pulm: no sob, no cough  CV: no chest pain, no palpitations, no edema  GI: no nausea/vomiting, no diarrhea  Skin: no rash    Current medicines (including changes today)  Current Outpatient Medications   Medication Sig Dispense Refill    amphetamine-dextroamphetamine (ADDERALL) 10 MG Tab Take 1 Tablet by mouth every day for 30 days. 30 Tablet 0    amphetamine-dextroamphetamine ER (ADDERALL XR) 30 MG XR capsule Take 1 Capsule by mouth every morning for 30 days. 30 Capsule 0    [START ON 6/8/2024] amphetamine-dextroamphetamine (ADDERALL) 10 MG Tab Take 1 Tablet by mouth every day for 30 days. 30 Tablet 0    [START ON 6/8/2024] amphetamine-dextroamphetamine ER (ADDERALL XR) 30 MG XR capsule Take 1 Capsule by mouth every morning for 30 days. 30 Capsule 0    amphetamine-dextroamphetamine ER (ADDERALL XR) 30 MG XR capsule Take 1 Capsule by mouth every morning for 30 days. 30 Capsule 0    amphetamine-dextroamphetamine (ADDERALL) 10 MG Tab Take 1 Tablet by mouth every day for 30 days. 30  Tablet 0    montelukast (SINGULAIR) 10 MG Tab TAKE ONE TABLET BY MOUTH DAILY 90 Tablet 2    albuterol 108 (90 Base) MCG/ACT Aero Soln inhalation aerosol Inhale 2 Puffs every 6 hours as needed for Shortness of Breath. 8.5 g 1    liothyronine (CYTOMEL) 5 MCG Tab Take 5 mcg by mouth.      EPINEPHrine (EPIPEN) 0.3 MG/0.3ML Solution Auto-injector solution for injection Inject 0.3 mL into the thigh one time for 1 dose. 1 Each 0    fluconazole (DIFLUCAN) 150 MG tablet Take 1 Tablet by mouth every day. 3 Tablet 3    SYNTHROID 150 MCG Tab TAKE ONE TABLET BY MOUTH EVERY MORNING ON AN EMPTY STOMACH (Patient taking differently: Take 150 mcg by mouth every morning on an empty stomach. 170 MCG twice a day and then 150MCG the rest of the time) 60 Tablet 0    Multiple Vitamin (MULTI-VITAMIN PO) Take  by mouth.      Cetirizine HCl (ZYRTEC ALLERGY PO) Take  by mouth.      fluticasone (FLOVENT HFA) 220 MCG/ACT Aerosol Inhale 1 Puff 2 times a day. 12 g 1    Spacer/Aero-Hold Chamber Bags Misc Dispense 1 spacer for albuterol MDI. 1 Each 0    Blood Glucose Meter Kit Test blood sugar as recommended by provider when near-syncopal for hypoglycemia. One Touch Verio blood glucose monitoring kit. 1 Kit 0    Blood Glucose Test Strips Use one One Touch Verio Flex strip to test blood sugar once daily . 30 Each 1    Lancets Use one One Touch Verio Flex lancet to test blood sugar once daily . 100 Each 0    Alcohol Swabs Wipe site with prep pad prior to injection. 100 Each 0    MAGNESIUM PO Take  by mouth.      DIGESTIVE ENZYMES PO Take  by mouth.      Probiotic Product (PROBIOTIC PO) Take  by mouth.      fluconazole (DIFLUCAN) 150 MG tablet        No current facility-administered medications for this visit.       Patient Active Problem List    Diagnosis Date Noted    Autoimmune thyroiditis 01/18/2023    Hypothyroidism 01/18/2023    Yeast infection 10/24/2022    Mild intermittent asthma without complication 06/22/2020    Anxiety 12/05/2019     "Acquired hypothyroidism 08/29/2016    Encounter for initial prescription of contraceptive pills 07/13/2016    Attention deficit hyperactivity disorder 07/13/2016        Objective:   Pulse 65 Comment: Pt reported  Temp 36.9 °C (98.5 °F) (Temporal) Comment: Pt reported Comment (Src): Pt reported  Ht 1.778 m (5' 10\") Comment: Pt reported  Wt 71.7 kg (158 lb) Comment: Pt reported  LMP 04/23/2024 (Approximate)   BMI 22.67 kg/m²     Physical Exam:  Constitutional: Alert, no distress, well-groomed.  Skin: No rashes in visible areas.  Eye: Round. Conjunctiva clear, lids normal. No icterus.   ENMT: Lips pink without lesions, good dentition, moist mucous membranes. Phonation normal.  Neck: No masses, no thyromegaly. Moves freely without pain.  Respiratory: Unlabored respiratory effort, no cough or audible wheeze  Psych: Alert and oriented x3, normal affect and mood.     Assessment and Plan:   The following treatment plan was discussed:     1. Facial rash  Chronic condition with intermittent flare ups. Currently asymptomatic.   DD: eczema versus lupus.  Will order labs to rule out autoimmune disorder.  If labs are negative, will defer to dermatology.    - STEWART REFLEXIVE PROFILE; Future    2. Chronic pain of multiple joints  3. Chronic fatigue syndrome  Chronic and ongoing condition. Etiology unknown. Will order labs to r/o autoimmune disorder vs. Genetic disorder    - STEWART REFLEXIVE PROFILE; Future  - RHEUMATOID ARTHRITIS FACTOR; Future  - Sed Rate; Future  - CRP QUANTITIVE (NON-CARDIAC); Future  - MTHFR    4. Anxiety and depression  Hypothyroidism vs MTHFR?  F/u with endocrinology.   Will order labs to r/o MTHFR    - MTHFR    5. Attention deficit hyperactivity disorder (ADHD), unspecified ADHD type  ADHD controlled with Adderall 30 mg XR and adderall 10 mg daily dose.  Med refill sent to pharmacy.  Medication administration and side effects discussed.  Obtained and reviewed patient utilization report from [facility] " pharmacy database on 5/9/2024 4:40 PM  prior to writing prescription for controlled substance II, III or IV per [place] bill . Based on assessment of the report, the prescription is medically necessary.     - amphetamine-dextroamphetamine (ADDERALL) 10 MG Tab; Take 1 Tablet by mouth every day for 30 days.  Dispense: 30 Tablet; Refill: 0  - amphetamine-dextroamphetamine ER (ADDERALL XR) 30 MG XR capsule; Take 1 Capsule by mouth every morning for 30 days.  Dispense: 30 Capsule; Refill: 0  - amphetamine-dextroamphetamine (ADDERALL) 10 MG Tab; Take 1 Tablet by mouth every day for 30 days.  Dispense: 30 Tablet; Refill: 0  - amphetamine-dextroamphetamine ER (ADDERALL XR) 30 MG XR capsule; Take 1 Capsule by mouth every morning for 30 days.  Dispense: 30 Capsule; Refill: 0    Follow-up: Return if symptoms worsen or fail to improve.    Thank you, Bisi ALICIA  [facility]

## 2024-05-18 LAB
25(OH)D3+25(OH)D2 SERPL-MCNC: 31.1 NG/ML (ref 30–100)
ALBUMIN SERPL-MCNC: 4.2 G/DL (ref 3.9–4.9)
ALBUMIN/GLOB SERPL: 1.7 {RATIO} (ref 1.2–2.2)
ALP SERPL-CCNC: 65 IU/L (ref 44–121)
ALT SERPL-CCNC: 25 IU/L (ref 0–32)
ANA SER QL IF: NEGATIVE
AST SERPL-CCNC: 25 IU/L (ref 0–40)
BASOPHILS # BLD AUTO: 0 X10E3/UL (ref 0–0.2)
BASOPHILS NFR BLD AUTO: 0 %
BILIRUB SERPL-MCNC: 0.3 MG/DL (ref 0–1.2)
BUN SERPL-MCNC: 18 MG/DL (ref 6–24)
BUN/CREAT SERPL: 21 (ref 9–23)
CALCIUM SERPL-MCNC: 9.6 MG/DL (ref 8.7–10.2)
CCP IGA+IGG SERPL IA-ACNC: 6 UNITS (ref 0–19)
CHLORIDE SERPL-SCNC: 104 MMOL/L (ref 96–106)
CHOLEST SERPL-MCNC: 157 MG/DL (ref 100–199)
CO2 SERPL-SCNC: 24 MMOL/L (ref 20–29)
CREAT SERPL-MCNC: 0.87 MG/DL (ref 0.57–1)
CRP SERPL-MCNC: 1 MG/L (ref 0–10)
EGFRCR SERPLBLD CKD-EPI 2021: 86 ML/MIN/1.73
EOSINOPHIL # BLD AUTO: 0.1 X10E3/UL (ref 0–0.4)
EOSINOPHIL NFR BLD AUTO: 1 %
ERYTHROCYTE [DISTWIDTH] IN BLOOD BY AUTOMATED COUNT: 11.7 % (ref 11.7–15.4)
ERYTHROCYTE [SEDIMENTATION RATE] IN BLOOD BY WESTERGREN METHOD: 3 MM/HR (ref 0–32)
GLOBULIN SER CALC-MCNC: 2.5 G/DL (ref 1.5–4.5)
GLUCOSE SERPL-MCNC: 82 MG/DL (ref 70–99)
HBA1C MFR BLD: 5.1 % (ref 4.8–5.6)
HCT VFR BLD AUTO: 45.6 % (ref 34–46.6)
HDLC SERPL-MCNC: 89 MG/DL
HGB BLD-MCNC: 15.3 G/DL (ref 11.1–15.9)
IMM GRANULOCYTES # BLD AUTO: 0 X10E3/UL (ref 0–0.1)
IMM GRANULOCYTES NFR BLD AUTO: 0 %
IMMATURE CELLS  115398: NORMAL
LABORATORY COMMENT REPORT: NORMAL
LABORATORY COMMENT REPORT: NORMAL
LDLC SERPL CALC-MCNC: 56 MG/DL (ref 0–99)
LYMPHOCYTES # BLD AUTO: 2 X10E3/UL (ref 0.7–3.1)
LYMPHOCYTES NFR BLD AUTO: 35 %
MCH RBC QN AUTO: 31.1 PG (ref 26.6–33)
MCHC RBC AUTO-ENTMCNC: 33.6 G/DL (ref 31.5–35.7)
MCV RBC AUTO: 93 FL (ref 79–97)
MONOCYTES # BLD AUTO: 0.4 X10E3/UL (ref 0.1–0.9)
MONOCYTES NFR BLD AUTO: 7 %
MORPHOLOGY BLD-IMP: NORMAL
NEUTROPHILS # BLD AUTO: 3.2 X10E3/UL (ref 1.4–7)
NEUTROPHILS NFR BLD AUTO: 57 %
NRBC BLD AUTO-RTO: NORMAL %
PLATELET # BLD AUTO: 303 X10E3/UL (ref 150–450)
POTASSIUM SERPL-SCNC: 5 MMOL/L (ref 3.5–5.2)
PROT SERPL-MCNC: 6.7 G/DL (ref 6–8.5)
RBC # BLD AUTO: 4.92 X10E6/UL (ref 3.77–5.28)
RHEUMATOID FACT SERPL-ACNC: <10 IU/ML
SODIUM SERPL-SCNC: 139 MMOL/L (ref 134–144)
TRIGL SERPL-MCNC: 61 MG/DL (ref 0–149)
VLDLC SERPL CALC-MCNC: 12 MG/DL (ref 5–40)
WBC # BLD AUTO: 5.7 X10E3/UL (ref 3.4–10.8)

## 2024-05-20 DIAGNOSIS — F90.9 ATTENTION DEFICIT HYPERACTIVITY DISORDER (ADHD), UNSPECIFIED ADHD TYPE: ICD-10-CM

## 2024-05-20 DIAGNOSIS — Z00.00 WELLNESS EXAMINATION: ICD-10-CM

## 2024-05-20 LAB
ALBUMIN SERPL BCP-MCNC: 4.2 G/DL
ALBUMIN/GLOB SERPL: 1.7 {RATIO}
ALP SERPL-CCNC: 65 U/L
ALT SERPL-CCNC: 25 U/L
ANION GAP SERPL CALC-SCNC: NORMAL MMOL/L
AST SERPL-CCNC: 25 U/L
BILIRUB SERPL-MCNC: 0.3 MG/DL
BUN SERPL-MCNC: 18 MG/DL
CALCIUM SERPL-MCNC: 9.6 MG/DL
CHLORIDE SERPL-SCNC: 104 MMOL/L
CO2 SERPL-SCNC: 24 MMOL/L
CREAT SERPL-MCNC: 0.87 MG/DL
GLOBULIN SER CALC-MCNC: 2.5 G/L
GLUCOSE SERPL-MCNC: 82 MG/DL
POTASSIUM SERPL-SCNC: 5 MMOL/L
PROT SERPL-MCNC: 6.7 G/DL
SODIUM SERPL-SCNC: 139 MMOL/L

## 2024-05-21 DIAGNOSIS — M25.50 MULTIPLE JOINT PAIN: ICD-10-CM

## 2024-05-21 DIAGNOSIS — R53.82 CHRONIC FATIGUE: ICD-10-CM

## 2024-05-21 DIAGNOSIS — F32.A ANXIETY AND DEPRESSION: ICD-10-CM

## 2024-05-21 DIAGNOSIS — F41.9 ANXIETY AND DEPRESSION: ICD-10-CM

## 2024-05-24 DIAGNOSIS — F41.9 ANXIETY AND DEPRESSION: ICD-10-CM

## 2024-05-24 DIAGNOSIS — R53.82 CHRONIC FATIGUE: ICD-10-CM

## 2024-05-24 DIAGNOSIS — F32.A ANXIETY AND DEPRESSION: ICD-10-CM

## 2024-05-24 DIAGNOSIS — M25.50 MULTIPLE JOINT PAIN: ICD-10-CM

## 2024-05-30 DIAGNOSIS — J45.20 MILD INTERMITTENT ASTHMA WITHOUT COMPLICATION: ICD-10-CM

## 2024-05-30 RX ORDER — ALBUTEROL SULFATE 90 UG/1
2 AEROSOL, METERED RESPIRATORY (INHALATION) EVERY 6 HOURS PRN
Qty: 8.5 EACH | Refills: 1 | Status: SHIPPED | OUTPATIENT
Start: 2024-05-30

## 2024-05-30 NOTE — TELEPHONE ENCOUNTER
Received request via: Pharmacy    Was the patient seen in the last year in this department? Yes    Does the patient have an active prescription (recently filled or refills available) for medication(s) requested? No    Pharmacy Name: [id number]    Does the patient have FPC Plus and need 100 day supply (blood pressure, diabetes and cholesterol meds only)? Patient does not have SCP

## 2024-06-05 ENCOUNTER — APPOINTMENT (OUTPATIENT)
Dept: MEDICAL GROUP | Facility: IMAGING CENTER | Age: 39
End: 2024-06-05
Payer: COMMERCIAL

## 2024-06-06 ENCOUNTER — APPOINTMENT (OUTPATIENT)
Dept: MEDICAL GROUP | Facility: IMAGING CENTER | Age: 39
End: 2024-06-06
Payer: COMMERCIAL

## 2024-06-06 VITALS
TEMPERATURE: 98.4 F | OXYGEN SATURATION: 96 % | HEART RATE: 68 BPM | HEIGHT: 70 IN | DIASTOLIC BLOOD PRESSURE: 70 MMHG | SYSTOLIC BLOOD PRESSURE: 104 MMHG | RESPIRATION RATE: 14 BRPM | WEIGHT: 158.8 LBS | BODY MASS INDEX: 22.73 KG/M2

## 2024-06-06 DIAGNOSIS — F90.9 ATTENTION DEFICIT HYPERACTIVITY DISORDER (ADHD), UNSPECIFIED ADHD TYPE: ICD-10-CM

## 2024-06-06 PROCEDURE — 3078F DIAST BP <80 MM HG: CPT

## 2024-06-06 PROCEDURE — 3074F SYST BP LT 130 MM HG: CPT

## 2024-06-06 PROCEDURE — 99213 OFFICE O/P EST LOW 20 MIN: CPT

## 2024-06-06 RX ORDER — DEXTROAMPHETAMINE SACCHARATE, AMPHETAMINE ASPARTATE MONOHYDRATE, DEXTROAMPHETAMINE SULFATE AND AMPHETAMINE SULFATE 7.5; 7.5; 7.5; 7.5 MG/1; MG/1; MG/1; MG/1
30 CAPSULE, EXTENDED RELEASE ORAL EVERY MORNING
Qty: 90 CAPSULE | Refills: 0 | Status: SHIPPED | OUTPATIENT
Start: 2024-06-06 | End: 2024-09-04

## 2024-06-06 RX ORDER — DEXTROAMPHETAMINE SACCHARATE, AMPHETAMINE ASPARTATE, DEXTROAMPHETAMINE SULFATE AND AMPHETAMINE SULFATE 2.5; 2.5; 2.5; 2.5 MG/1; MG/1; MG/1; MG/1
10 TABLET ORAL DAILY
Qty: 90 TABLET | Refills: 0 | Status: SHIPPED | OUTPATIENT
Start: 2024-06-06 | End: 2024-09-04

## 2024-06-06 ASSESSMENT — FIBROSIS 4 INDEX: FIB4 SCORE: 0.68

## 2024-06-06 NOTE — PROGRESS NOTES
Subjective:     CC:   Chief Complaint   Patient presents with    Follow-Up     Medication for adderall       HPI:   Juanita presents today to discuss:    Attention deficit hyperactivity disorder (ADHD), unspecified ADHD type  Chronic condition. Patient on Adderall 30 mg XR in am and Adderral 10 mg tab in afternoon.   She is tolerating medications well without side effects.  She is requesting med refill.  Pt admits having delays with monthly refills.   Patient denies symptoms of aggression, impulsivity, irritability, hyperactivity, difficulty with concentration.          Past Medical History:   Diagnosis Date    ADHD     Allergy     Anxiety     Depression     Hashimoto's disease     Hyperthyroidism     Restless leg syndrome      Family History   Problem Relation Age of Onset    Osteoporosis Mother     No Known Problems Father      Past Surgical History:   Procedure Laterality Date    MAMMOPLASTY AUGMENTATION  2011    [place] BREAST AUGMENTATION WITH IMPLANT  2011     Social History     Tobacco Use    Smoking status: Never    Smokeless tobacco: Never   Vaping Use    Vaping status: Never Used   Substance Use Topics    Alcohol use: Not Currently     Comment: rarely    Drug use: Not Currently     Types: Marijuana, Oral     Comment:  a few times a month/occ     Social History     Social History Narrative    Not on file     Current Outpatient Medications Ordered in Epic   Medication Sig Dispense Refill    amphetamine-dextroamphetamine ER (ADDERALL XR) 30 MG XR capsule Take 1 Capsule by mouth every morning for 90 days. 90 Capsule 0    amphetamine-dextroamphetamine (ADDERALL) 10 MG Tab Take 1 Tablet by mouth every day for 90 days. 90 Tablet 0    albuterol 108 (90 Base) MCG/ACT Aero Soln inhalation aerosol INHALE 2 PUFFS BY MOUTH EVERY 6 HOURS AS NEEDED FOR SHORTNESS OF BREATH 8.5 Each 1    montelukast (SINGULAIR) 10 MG Tab TAKE ONE TABLET BY MOUTH DAILY 90 Tablet 2    liothyronine (CYTOMEL) 5 MCG Tab Take 5 mcg by mouth.       "EPINEPHrine (EPIPEN) 0.3 MG/0.3ML Solution Auto-injector solution for injection Inject 0.3 mL into the thigh one time for 1 dose. 1 Each 0    fluconazole (DIFLUCAN) 150 MG tablet Take 1 Tablet by mouth every day. 3 Tablet 3    SYNTHROID 150 MCG Tab TAKE ONE TABLET BY MOUTH EVERY MORNING ON AN EMPTY STOMACH (Patient taking differently: Take 150 mcg by mouth every morning on an empty stomach. 175 twice a week and then 150MCG the rest of the time) 60 Tablet 0    Multiple Vitamin (MULTI-VITAMIN PO) Take  by mouth.      Cetirizine HCl (ZYRTEC ALLERGY PO) Take  by mouth.      Spacer/Aero-Hold Chamber Bags Misc Dispense 1 spacer for albuterol MDI. 1 Each 0    Blood Glucose Meter Kit Test blood sugar as recommended by provider when near-syncopal for hypoglycemia. One Touch Verio blood glucose monitoring kit. 1 Kit 0    Blood Glucose Test Strips Use one One Touch Verio Flex strip to test blood sugar once daily . 30 Each 1    Lancets Use one One Touch Verio Flex lancet to test blood sugar once daily . 100 Each 0    Alcohol Swabs Wipe site with prep pad prior to injection. 100 Each 0    MAGNESIUM PO Take  by mouth.      DIGESTIVE ENZYMES PO Take  by mouth.      Probiotic Product (PROBIOTIC PO) Take  by mouth.      fluconazole (DIFLUCAN) 150 MG tablet       fluticasone (FLOVENT HFA) 220 MCG/ACT Aerosol Inhale 1 Puff 2 times a day. (Patient not taking: Reported on 6/6/2024) 12 g 1     No current [facility]-ordered facility-administered medications on file.     Eggs, Shellfish allergy, Wheat bran, Ibuprofen, and Tree nuts food allergy    ROS: see hpi  Gen: no fevers/chills  Pulm: no sob, no cough  CV: no chest pain, no palpitations, no edema  GI: no nausea/vomiting, no diarrhea  Skin: no rash    Objective:   Exam:  /70 (BP Location: Left arm, Patient Position: Sitting, BP Cuff Size: Adult)   Pulse 68   Temp 36.9 °C (98.4 °F) (Temporal)   Resp 14   Ht 1.778 m (5' 10\")   Wt 72 kg (158 lb 12.8 oz)   LMP 05/23/2024 " (Approximate)   SpO2 96%   BMI 22.79 kg/m²    Body mass index is 22.79 kg/m².    Gen: Alert and oriented, No apparent distress.  HEENT: Head atraumatic, normocephalic. Pupils equal and round.  Neck: Neck is supple without lymphadenopathy.   Lungs: Normal effort, CTA bilaterally, no wheezes, rhonchi, or rales  CV: Regular rate and rhythm. No murmurs, rubs, or gallops.  ABD: +BS. Non-tender, non-distended. No rebound, rigidity, or guarding.  Ext: No clubbing, cyanosis, edema.    Assessment & Plan:     39 y.o. female with the following -     1. Attention deficit hyperactivity disorder (ADHD), unspecified ADHD type  Chronic and controlled condition on current regimen.  Will send 3-month refill to prevent delays on refills.   Medication administration and side effects discussed.  Obtained and reviewed patient utilization report from [facility] pharmacy database on 6/6/2024 2:47 PM  prior to writing prescription for controlled substance II, III or IV per [place] bill . Based on assessment of the report, the prescription is medically necessary.   Will f/u in 3 months    - amphetamine-dextroamphetamine ER (ADDERALL XR) 30 MG XR capsule; Take 1 Capsule by mouth every morning for 90 days.  Dispense: 90 Capsule; Refill: 0  - amphetamine-dextroamphetamine (ADDERALL) 10 MG Tab; Take 1 Tablet by mouth every day for 90 days.  Dispense: 90 Tablet; Refill: 0    Return in about 3 months (around 9/6/2024), or if symptoms worsen or fail to improve.    NOE Romero.   [facility]    Please note that this dictation was created using voice recognition software. I have made every reasonable attempt to correct obvious errors, but I expect that there are errors of grammar and possibly content that I did not discover before finalizing the note.

## 2024-08-20 ENCOUNTER — OFFICE VISIT (OUTPATIENT)
Dept: MEDICAL GROUP | Facility: IMAGING CENTER | Age: 39
End: 2024-08-20
Payer: COMMERCIAL

## 2024-08-20 VITALS
RESPIRATION RATE: 16 BRPM | HEIGHT: 69 IN | WEIGHT: 157.2 LBS | TEMPERATURE: 99.5 F | BODY MASS INDEX: 23.28 KG/M2 | OXYGEN SATURATION: 96 % | HEART RATE: 88 BPM | SYSTOLIC BLOOD PRESSURE: 102 MMHG | DIASTOLIC BLOOD PRESSURE: 70 MMHG

## 2024-08-20 DIAGNOSIS — F90.9 ATTENTION DEFICIT HYPERACTIVITY DISORDER (ADHD), UNSPECIFIED ADHD TYPE: ICD-10-CM

## 2024-08-20 PROCEDURE — 3078F DIAST BP <80 MM HG: CPT

## 2024-08-20 PROCEDURE — 99213 OFFICE O/P EST LOW 20 MIN: CPT

## 2024-08-20 PROCEDURE — 3074F SYST BP LT 130 MM HG: CPT

## 2024-08-20 RX ORDER — DEXTROAMPHETAMINE SACCHARATE, AMPHETAMINE ASPARTATE MONOHYDRATE, DEXTROAMPHETAMINE SULFATE AND AMPHETAMINE SULFATE 7.5; 7.5; 7.5; 7.5 MG/1; MG/1; MG/1; MG/1
30 CAPSULE, EXTENDED RELEASE ORAL EVERY MORNING
Qty: 90 CAPSULE | Refills: 0 | Status: SHIPPED | OUTPATIENT
Start: 2024-09-01 | End: 2024-11-30

## 2024-08-20 RX ORDER — DEXTROAMPHETAMINE SACCHARATE, AMPHETAMINE ASPARTATE, DEXTROAMPHETAMINE SULFATE AND AMPHETAMINE SULFATE 2.5; 2.5; 2.5; 2.5 MG/1; MG/1; MG/1; MG/1
10 TABLET ORAL DAILY
Qty: 90 TABLET | Refills: 0 | Status: SHIPPED | OUTPATIENT
Start: 2024-09-01 | End: 2024-11-30

## 2024-08-20 ASSESSMENT — FIBROSIS 4 INDEX: FIB4 SCORE: 0.68

## 2024-08-20 NOTE — PROGRESS NOTES
Subjective:     CC:   Chief Complaint   Patient presents with    Follow-Up     Medication for the adderall        HPI:   Juanita presents today to discuss:    Attention deficit hyperactivity disorder (ADHD), unspecified ADHD type  Chronic condition. Patient on Adderall 30 mg XR in am and Adderral 10 mg tab in afternoon.   She is tolerating medications well without side effects.  She is requesting med refill.  Patient denies symptoms of aggression, impulsivity, irritability, hyperactivity, difficulty with concentration.         Past Medical History:   Diagnosis Date    ADHD     Allergy     Anxiety     Depression     Hashimoto's disease     Hyperthyroidism     Restless leg syndrome      Family History   Problem Relation Age of Onset    Osteoporosis Mother     No Known Problems Father      Past Surgical History:   Procedure Laterality Date    MAMMOPLASTY AUGMENTATION  2011    [place] BREAST AUGMENTATION WITH IMPLANT  2011     Social History     Tobacco Use    Smoking status: Never    Smokeless tobacco: Never   Vaping Use    Vaping status: Never Used   Substance Use Topics    Alcohol use: Yes     Comment: rarely    Drug use: Not Currently     Types: Marijuana, Oral     Comment:  a few times a month/occ     Social History     Social History Narrative    Not on file     Current Outpatient Medications Ordered in Epic   Medication Sig Dispense Refill    [START ON 9/1/2024] amphetamine-dextroamphetamine ER (ADDERALL XR) 30 MG XR capsule Take 1 Capsule by mouth every morning for 90 days. 90 Capsule 0    [START ON 9/1/2024] amphetamine-dextroamphetamine (ADDERALL) 10 MG Tab Take 1 Tablet by mouth every day for 90 days. 90 Tablet 0    fluconazole (DIFLUCAN) 150 MG tablet One tablet by mouth for 1 dose, may repeat in 3 days for persistent symptoms 2 Tablet 0    albuterol 108 (90 Base) MCG/ACT Aero Soln inhalation aerosol INHALE 2 PUFFS BY MOUTH EVERY 6 HOURS AS NEEDED FOR SHORTNESS OF BREATH 8.5 Each 1    montelukast (SINGULAIR) 10 MG  Tab TAKE ONE TABLET BY MOUTH DAILY 90 Tablet 2    liothyronine (CYTOMEL) 5 MCG Tab Take 5 mcg by mouth.      EPINEPHrine (EPIPEN) 0.3 MG/0.3ML Solution Auto-injector solution for injection Inject 0.3 mL into the thigh one time for 1 dose. 1 Each 0    Multiple Vitamin (MULTI-VITAMIN PO) Take  by mouth.      Cetirizine HCl (ZYRTEC ALLERGY PO) Take  by mouth.      Spacer/Aero-Hold Chamber Bags Misc Dispense 1 spacer for albuterol MDI. 1 Each 0    Blood Glucose Meter Kit Test blood sugar as recommended by provider when near-syncopal for hypoglycemia. One Touch Verio blood glucose monitoring kit. 1 Kit 0    Blood Glucose Test Strips Use one One Touch Verio Flex strip to test blood sugar once daily . 30 Each 1    Alcohol Swabs Wipe site with prep pad prior to injection. 100 Each 0    MAGNESIUM PO Take  by mouth.      DIGESTIVE ENZYMES PO Take  by mouth.      Probiotic Product (PROBIOTIC PO) Take  by mouth.      fluconazole (DIFLUCAN) 150 MG tablet Take 1 Tablet by mouth every day. (Patient not taking: Reported on 8/20/2024) 3 Tablet 3    fluconazole (DIFLUCAN) 150 MG tablet       SYNTHROID 150 MCG Tab TAKE ONE TABLET BY MOUTH EVERY MORNING ON AN EMPTY STOMACH (Patient taking differently: Take 150 mcg by mouth every morning on an empty stomach. 175 twice a week and then 150MCG the rest of the time) 60 Tablet 0    fluticasone (FLOVENT HFA) 220 MCG/ACT Aerosol Inhale 1 Puff 2 times a day. (Patient not taking: Reported on 6/6/2024) 12 g 1    Lancets Use one One Touch Verio Flex lancet to test blood sugar once daily . (Patient not taking: Reported on 8/20/2024) 100 Each 0     No current [facility]-ordered facility-administered medications on file.     Eggs, Shellfish allergy, Wheat bran, Ibuprofen, and Tree nuts food allergy    ROS: see hpi  Gen: no fevers/chills  Pulm: no sob, no cough  CV: no chest pain, no palpitations, no edema  GI: no nausea/vomiting, no diarrhea  Skin: no rash    Objective:   Exam:  /70 (BP Location:  "Left arm, Patient Position: Sitting, BP Cuff Size: Adult)   Pulse 88   Temp 37.5 °C (99.5 °F) (Temporal)   Resp 16   Ht 1.753 m (5' 9\")   Wt 71.3 kg (157 lb 3.2 oz)   LMP 07/15/2024   SpO2 96%   BMI 23.21 kg/m²    Body mass index is 23.21 kg/m².    Gen: Alert and oriented, No apparent distress.  HEENT: Head atraumatic, normocephalic. Pupils equal and round.  Neck: Neck is supple without lymphadenopathy.   Lungs: Normal effort, CTA bilaterally, no wheezes, rhonchi, or rales  CV: Regular rate and rhythm. No murmurs, rubs, or gallops.  ABD: +BS. Non-tender, non-distended. No rebound, rigidity, or guarding.  Ext: No clubbing, cyanosis, edema.    Assessment & Plan:     39 y.o. female with the following -     1. Attention deficit hyperactivity disorder (ADHD), unspecified ADHD type  ADHD well controlled.  Refill sent to pharmacy.  Discussed medication desired effects, potential side effects, and how to administer the medication.  Discussed nonpharmacological interventions such as structure, routine, adequate sleep.  Recommend patient to see a therapist as well.  Patient verbalized understanding regarding plan of care and all questions answered.   Obtained and reviewed patient utilization report from [facility] pharmacy database on 8/20/2024 12:57 PM  prior to writing prescription for controlled substance II, III or IV per [place] bill . Based on assessment of the report, the prescription is medically necessary.     - amphetamine-dextroamphetamine ER (ADDERALL XR) 30 MG XR capsule; Take 1 Capsule by mouth every morning for 90 days.  Dispense: 90 Capsule; Refill: 0  - amphetamine-dextroamphetamine (ADDERALL) 10 MG Tab; Take 1 Tablet by mouth every day for 90 days.  Dispense: 90 Tablet; Refill: 0    Medical Decision Making/Course:  In the course of preparing for this visit with review of the pertinent past medical history, recent and past clinic visits, current medications, and performing chart, immunization, " medical history and medication reconciliation, and in the further course of obtaining the current history pertinent to the clinic visit today, performing an exam and evaluation, ordering and independently evaluating labs, tests, and/or procedures, prescribing any recommended new medications as noted above, providing any pertinent counseling and education and recommending further coordination of care. This was discussed with patient in a shared-decision making conversation, and they understand and agreed with plan of care.     Return in about 3 months (around 11/20/2024), or if symptoms worsen or fail to improve, for med check.    ADI Romero   [facility]    Please note that this dictation was created using voice recognition software. I have made every reasonable attempt to correct obvious errors, but I expect that there are errors of grammar and possibly content that I did not discover before finalizing the note.

## 2024-08-20 NOTE — LETTER
[facility]  SARINA RomeroP.R.N.  [address]   [place] [address]  Fax: [phone]   Authorization for Release/Disclosure of   Protected Health Information   Name: ROSALIND GARZA : 1985 SSN: [id number]   Address: [address] [place] [address]  [place] [address] Phone:    [phone] (home)    I authorize the entity listed below to release/disclose the PHI below to:   [facility]/Bisi Parker A.P.R.N. and JEROME Romero.P.R.SADIE.   Provider or Entity Name:     Address   City, State, Zip   Phone:      Fax:     Reason for request: continuity of care   Information to be released:    [  ] LAST COLONOSCOPY,  including any PATH REPORT and follow-up  [  ] LAST FIT/COLOGUARD RESULT [  ] LAST DEXA  [  ] LAST MAMMOGRAM  [  ] LAST PAP  [  ] LAST LABS [  ] RETINA EXAM REPORT  [  ] IMMUNIZATION RECORDS  [  ] Release all info      [  ] Check here and initial the line next to each item to release ALL health information INCLUDING  _____ Care and treatment for drug and / or alcohol abuse  _____ HIV testing, infection status, or AIDS  _____ Genetic Testing    DATES OF SERVICE OR TIME PERIOD TO BE DISCLOSED: _____________  I understand and acknowledge that:  * This Authorization may be revoked at any time by you in writing, except if your health information has already been used or disclosed.  * Your health information that will be used or disclosed as a result of you signing this authorization could be re-disclosed by the recipient. If this occurs, your re-disclosed health information may no longer be protected by State or Federal laws.  * You may refuse to sign this Authorization. Your refusal will not affect your ability to obtain treatment.  * This Authorization becomes effective upon signing and will  on (date) __________.      If no date is indicated, this Authorization will  one (1) year from the signature date.    Name: Rosalind Garza  Signature: Date:   2024     PLEASE FAX  REQUESTED RECORDS BACK TO: [phone]

## 2024-09-02 ENCOUNTER — PATIENT MESSAGE (OUTPATIENT)
Dept: MEDICAL GROUP | Facility: IMAGING CENTER | Age: 39
End: 2024-09-02
Payer: COMMERCIAL

## 2024-09-02 DIAGNOSIS — F90.9 ATTENTION DEFICIT HYPERACTIVITY DISORDER (ADHD), UNSPECIFIED ADHD TYPE: ICD-10-CM

## 2024-09-03 NOTE — PATIENT COMMUNICATION
Received request via: Patient    Was the patient seen in the last year in this department? Yes    Does the patient have an active prescription (recently filled or refills available) for medication(s) requested? No    Pharmacy Name: [id number]    Does the patient have snf Plus and need 100-day supply? (This applies to ALL medications) Patient does not have SCP

## 2024-09-04 RX ORDER — DEXTROAMPHETAMINE SACCHARATE, AMPHETAMINE ASPARTATE, DEXTROAMPHETAMINE SULFATE AND AMPHETAMINE SULFATE 2.5; 2.5; 2.5; 2.5 MG/1; MG/1; MG/1; MG/1
10 TABLET ORAL DAILY
Qty: 90 TABLET | Refills: 0 | Status: SHIPPED | OUTPATIENT
Start: 2024-09-04 | End: 2024-12-03

## 2024-09-21 DIAGNOSIS — J45.20 MILD INTERMITTENT ASTHMA WITHOUT COMPLICATION: ICD-10-CM

## 2024-09-23 RX ORDER — ALBUTEROL SULFATE 90 UG/1
2 INHALANT RESPIRATORY (INHALATION) EVERY 6 HOURS PRN
Qty: 8.5 EACH | Refills: 6 | Status: SHIPPED | OUTPATIENT
Start: 2024-09-23

## 2024-09-23 NOTE — TELEPHONE ENCOUNTER
Received request via: Pharmacy    Was the patient seen in the last year in this department? Yes    Does the patient have an active prescription (recently filled or refills available) for medication(s) requested? No    Pharmacy Name: [id number]    Does the patient have prison Plus and need 100-day supply? (This applies to ALL medications) Patient does not have SCP

## 2024-10-01 DIAGNOSIS — F90.9 ATTENTION DEFICIT HYPERACTIVITY DISORDER (ADHD), UNSPECIFIED ADHD TYPE: ICD-10-CM

## 2024-10-01 RX ORDER — DEXTROAMPHETAMINE SACCHARATE, AMPHETAMINE ASPARTATE, DEXTROAMPHETAMINE SULFATE AND AMPHETAMINE SULFATE 2.5; 2.5; 2.5; 2.5 MG/1; MG/1; MG/1; MG/1
10 TABLET ORAL DAILY
Qty: 60 TABLET | Refills: 0 | Status: SHIPPED | OUTPATIENT
Start: 2024-10-01 | End: 2024-11-30

## 2024-11-06 ENCOUNTER — OFFICE VISIT (OUTPATIENT)
Dept: MEDICAL GROUP | Facility: IMAGING CENTER | Age: 39
End: 2024-11-06
Payer: COMMERCIAL

## 2024-11-06 VITALS
BODY MASS INDEX: 21.67 KG/M2 | WEIGHT: 151.4 LBS | DIASTOLIC BLOOD PRESSURE: 72 MMHG | HEIGHT: 70 IN | SYSTOLIC BLOOD PRESSURE: 120 MMHG | TEMPERATURE: 99.4 F | RESPIRATION RATE: 16 BRPM | HEART RATE: 64 BPM | OXYGEN SATURATION: 93 %

## 2024-11-06 DIAGNOSIS — F90.9 ATTENTION DEFICIT HYPERACTIVITY DISORDER (ADHD), UNSPECIFIED ADHD TYPE: ICD-10-CM

## 2024-11-06 PROCEDURE — 99213 OFFICE O/P EST LOW 20 MIN: CPT

## 2024-11-06 PROCEDURE — 3074F SYST BP LT 130 MM HG: CPT

## 2024-11-06 PROCEDURE — 1126F AMNT PAIN NOTED NONE PRSNT: CPT

## 2024-11-06 PROCEDURE — 3078F DIAST BP <80 MM HG: CPT

## 2024-11-06 RX ORDER — DEXTROAMPHETAMINE SACCHARATE, AMPHETAMINE ASPARTATE MONOHYDRATE, DEXTROAMPHETAMINE SULFATE AND AMPHETAMINE SULFATE 7.5; 7.5; 7.5; 7.5 MG/1; MG/1; MG/1; MG/1
30 CAPSULE, EXTENDED RELEASE ORAL EVERY MORNING
Qty: 90 CAPSULE | Refills: 0 | Status: SHIPPED | OUTPATIENT
Start: 2024-11-06 | End: 2025-02-04

## 2024-11-06 RX ORDER — DEXTROAMPHETAMINE SACCHARATE, AMPHETAMINE ASPARTATE, DEXTROAMPHETAMINE SULFATE AND AMPHETAMINE SULFATE 2.5; 2.5; 2.5; 2.5 MG/1; MG/1; MG/1; MG/1
10 TABLET ORAL DAILY
Qty: 90 TABLET | Refills: 0 | Status: SHIPPED | OUTPATIENT
Start: 2024-11-06 | End: 2025-02-04

## 2024-11-06 ASSESSMENT — FIBROSIS 4 INDEX: FIB4 SCORE: 0.68

## 2024-11-06 ASSESSMENT — PAIN SCALES - GENERAL: PAINLEVEL_OUTOF10: NO PAIN

## 2024-11-06 NOTE — PROGRESS NOTES
Subjective:     CC:   Chief Complaint   Patient presents with    Medication Follow-up     HPI:   Juanita presents today to discuss:    Attention deficit hyperactivity disorder (ADHD), unspecified ADHD type  Chronic condition. Patient on Adderall 30 mg XR in am and Adderral 10 mg tab in afternoon.   She is tolerating medications well without side effects.  She is requesting med refill.  Patient denies symptoms of aggression, impulsivity, irritability, hyperactivity, difficulty with concentration.    Past Medical History:   Diagnosis Date    ADHD     Allergy     Anxiety     Depression     Hashimoto's disease     Hyperthyroidism     Restless leg syndrome      Family History   Problem Relation Age of Onset    Osteoporosis Mother     No Known Problems Father      Past Surgical History:   Procedure Laterality Date    MAMMOPLASTY AUGMENTATION  2011    [place] BREAST AUGMENTATION WITH IMPLANT  2011     Social History     Tobacco Use    Smoking status: Never    Smokeless tobacco: Never   Vaping Use    Vaping status: Never Used   Substance Use Topics    Alcohol use: Yes     Comment: occassionally    Drug use: Not Currently     Types: Marijuana, Oral     Comment:  a few times a month/occ     Social History     Social History Narrative    Not on file     Current Outpatient Medications Ordered in Epic   Medication Sig Dispense Refill    amphetamine-dextroamphetamine ER (ADDERALL XR) 30 MG XR capsule Take 1 Capsule by mouth every morning for 90 days. 90 Capsule 0    amphetamine-dextroamphetamine (ADDERALL) 10 MG Tab Take 1 Tablet by mouth every day for 90 days. 90 Tablet 0    fluconazole (DIFLUCAN) 150 MG tablet One tablet by mouth for 1 dose, may repeat in 3 days for persistent symptoms 2 Tablet 0    montelukast (SINGULAIR) 10 MG Tab TAKE ONE TABLET BY MOUTH DAILY 90 Tablet 2    liothyronine (CYTOMEL) 5 MCG Tab Take 5 mcg by mouth.      EPINEPHrine (EPIPEN) 0.3 MG/0.3ML Solution Auto-injector solution for injection Inject 0.3 mL into  the thigh one time for 1 dose. 1 Each 0    fluconazole (DIFLUCAN) 150 MG tablet Take 1 Tablet by mouth every day. 3 Tablet 3    SYNTHROID 150 MCG Tab TAKE ONE TABLET BY MOUTH EVERY MORNING ON AN EMPTY STOMACH (Patient taking differently: Take 150 mcg by mouth every morning on an empty stomach. 175 twice a week and then 150MCG the rest of the time) 60 Tablet 0    Multiple Vitamin (MULTI-VITAMIN PO) Take  by mouth.      Cetirizine HCl (ZYRTEC ALLERGY PO) Take  by mouth.      fluticasone (FLOVENT HFA) 220 MCG/ACT Aerosol Inhale 1 Puff 2 times a day. 12 g 1    Spacer/Aero-Hold Chamber Bags Misc Dispense 1 spacer for albuterol MDI. 1 Each 0    Alcohol Swabs Wipe site with prep pad prior to injection. 100 Each 0    MAGNESIUM PO Take  by mouth.      DIGESTIVE ENZYMES PO Take  by mouth.      Probiotic Product (PROBIOTIC PO) Take  by mouth.      albuterol 108 (90 Base) MCG/ACT Aero Soln inhalation aerosol INHALE 2 PUFFS BY MOUTH EVERY 6 HOURS AS NEEDED FOR SHORTNESS OF BREATH 8.5 Each 6    fluconazole (DIFLUCAN) 150 MG tablet       Blood Glucose Meter Kit Test blood sugar as recommended by provider when near-syncopal for hypoglycemia. One Touch Verio blood glucose monitoring kit. (Patient not taking: Reported on 11/6/2024) 1 Kit 0    Blood Glucose Test Strips Use one One Touch Verio Flex strip to test blood sugar once daily . (Patient not taking: Reported on 11/6/2024) 30 Each 1    Lancets Use one One Touch Verio Flex lancet to test blood sugar once daily . (Patient not taking: Reported on 11/6/2024) 100 Each 0     No current Epic-ordered facility-administered medications on file.     Eggs, Shellfish allergy, Wheat bran, and Tree nuts food allergy    ROS: see hpi  Gen: no fevers/chills  Pulm: no sob, no cough  CV: no chest pain, no palpitations, no edema  GI: no nausea/vomiting, no diarrhea  Skin: no rash    Objective:   Exam:  /72 (BP Location: Left arm, Patient Position: Sitting, BP Cuff Size: Adult)   Pulse 64    "Temp 37.4 °C (99.4 °F) (Temporal)   Resp 16   Ht 1.778 m (5' 10\")   Wt 68.7 kg (151 lb 6.4 oz)   LMP 10/10/2024   SpO2 93%   BMI 21.72 kg/m²    Body mass index is 21.72 kg/m².    Gen: Alert and oriented, No apparent distress.  HEENT: Head atraumatic, normocephalic. Pupils equal and round.  Neck: Neck is supple without lymphadenopathy.   Lungs: Normal effort, CTA bilaterally, no wheezes, rhonchi, or rales  CV: Regular rate and rhythm. No murmurs, rubs, or gallops.  ABD: +BS. Non-tender, non-distended. No rebound, rigidity, or guarding.  Ext: No clubbing, cyanosis, edema.    Assessment & Plan:     39 y.o. female with the following -     1. Attention deficit hyperactivity disorder (ADHD), unspecified ADHD type  Chronic and stable condition on Adderall XR 30 mg daily and Adderall 10 mg daily.  Patient compliant with drug therapy.  Urine drug screen up-to-date. Pt compliant with controlled since nicolás protocol.  90 day med refill sent to pharmacy.  Medication administration and side effects discussed.  Obtained and reviewed patient utilization report from [facility] pharmacy database on 11/6/2024 11:19 AM  prior to writing prescription for controlled substance II, III or IV per [place] bill . Based on assessment of the report, the prescription is medically necessary.     - amphetamine-dextroamphetamine ER (ADDERALL XR) 30 MG XR capsule; Take 1 Capsule by mouth every morning for 90 days.  Dispense: 90 Capsule; Refill: 0  - amphetamine-dextroamphetamine (ADDERALL) 10 MG Tab; Take 1 Tablet by mouth every day for 90 days.  Dispense: 90 Tablet; Refill: 0      Return in about 3 months (around 2/6/2025), or if symptoms worsen or fail to improve.    ADI Romero [facility]    Medical Decision Making/Course:  In the course of preparing for this visit with review of the pertinent past medical history, recent and past clinic visits, current medications, and performing chart, " immunization, medical history and medication reconciliation, and in the further course of obtaining the current history pertinent to the clinic visit today, performing an exam and evaluation, ordering and independently evaluating labs, tests, and/or procedures, prescribing any recommended new medications as noted above, providing any pertinent counseling and education and recommending further coordination of care. This was discussed with patient in a shared-decision making conversation, and they understand and agreed with plan of care.     Please note that this dictation was created using voice recognition software. I have made every reasonable attempt to correct obvious errors, but I expect that there are errors of grammar and possibly content that I did not discover before finalizing the note.

## 2024-11-13 ENCOUNTER — APPOINTMENT (OUTPATIENT)
Dept: MEDICAL GROUP | Facility: IMAGING CENTER | Age: 39
End: 2024-11-13
Payer: COMMERCIAL

## 2024-12-11 ENCOUNTER — APPOINTMENT (OUTPATIENT)
Dept: MEDICAL GROUP | Facility: IMAGING CENTER | Age: 39
End: 2024-12-11
Payer: COMMERCIAL

## 2024-12-20 RX ORDER — MONTELUKAST SODIUM 10 MG/1
10 TABLET ORAL DAILY
Qty: 30 TABLET | Refills: 0 | Status: SHIPPED | OUTPATIENT
Start: 2024-12-20

## 2024-12-20 NOTE — TELEPHONE ENCOUNTER
Received request via: Pharmacy    Was the patient seen in the last year in this department? Yes    Does the patient have an active prescription (recently filled or refills available) for medication(s) requested? No    [id number]    Does the patient have USP Plus and need 100-day supply? (This applies to ALL medications) Patient does not have SCP

## 2025-01-16 RX ORDER — MONTELUKAST SODIUM 10 MG/1
10 TABLET ORAL DAILY
Qty: 90 TABLET | Refills: 0 | Status: SHIPPED | OUTPATIENT
Start: 2025-01-16

## 2025-01-16 NOTE — TELEPHONE ENCOUNTER
Received request via: Pharmacy    Was the patient seen in the last year in this department? Yes    Does the patient have an active prescription (recently filled or refills available) for medication(s) requested? No    Pharmacy Name: [facility] [id number]    Does the patient have detention Plus and need 100-day supply? (This applies to ALL medications) Patient does not have SCP

## 2025-02-05 ENCOUNTER — HOSPITAL ENCOUNTER (OUTPATIENT)
Facility: MEDICAL CENTER | Age: 40
End: 2025-02-05
Payer: COMMERCIAL

## 2025-02-05 ENCOUNTER — OFFICE VISIT (OUTPATIENT)
Dept: MEDICAL GROUP | Facility: IMAGING CENTER | Age: 40
End: 2025-02-05
Payer: COMMERCIAL

## 2025-02-05 VITALS
RESPIRATION RATE: 16 BRPM | SYSTOLIC BLOOD PRESSURE: 110 MMHG | HEIGHT: 70 IN | WEIGHT: 157 LBS | BODY MASS INDEX: 22.48 KG/M2 | HEART RATE: 80 BPM | DIASTOLIC BLOOD PRESSURE: 80 MMHG | TEMPERATURE: 97.9 F | OXYGEN SATURATION: 96 %

## 2025-02-05 DIAGNOSIS — E03.9 ACQUIRED HYPOTHYROIDISM: ICD-10-CM

## 2025-02-05 DIAGNOSIS — Z79.899 ON STIMULANT MEDICATION: ICD-10-CM

## 2025-02-05 DIAGNOSIS — F90.9 ATTENTION DEFICIT HYPERACTIVITY DISORDER (ADHD), UNSPECIFIED ADHD TYPE: ICD-10-CM

## 2025-02-05 DIAGNOSIS — Z12.31 ENCOUNTER FOR SCREENING MAMMOGRAM FOR MALIGNANT NEOPLASM OF BREAST: ICD-10-CM

## 2025-02-05 DIAGNOSIS — Z11.59 NEED FOR HEPATITIS C SCREENING TEST: ICD-10-CM

## 2025-02-05 DIAGNOSIS — Z00.00 WELLNESS EXAMINATION: ICD-10-CM

## 2025-02-05 DIAGNOSIS — Z51.81 THERAPEUTIC DRUG MONITORING: ICD-10-CM

## 2025-02-05 PROCEDURE — 99214 OFFICE O/P EST MOD 30 MIN: CPT

## 2025-02-05 PROCEDURE — 80307 DRUG TEST PRSMV CHEM ANLYZR: CPT

## 2025-02-05 PROCEDURE — 3074F SYST BP LT 130 MM HG: CPT

## 2025-02-05 PROCEDURE — 1126F AMNT PAIN NOTED NONE PRSNT: CPT

## 2025-02-05 PROCEDURE — 3079F DIAST BP 80-89 MM HG: CPT

## 2025-02-05 PROCEDURE — G0480 DRUG TEST DEF 1-7 CLASSES: HCPCS

## 2025-02-05 RX ORDER — DEXTROAMPHETAMINE SACCHARATE, AMPHETAMINE ASPARTATE, DEXTROAMPHETAMINE SULFATE AND AMPHETAMINE SULFATE 2.5; 2.5; 2.5; 2.5 MG/1; MG/1; MG/1; MG/1
10 TABLET ORAL
Qty: 90 TABLET | Refills: 0 | Status: SHIPPED | OUTPATIENT
Start: 2025-02-05 | End: 2025-05-06

## 2025-02-05 RX ORDER — DEXTROAMPHETAMINE SACCHARATE, AMPHETAMINE ASPARTATE, DEXTROAMPHETAMINE SULFATE AND AMPHETAMINE SULFATE 2.5; 2.5; 2.5; 2.5 MG/1; MG/1; MG/1; MG/1
10 TABLET ORAL
COMMUNITY
Start: 2025-01-05 | End: 2025-02-05 | Stop reason: SDUPTHER

## 2025-02-05 RX ORDER — DEXTROAMPHETAMINE SACCHARATE, AMPHETAMINE ASPARTATE MONOHYDRATE, DEXTROAMPHETAMINE SULFATE AND AMPHETAMINE SULFATE 7.5; 7.5; 7.5; 7.5 MG/1; MG/1; MG/1; MG/1
30 CAPSULE, EXTENDED RELEASE ORAL EVERY MORNING
COMMUNITY
Start: 2024-11-29 | End: 2025-02-05 | Stop reason: SDUPTHER

## 2025-02-05 RX ORDER — DEXTROAMPHETAMINE SACCHARATE, AMPHETAMINE ASPARTATE MONOHYDRATE, DEXTROAMPHETAMINE SULFATE AND AMPHETAMINE SULFATE 7.5; 7.5; 7.5; 7.5 MG/1; MG/1; MG/1; MG/1
30 CAPSULE, EXTENDED RELEASE ORAL EVERY MORNING
Qty: 90 CAPSULE | Refills: 0 | Status: SHIPPED | OUTPATIENT
Start: 2025-02-05 | End: 2025-05-06

## 2025-02-05 ASSESSMENT — PATIENT HEALTH QUESTIONNAIRE - PHQ9: CLINICAL INTERPRETATION OF PHQ2 SCORE: 0

## 2025-02-05 ASSESSMENT — FIBROSIS 4 INDEX: FIB4 SCORE: 0.68

## 2025-02-05 ASSESSMENT — PAIN SCALES - GENERAL: PAINLEVEL_OUTOF10: NO PAIN

## 2025-02-05 NOTE — PROGRESS NOTES
Subjective:     CC:   Chief Complaint   Patient presents with    Medication Follow-up     HPI:   Juanita presents today to discuss:    Attention deficit hyperactivity disorder (ADHD), unspecified ADHD type  Chronic condition. Patient on Adderall 30 mg XR in am and Adderral 10 mg tab in afternoon.   She is tolerating medications well without side effects.  She is requesting med refill.  Patient denies symptoms of aggression, impulsivity, irritability, hyperactivity, difficulty with concentration.     Acquired hypothyroidism  Chronic and stable condition.  Patient on Synthroid and Cytomel daily which is managed by endocrinologist.  She reports recent TSH WNL.  She is asymptomatic.        Past Medical History:   Diagnosis Date    ADHD     Allergy     Anxiety     Depression     Hashimoto's disease     Hyperthyroidism     Restless leg syndrome      Family History   Problem Relation Age of Onset    Osteoporosis Mother     No Known Problems Father      Past Surgical History:   Procedure Laterality Date    MAMMOPLASTY AUGMENTATION  2011    SD BREAST AUGMENTATION WITH IMPLANT  2011     Social History     Tobacco Use    Smoking status: Never    Smokeless tobacco: Never   Vaping Use    Vaping status: Never Used   Substance Use Topics    Alcohol use: Yes     Comment: occassionally    Drug use: Yes     Types: Marijuana, Oral     Comment:  a few times a month/occ     Social History     Social History Narrative    Not on file     Current Outpatient Medications Ordered in Epic   Medication Sig Dispense Refill    amphetamine-dextroamphetamine ER (ADDERALL XR) 30 MG XR capsule Take 1 Capsule by mouth every morning for 90 days. 90 Capsule 0    amphetamine-dextroamphetamine (ADDERALL) 10 MG Tab Take 1 Tablet by mouth every day for 90 days. 90 Tablet 0    montelukast (SINGULAIR) 10 MG Tab Take 1 Tablet by mouth every day. 90 Tablet 0    albuterol 108 (90 Base) MCG/ACT Aero Soln inhalation aerosol INHALE 2 PUFFS BY MOUTH EVERY 6 HOURS AS  NEEDED FOR SHORTNESS OF BREATH 8.5 Each 6    liothyronine (CYTOMEL) 5 MCG Tab Take 5 mcg by mouth.      EPINEPHrine (EPIPEN) 0.3 MG/0.3ML Solution Auto-injector solution for injection Inject 0.3 mL into the thigh one time for 1 dose. 1 Each 0    SYNTHROID 150 MCG Tab TAKE ONE TABLET BY MOUTH EVERY MORNING ON AN EMPTY STOMACH (Patient taking differently: Take 150 mcg by mouth every morning on an empty stomach. 175 twice a week and then 150MCG the rest of the time) 60 Tablet 0    Multiple Vitamin (MULTI-VITAMIN PO) Take  by mouth.      Cetirizine HCl (ZYRTEC ALLERGY PO) Take  by mouth.      Spacer/Aero-Hold Chamber Bags Misc Dispense 1 spacer for albuterol MDI. 1 Each 0    MAGNESIUM PO Take  by mouth.      DIGESTIVE ENZYMES PO Take  by mouth.      Probiotic Product (PROBIOTIC PO) Take  by mouth.      fluconazole (DIFLUCAN) 150 MG tablet One tablet by mouth for 1 dose, may repeat in 3 days for persistent symptoms (Patient not taking: Reported on 2/5/2025) 2 Tablet 0    fluconazole (DIFLUCAN) 150 MG tablet Take 1 Tablet by mouth every day. (Patient not taking: Reported on 2/5/2025) 3 Tablet 3    fluconazole (DIFLUCAN) 150 MG tablet       fluticasone (FLOVENT HFA) 220 MCG/ACT Aerosol Inhale 1 Puff 2 times a day. (Patient not taking: Reported on 2/5/2025) 12 g 1    Blood Glucose Meter Kit Test blood sugar as recommended by provider when near-syncopal for hypoglycemia. One Touch Verio blood glucose monitoring kit. (Patient not taking: Reported on 2/5/2025) 1 Kit 0    Blood Glucose Test Strips Use one One Touch Verio Flex strip to test blood sugar once daily . (Patient not taking: Reported on 2/5/2025) 30 Each 1    Lancets Use one One Touch Verio Flex lancet to test blood sugar once daily . (Patient not taking: Reported on 8/20/2024) 100 Each 0    Alcohol Swabs Wipe site with prep pad prior to injection. (Patient not taking: Reported on 2/5/2025) 100 Each 0     No current Epic-ordered facility-administered medications on  "file.     Eggs, Shellfish allergy, Wheat bran, and Tree nuts food allergy    ROS: see hpi  Gen: no fevers/chills  Pulm: no sob, no cough  CV: no chest pain, no palpitations, no edema  GI: no nausea/vomiting, no diarrhea  Skin: no rash    Objective:   Exam:  /80 (BP Location: Left arm, Patient Position: Sitting, BP Cuff Size: Adult)   Pulse 80   Temp 36.6 °C (97.9 °F) (Temporal)   Resp 16   Ht 1.778 m (5' 10\")   Wt 71.2 kg (157 lb)   LMP 02/01/2025 (Exact Date)   SpO2 96%   BMI 22.53 kg/m²    Body mass index is 22.53 kg/m².    Gen: Alert and oriented, No apparent distress.  HEENT: Head atraumatic, normocephalic. Pupils equal and round.  Neck: Neck is supple without lymphadenopathy.   Lungs: Normal effort, CTA bilaterally, no wheezes, rhonchi, or rales  CV: Regular rate and rhythm. No murmurs, rubs, or gallops.  ABD: +BS. Non-tender, non-distended. No rebound, rigidity, or guarding.  Ext: No clubbing, cyanosis, edema.    Assessment & Plan:     39 y.o. female with the following -     1. Wellness examination  PMH/PSH/FH/Social history reviewed. Medication reconciled. Vaccinations discussed. Previous records and labs reviewed. Discussed age appropriate anticipatory guidance.  Will screen for anemia, thyroid disorder, metabolic disorder, cardiovascular disease, diabetes, and vitamin deficiency. Will order labs.    - CBC WITH DIFFERENTIAL; Future  - Comp Metabolic Panel; Future  - Lipid Profile; Future  - TSH WITH REFLEX TO FT4; Future  - VITAMIN D,25 HYDROXY (DEFICIENCY); Future  - HEMOGLOBIN A1C; Future  - HEP C VIRUS ANTIBODY; Future    2. Attention deficit hyperactivity disorder (ADHD), unspecified ADHD type  3. On stimulant medication  4. Therapeutic drug monitoring  Chronic and stable condition on Adderall XR 30 mg daily and Adderall 10 mg daily.  VSS. Patient compliant with drug therapy.  Urine drug screen up-to-date. Pt compliant with controlled since nicolás protocol.  90 day med refill sent to " pharmacy.  Medication administration and side effects discussed.  Obtained and reviewed patient utilization report from [facility] pharmacy database on 11/6/2024 11:19 AM  prior to writing prescription for controlled substance II, III or IV per [place] bill . Based on assessment of the report, the prescription is medically necessary.   Controlled substance discussed with client. Client agrees to abide by controlled substance contract.    - URINE DRUG SCREEN; Future  - Controlled Substance Treatment Agreement  - CBC WITH DIFFERENTIAL; Future  - Comp Metabolic Panel; Future  - TSH WITH REFLEX TO FT4; Future  - VITAMIN D,25 HYDROXY (DEFICIENCY); Future  - HEMOGLOBIN A1C; Future  - amphetamine-dextroamphetamine ER (ADDERALL XR) 30 MG XR capsule; Take 1 Capsule by mouth every morning for 90 days.  Dispense: 90 Capsule; Refill: 0  - amphetamine-dextroamphetamine (ADDERALL) 10 MG Tab; Take 1 Tablet by mouth every day for 90 days.  Dispense: 90 Tablet; Refill: 0      5. Encounter for screening mammogram for malignant neoplasm of breast    - MA-SCREENING MAMMO BILAT W/ IMPLANTS W/CAD; Future    6. Need for hepatitis C screening test    - HEP C VIRUS ANTIBODY; Future    7. Acquired hypothyroidism  Chronic and stable condition on current regimen.  Managed by endocrinology.  Recommend follow-up as scheduled.    - CBC WITH DIFFERENTIAL; Future  - Comp Metabolic Panel; Future  - TSH WITH REFLEX TO FT4; Future      Return in about 3 months (around 5/5/2025), or if symptoms worsen or fail to improve.    NEO Romero.   [facility]    Medical Decision Making/Course:  In the course of preparing for this visit with review of the pertinent past medical history, recent and past clinic visits, current medications, and performing chart, immunization, medical history and medication reconciliation, and in the further course of obtaining the current history pertinent to the clinic visit today, performing an  exam and evaluation, ordering and independently evaluating labs, tests, and/or procedures, prescribing any recommended new medications as noted above, providing any pertinent counseling and education and recommending further coordination of care. This was discussed with patient in a shared-decision making conversation, and they understand and agreed with plan of care.     Please note that this dictation was created using voice recognition software. I have made every reasonable attempt to correct obvious errors, but I expect that there are errors of grammar and possibly content that I did not discover before finalizing the note.

## 2025-02-07 LAB
AMPHET CTO UR CFM-MCNC: NORMAL NG/ML
BARBITURATES CTO UR CFM-MCNC: NEGATIVE NG/ML
BENZODIAZ CTO UR CFM-MCNC: NEGATIVE NG/ML
CANNABINOIDS CTO UR CFM-MCNC: NORMAL NG/ML
COCAINE CTO UR CFM-MCNC: NEGATIVE NG/ML
CREAT UR-MCNC: 96.2 MG/DL (ref 20–400)
DRUG COMMENT 753798: NORMAL
METHADONE CTO UR CFM-MCNC: NEGATIVE NG/ML
OPIATES CTO UR CFM-MCNC: NEGATIVE NG/ML
PCP CTO UR CFM-MCNC: NEGATIVE NG/ML
PROPOXYPH CTO UR CFM-MCNC: NEGATIVE NG/ML

## 2025-02-09 LAB
AMPHET UR CFM-MCNC: 3587 NG/ML
MDA UR CFM-MCNC: <200 NG/ML
MDEA UR CFM-MCNC: <200 NG/ML
MDMA UR CFM-MCNC: <200 NG/ML
METHAMPHET UR CFM-MCNC: <200 NG/ML
PHENTERMINE UR CFM-MCNC: <200 NG/ML

## 2025-02-10 RX ORDER — MONTELUKAST SODIUM 10 MG/1
10 TABLET ORAL DAILY
Qty: 90 TABLET | Refills: 0 | Status: SHIPPED | OUTPATIENT
Start: 2025-02-10

## 2025-02-10 NOTE — TELEPHONE ENCOUNTER
Received request via: Pharmacy    Was the patient seen in the last year in this department? Yes    Does the patient have an active prescription (recently filled or refills available) for medication(s) requested? No    Pharmacy Name: [facility] [id number]    Does the patient have [facility] Plus and need 100-day supply? (This applies to ALL medications) Patient does not have SCP

## 2025-02-12 LAB — THC UR CFM-MCNC: 42 NG/ML

## 2025-04-15 ENCOUNTER — OFFICE VISIT (OUTPATIENT)
Dept: MEDICAL GROUP | Facility: IMAGING CENTER | Age: 40
End: 2025-04-15
Payer: COMMERCIAL

## 2025-04-15 VITALS
HEART RATE: 85 BPM | WEIGHT: 155 LBS | HEIGHT: 70 IN | TEMPERATURE: 98 F | DIASTOLIC BLOOD PRESSURE: 78 MMHG | SYSTOLIC BLOOD PRESSURE: 118 MMHG | BODY MASS INDEX: 22.19 KG/M2 | OXYGEN SATURATION: 98 % | RESPIRATION RATE: 16 BRPM

## 2025-04-15 DIAGNOSIS — F90.9 ATTENTION DEFICIT HYPERACTIVITY DISORDER (ADHD), UNSPECIFIED ADHD TYPE: ICD-10-CM

## 2025-04-15 PROCEDURE — 99214 OFFICE O/P EST MOD 30 MIN: CPT

## 2025-04-15 PROCEDURE — 1126F AMNT PAIN NOTED NONE PRSNT: CPT

## 2025-04-15 PROCEDURE — 3078F DIAST BP <80 MM HG: CPT

## 2025-04-15 PROCEDURE — 3074F SYST BP LT 130 MM HG: CPT

## 2025-04-15 RX ORDER — DEXTROAMPHETAMINE SACCHARATE, AMPHETAMINE ASPARTATE, DEXTROAMPHETAMINE SULFATE AND AMPHETAMINE SULFATE 2.5; 2.5; 2.5; 2.5 MG/1; MG/1; MG/1; MG/1
10 TABLET ORAL
Qty: 90 TABLET | Refills: 0 | Status: SHIPPED | OUTPATIENT
Start: 2025-05-05 | End: 2025-08-03

## 2025-04-15 RX ORDER — DEXTROAMPHETAMINE SACCHARATE, AMPHETAMINE ASPARTATE MONOHYDRATE, DEXTROAMPHETAMINE SULFATE AND AMPHETAMINE SULFATE 7.5; 7.5; 7.5; 7.5 MG/1; MG/1; MG/1; MG/1
30 CAPSULE, EXTENDED RELEASE ORAL EVERY MORNING
Qty: 90 CAPSULE | Refills: 0 | Status: SHIPPED | OUTPATIENT
Start: 2025-05-05 | End: 2025-08-03

## 2025-04-15 ASSESSMENT — FIBROSIS 4 INDEX: FIB4 SCORE: 0.69

## 2025-04-15 ASSESSMENT — PAIN SCALES - GENERAL: PAINLEVEL_OUTOF10: NO PAIN

## 2025-04-15 NOTE — PROGRESS NOTES
Subjective:     CC:   Chief Complaint   Patient presents with    Medication Management     adhd       HPI:   Juanita presents today to discuss:    Attention deficit hyperactivity disorder (ADHD), unspecified ADHD type  Chronic condition. Patient on Adderall 30 mg XR in am and Adderral 10 mg tab in afternoon.   She is tolerating medications well without side effects.    90 day refills was sent for convenience since patient has been stable and compliant with drug therapy and controlled substance protocol for over a year.   She presents today for 3 month f/u.   She admits that she is one month early for f/u as she did not want to miss the 3 month f/u due to limited available schedule.  Patient denies symptoms of aggression, impulsivity, irritability, hyperactivity, difficulty with concentration.       Past Medical History:   Diagnosis Date    ADHD     Allergy     Anxiety     Depression     Hashimoto's disease     Hyperthyroidism     Restless leg syndrome      Family History   Problem Relation Age of Onset    Osteoporosis Mother     No Known Problems Father      Past Surgical History:   Procedure Laterality Date    MAMMOPLASTY AUGMENTATION  2011    PA BREAST AUGMENTATION WITH IMPLANT  2011     Social History     Tobacco Use    Smoking status: Never    Smokeless tobacco: Never   Vaping Use    Vaping status: Never Used   Substance Use Topics    Alcohol use: Yes     Alcohol/week: 0.6 oz     Types: 1 Shots of liquor per week     Comment: occassionally    Drug use: Yes     Types: Marijuana, Oral     Comment:  a few times a month/occ     Social History     Social History Narrative    Not on file     Current Outpatient Medications Ordered in Epic   Medication Sig Dispense Refill    [START ON 5/5/2025] amphetamine-dextroamphetamine (ADDERALL) 10 MG Tab Take 1 Tablet by mouth every day for 90 days. 90 Tablet 0    [START ON 5/5/2025] amphetamine-dextroamphetamine ER (ADDERALL XR) 30 MG XR capsule Take 1 Capsule by mouth every  "morning for 90 days. 90 Capsule 0    montelukast (SINGULAIR) 10 MG Tab Take 1 Tablet by mouth every day. 90 Tablet 0    albuterol 108 (90 Base) MCG/ACT Aero Soln inhalation aerosol INHALE 2 PUFFS BY MOUTH EVERY 6 HOURS AS NEEDED FOR SHORTNESS OF BREATH 8.5 Each 6    liothyronine (CYTOMEL) 5 MCG Tab Take 5 mcg by mouth.      EPINEPHrine (EPIPEN) 0.3 MG/0.3ML Solution Auto-injector solution for injection Inject 0.3 mL into the thigh one time for 1 dose. 1 Each 0    SYNTHROID 150 MCG Tab TAKE ONE TABLET BY MOUTH EVERY MORNING ON AN EMPTY STOMACH (Patient taking differently: Take 150 mcg by mouth every morning on an empty stomach. 175 twice a week and then 150MCG the rest of the time) 60 Tablet 0    Multiple Vitamin (MULTI-VITAMIN PO) Take  by mouth.      Cetirizine HCl (ZYRTEC ALLERGY PO) Take  by mouth.      Spacer/Aero-Hold Chamber Bags Misc Dispense 1 spacer for albuterol MDI. 1 Each 0    MAGNESIUM PO Take  by mouth.      DIGESTIVE ENZYMES PO Take  by mouth.      Probiotic Product (PROBIOTIC PO) Take  by mouth.       No current Epic-ordered facility-administered medications on file.     Eggs, Shellfish allergy, Wheat bran, Levothyroxine, and Tree nuts food allergy    ROS: see hpi  Gen: no fevers/chills  Pulm: no sob, no cough  CV: no chest pain, no palpitations, no edema  GI: no nausea/vomiting, no diarrhea  Skin: no rash    Objective:   Exam:  /78 (BP Location: Right arm, Patient Position: Sitting, BP Cuff Size: Adult)   Pulse 85   Temp 36.7 °C (98 °F) (Temporal)   Resp 16   Ht 1.778 m (5' 10\")   Wt 70.3 kg (155 lb)   LMP 03/26/2025 (Exact Date)   SpO2 98%   BMI 22.24 kg/m²    Body mass index is 22.24 kg/m².    Gen: Alert and oriented, No apparent distress.  HEENT: Head atraumatic, normocephalic. Pupils equal and round.  Neck: Neck is supple without lymphadenopathy.   Lungs: Normal effort, CTA bilaterally, no wheezes, rhonchi, or rales  CV: Regular rate and rhythm. No murmurs, rubs, or gallops.  ABD: " +BS. Non-tender, non-distended. No rebound, rigidity, or guarding.  Ext: No clubbing, cyanosis, edema.    Assessment & Plan:     40 y.o. female with the following -     1. Attention deficit hyperactivity disorder (ADHD), unspecified ADHD type  Chronic condition, stable for over a year w/o dose adjustments. Patient on Adderall 30 mg XR in am and Adderral 10 mg tab in afternoon.   She is tolerating medications well without side effects.    Patient has been compliant with drug therapy and controlled substance protocol. Will send 90 day refills for convenience   Obtained and reviewed patient utilization report from [facility] pharmacy database on 4/15/2025 12:15 PM  prior to writing prescription for controlled substance II, III or IV per [place] bill . Based on assessment of the report, the prescription is medically necessary.   Will f/u in 3 months for med surveillance. Ok to have virtual f/u.      Return in about 3 months (around 7/15/2025), or if symptoms worsen or fail to improve, for med check, ok if virtual.    ADI Romero [facility]    Medical Decision Making/Course:  In the course of preparing for this visit with review of the pertinent past medical history, recent and past clinic visits, current medications, and performing chart, immunization, medical history and medication reconciliation, and in the further course of obtaining the current history pertinent to the clinic visit today, performing an exam and evaluation, ordering and independently evaluating labs, tests, and/or procedures, prescribing any recommended new medications as noted above, providing any pertinent counseling and education and recommending further coordination of care. This was discussed with patient in a shared-decision making conversation, and they understand and agreed with plan of care.     Please note that this dictation was created using voice recognition software. I have made every reasonable  attempt to correct obvious errors, but I expect that there are errors of grammar and possibly content that I did not discover before finalizing the note.

## 2025-07-23 RX ORDER — MONTELUKAST SODIUM 10 MG/1
10 TABLET ORAL DAILY
Qty: 90 TABLET | Refills: 0 | Status: SHIPPED | OUTPATIENT
Start: 2025-07-23

## 2025-07-23 NOTE — TELEPHONE ENCOUNTER
Was the patient seen in the last year in this department? Yes    Does the patient have an active prescription (recently filled or refills available) for medication(s) requested? No    [facility]    Does the patient have assisted Plus and need 100-day supply? (This applies to ALL medications) Patient does not have SCP

## 2025-07-29 ENCOUNTER — OFFICE VISIT (OUTPATIENT)
Dept: MEDICAL GROUP | Facility: IMAGING CENTER | Age: 40
End: 2025-07-29
Payer: COMMERCIAL

## 2025-07-29 VITALS
BODY MASS INDEX: 22.28 KG/M2 | WEIGHT: 155.6 LBS | DIASTOLIC BLOOD PRESSURE: 68 MMHG | TEMPERATURE: 98.9 F | OXYGEN SATURATION: 97 % | SYSTOLIC BLOOD PRESSURE: 122 MMHG | HEART RATE: 78 BPM | HEIGHT: 70 IN | RESPIRATION RATE: 16 BRPM

## 2025-07-29 DIAGNOSIS — F90.9 ATTENTION DEFICIT HYPERACTIVITY DISORDER (ADHD), UNSPECIFIED ADHD TYPE: Primary | ICD-10-CM

## 2025-07-29 DIAGNOSIS — J45.20 MILD INTERMITTENT ASTHMA WITHOUT COMPLICATION: ICD-10-CM

## 2025-07-29 RX ORDER — DEXTROAMPHETAMINE SACCHARATE, AMPHETAMINE ASPARTATE, DEXTROAMPHETAMINE SULFATE AND AMPHETAMINE SULFATE 2.5; 2.5; 2.5; 2.5 MG/1; MG/1; MG/1; MG/1
10 TABLET ORAL
Qty: 90 TABLET | Refills: 0 | Status: SHIPPED | OUTPATIENT
Start: 2025-07-29 | End: 2025-10-27

## 2025-07-29 RX ORDER — ESTRADIOL 0.05 MG/D
PATCH, EXTENDED RELEASE TRANSDERMAL
COMMUNITY
Start: 2025-07-21

## 2025-07-29 RX ORDER — DEXTROAMPHETAMINE SACCHARATE, AMPHETAMINE ASPARTATE MONOHYDRATE, DEXTROAMPHETAMINE SULFATE AND AMPHETAMINE SULFATE 7.5; 7.5; 7.5; 7.5 MG/1; MG/1; MG/1; MG/1
30 CAPSULE, EXTENDED RELEASE ORAL EVERY MORNING
Qty: 90 CAPSULE | Refills: 0 | Status: SHIPPED | OUTPATIENT
Start: 2025-07-29 | End: 2025-10-27

## 2025-07-29 RX ORDER — PROGESTERONE 100 MG/1
CAPSULE ORAL
COMMUNITY
Start: 2025-07-21

## 2025-07-29 RX ORDER — LEVOTHYROXINE SODIUM 175 UG/1
175 TABLET ORAL
COMMUNITY

## 2025-07-29 RX ORDER — ALBUTEROL SULFATE 90 UG/1
2 INHALANT RESPIRATORY (INHALATION) EVERY 6 HOURS PRN
Qty: 8.5 EACH | Refills: 6 | Status: SHIPPED | OUTPATIENT
Start: 2025-07-29

## 2025-07-29 ASSESSMENT — FIBROSIS 4 INDEX: FIB4 SCORE: 0.69

## 2025-07-29 ASSESSMENT — PAIN SCALES - GENERAL: PAINLEVEL_OUTOF10: NO PAIN

## 2025-07-29 NOTE — PROGRESS NOTES
"Subjective:     CC:   Chief Complaint   Patient presents with    Medication Refill     ADHD Medication and Inhaler       HPI:   Juanita presents today to discuss:    Attention deficit hyperactivity disorder (ADHD), unspecified ADHD type  Chronic condition. Patient on Adderall 30 mg XR in am and Adderral 10 mg tab in afternoon.   She is tolerating medications well without side effects.    90 day refills was sent for convenience since patient has been stable and compliant with drug therapy and controlled substance protocol for over a year.   She presents today for 3 month f/u.   Patient denies symptoms of aggression, impulsivity, irritability, hyperactivity, difficulty with concentration.    Mild intermittent without complication  Chronic condition. Patient is taking albuterol inhaler as needed. Pt admits having exercise induced asthma and uses inhaler before cardio work out.   No recent flare ups.  Pt is requesting refills.     Past Medical History[1]  Family History   Problem Relation Age of Onset    Osteoporosis Mother     No Known Problems Father      Past Surgical History[2]  Social History[3]  Social History     Social History Narrative    Not on file     Current Medications and Prescriptions Ordered in Epic[4]  Eggs, Shellfish allergy, Wheat bran, Levothyroxine, and Tree nuts food allergy    ROS: see hpi  Gen: no fevers/chills  Pulm: no sob, no cough  CV: no chest pain, no palpitations, no edema  GI: no nausea/vomiting, no diarrhea  Skin: no rash    Objective:   Exam:  /68 (BP Location: Right arm, Patient Position: Sitting, BP Cuff Size: Adult)   Pulse 78   Temp 37.2 °C (98.9 °F) (Temporal)   Resp 16   Ht 1.778 m (5' 10\")   Wt 70.6 kg (155 lb 9.6 oz)   LMP 07/13/2025 (Exact Date)   SpO2 97%   BMI 22.33 kg/m²    Body mass index is 22.33 kg/m².    Gen: Alert and oriented, No apparent distress.  HEENT: Head atraumatic, normocephalic. Pupils equal and round.  Neck: Neck is supple without " lymphadenopathy.   Lungs: Normal effort, CTA bilaterally, no wheezes, rhonchi, or rales  CV: Regular rate and rhythm. No murmurs, rubs, or gallops.  ABD: +BS. Non-tender, non-distended. No rebound, rigidity, or guarding.  Ext: No clubbing, cyanosis, edema.    Assessment & Plan:     40 y.o. female with the following -     1. Attention deficit hyperactivity disorder (ADHD), unspecified ADHD type (Primary)  Chronic condition, stable for over a year w/o dose adjustments. Patient on Adderall 30 mg XR in am and Adderral 10 mg tab in afternoon.   She is tolerating medications well without side effects.    Patient has been compliant with drug therapy and controlled substance protocol. Will send 90 day refills for convenience   Obtained and reviewed patient utilization report from [facility] pharmacy database on 4/15/2025 12:15 PM  prior to writing prescription for controlled substance II, III or IV per [place] bill . Based on assessment of the report, the prescription is medically necessary.   Will f/u in 3 months for med surveillance.   Ok to have virtual f/u.    - amphetamine-dextroamphetamine (ADDERALL) 10 MG Tab; Take 1 Tablet by mouth every day for 90 days.  Dispense: 90 Tablet; Refill: 0  - amphetamine-dextroamphetamine ER (ADDERALL XR) 30 MG XR capsule; Take 1 Capsule by mouth every morning for 90 days.  Dispense: 90 Capsule; Refill: 0    2. Mild intermittent asthma without complication  Chronic and stable condition.  Patient without recurrent symptoms.  Requesting refill on albuterol inhaler for as needed use only.  Discussed side effects such as increased heart rate.  Encourage nonpharmacological interventions such as avoiding allergens.  Discussed signs and symptoms of respiratory distress and need to present to ED.    - albuterol 108 (90 Base) MCG/ACT Aero Soln inhalation aerosol; Inhale 2 Puffs every 6 hours as needed for Shortness of Breath.  Dispense: 8.5 Each; Refill: 6      Return in about 3 months  (around 10/29/2025), or if symptoms worsen or fail to improve, for med check.    ADI Romero   [facility]    Medical Decision Making/Course:  In the course of preparing for this visit with review of the pertinent past medical history, recent and past clinic visits, current medications, and performing chart, immunization, medical history and medication reconciliation, and in the further course of obtaining the current history pertinent to the clinic visit today, performing an exam and evaluation, ordering and independently evaluating labs, tests, and/or procedures, prescribing any recommended new medications as noted above, providing any pertinent counseling and education and recommending further coordination of care. This was discussed with patient in a shared-decision making conversation, and they understand and agreed with plan of care.     Please note that this dictation was created using voice recognition software. I have made every reasonable attempt to correct obvious errors, but I expect that there are errors of grammar and possibly content that I did not discover before finalizing the note.         [1]   Past Medical History:  Diagnosis Date    ADHD     Allergy     Anxiety     Depression     Hashimoto's disease     Hyperthyroidism     Restless leg syndrome    [2]   Past Surgical History:  Procedure Laterality Date    MAMMOPLASTY AUGMENTATION  2011    NE BREAST AUGMENTATION WITH IMPLANT  2011   [3]   Social History  Tobacco Use    Smoking status: Never    Smokeless tobacco: Never   Vaping Use    Vaping status: Never Used   Substance Use Topics    Alcohol use: Yes     Alcohol/week: 3.0 oz     Types: 5 Shots of liquor per week     Comment: occassionally    Drug use: Yes     Types: Marijuana, Oral     Comment:  a few times a month/occ   [4]   Current Outpatient Medications Ordered in Epic   Medication Sig Dispense Refill    levothyroxine (SYNTHROID) 175 MCG Tab Take 175 mcg by mouth  two times a week. (Patient taking differently: Take 175 mcg by mouth every Monday, Wednesday, and Friday.)      LYLLANA 0.05 MG/24HR PATCH BIWEEKLY APPLY 1 PATCH TO SKIN 2 X WEEKLY (MON/THURS)      progesterone (PROMETRIUM) 100 MG Cap TAKE 1 BY MOUTH EVERY DAY AT BEDTIME      albuterol 108 (90 Base) MCG/ACT Aero Soln inhalation aerosol Inhale 2 Puffs every 6 hours as needed for Shortness of Breath. 8.5 Each 6    amphetamine-dextroamphetamine (ADDERALL) 10 MG Tab Take 1 Tablet by mouth every day for 90 days. 90 Tablet 0    amphetamine-dextroamphetamine ER (ADDERALL XR) 30 MG XR capsule Take 1 Capsule by mouth every morning for 90 days. 90 Capsule 0    montelukast (SINGULAIR) 10 MG Tab TAKE 1 TABLET BY MOUTH EVERY DAY 90 Tablet 0    liothyronine (CYTOMEL) 5 MCG Tab Take 5 mcg by mouth.      EPINEPHrine (EPIPEN) 0.3 MG/0.3ML Solution Auto-injector solution for injection Inject 0.3 mL into the thigh one time for 1 dose. 1 Each 0    SYNTHROID 150 MCG Tab TAKE ONE TABLET BY MOUTH EVERY MORNING ON AN EMPTY STOMACH (Patient taking differently: Take 150 mcg by mouth every morning on an empty stomach. 175 twice a week and then 150MCG the rest of the time) 60 Tablet 0    Multiple Vitamin (MULTI-VITAMIN PO) Take  by mouth.      Cetirizine HCl (ZYRTEC ALLERGY PO) Take  by mouth.      MAGNESIUM PO Take  by mouth.      DIGESTIVE ENZYMES PO Take  by mouth.      Probiotic Product (PROBIOTIC PO) Take  by mouth.      Spacer/Aero-Hold Chamber Bags Misc Dispense 1 spacer for albuterol MDI. (Patient not taking: Reported on 7/29/2025) 1 Each 0     No current [facility]-ordered facility-administered medications on file.